# Patient Record
Sex: FEMALE | Race: WHITE | NOT HISPANIC OR LATINO | Employment: OTHER | URBAN - NONMETROPOLITAN AREA
[De-identification: names, ages, dates, MRNs, and addresses within clinical notes are randomized per-mention and may not be internally consistent; named-entity substitution may affect disease eponyms.]

---

## 2016-06-14 LAB — HBA1C MFR BLD: 6.3 % (ref 4–6)

## 2017-01-07 DIAGNOSIS — F34.1 DYSTHYMIA: Primary | ICD-10-CM

## 2017-01-10 RX ORDER — VENLAFAXINE HYDROCHLORIDE 75 MG/1
CAPSULE, EXTENDED RELEASE ORAL
Qty: 180 CAPSULE | Refills: 0 | Status: SHIPPED | OUTPATIENT
Start: 2017-01-10 | End: 2017-04-06

## 2017-01-30 ENCOUNTER — HOSPITAL ENCOUNTER (EMERGENCY)
Facility: HOSPITAL | Age: 57
Discharge: HOME | End: 2017-01-30
Attending: NURSE PRACTITIONER | Admitting: NURSE PRACTITIONER
Payer: COMMERCIAL

## 2017-01-30 VITALS
HEART RATE: 66 BPM | TEMPERATURE: 98.1 F | SYSTOLIC BLOOD PRESSURE: 142 MMHG | RESPIRATION RATE: 18 BRPM | OXYGEN SATURATION: 99 % | DIASTOLIC BLOOD PRESSURE: 77 MMHG

## 2017-01-30 DIAGNOSIS — J04.0 LARYNGITIS: ICD-10-CM

## 2017-01-30 DIAGNOSIS — R09.82 POST-NASAL DRIP: ICD-10-CM

## 2017-01-30 PROCEDURE — 99212 OFFICE O/P EST SF 10 MIN: CPT

## 2017-01-30 PROCEDURE — 99212 OFFICE O/P EST SF 10 MIN: CPT | Performed by: NURSE PRACTITIONER

## 2017-01-30 ASSESSMENT — ENCOUNTER SYMPTOMS
ARTHRALGIAS: 0
SINUS PRESSURE: 1
VOICE CHANGE: 1
VOMITING: 0
CHILLS: 0
NAUSEA: 0
COUGH: 1
FEVER: 0
SHORTNESS OF BREATH: 0
APPETITE CHANGE: 0
MYALGIAS: 0
FATIGUE: 0

## 2017-01-30 NOTE — LETTER
[facility]  [address]  [place] [address]  [phone]    2017    Suzy Gallardo  [address] [facility] [address]  [phone] (home)     : 1960      To Whom it may concern:    Suzy Gallardo was seen in our Urgent Care Department today, 2017.  I expect her condition to improve over the next 1-2 days.  She may return to work/school when improved.    Sincerely,        Luzma Escobar

## 2017-01-30 NOTE — ED NOTES
Pt presents with laryngitis x 1 week. States she was seen Thursday in UC in [facility] and was prescribed amoxicillin, ibuprofen and Claritin. Pt states she is not getting any better.

## 2017-01-30 NOTE — DISCHARGE INSTRUCTIONS
Mucinex   Netti pot    Laryngitis    Laryngitis is a swelling of the tissues around the vocal cords. Symptoms include a hoarse (scratchy) voice. The voice may be lost completely. It may be caused by a viral illness, such as a head or chest cold. It may also be due to overuse and strain of the voice. Smoking, drinking alcohol, acid reflux, allergies, or inhaling harsh chemicals may also lead to symptoms. This condition will usually resolve in 1-2 weeks.  Home care    Rest your voice until it recovers. Talk as little as possible. If your symptoms are severe, rest at home for a day or so.    Breathing cool steam from a humidifier/vaporizer or in a steamy shower may be helpful.    Drink plenty of fluids to stay well hydrated.    Do not smoke  Follow-up care  Follow up with your healthcare provider or this facility if you have not improved after one week.  When to seek medical advice  Contact your healthcare provider for any of the following:    Severe pain with swallowing    Trouble opening mouth    Neck swelling, neck pain, or trouble moving neck    Noisy breathing or trouble breathing    Fever of 100.4 F (38. C) or higher, or as directed by your healthcare provider    Drooling    Symptoms do not resolve in 2 weeks    9924-0450 [employer]). [address] [place] [address], [place] [address]. All rights reserved. This information is not intended as a substitute for professional medical care. Always follow your healthcare professional's instructions.

## 2017-01-30 NOTE — ED PROVIDER NOTES
History     Chief Complaint   Patient presents with     Laryngitis     one week hx of laryngitis     The history is provided by the patient. No  was used.     Suzy Gallardo is a 56 year old female who presents today with a CC of 1 week of laryngitis.  She was seen in  in [facility] on 1/26/17 for the same symptoms and started on Amoxicillin, claritin, and ibuprofen.  She has been doing voice rest, hot tea, warm salt water gargles,  but has continued laryngitis.  She does have a cough and post nasal drip.  Reports she feels like there is thick secretions coating the back of her throat.   She is a  and is unable to teach due to laryngitis.      I have reviewed the Medications, Allergies, Past Medical and Surgical History, and Social History in the Epic system.    Review of Systems   Constitutional: Negative for fever, chills, appetite change and fatigue.   HENT: Positive for ear pain (intermittent pressure), sinus pressure and voice change.    Respiratory: Positive for cough. Negative for shortness of breath.    Gastrointestinal: Negative for nausea and vomiting.   Musculoskeletal: Negative for myalgias and arthralgias.       Physical Exam   BP: 142/77 mmHg  Pulse: 66  Temp: 98.1  F (36.7  C)  Resp: 18  SpO2: 99 %    Physical Exam   Constitutional: She is oriented to person, place, and time. She appears well-developed and well-nourished.   HENT:   Head: Normocephalic and atraumatic.   Right Ear: Tympanic membrane, external ear and ear canal normal.   Left Ear: Tympanic membrane, external ear and ear canal normal.   Nose: Mucosal edema present. Right sinus exhibits no maxillary sinus tenderness and no frontal sinus tenderness. Left sinus exhibits no maxillary sinus tenderness and no frontal sinus tenderness.   Mouth/Throat: Uvula is midline and oropharynx is clear and moist.   Neck: Normal range of motion. Neck supple.   Cardiovascular: Normal rate and regular rhythm.     Pulmonary/Chest: Effort normal and breath sounds normal.   Neurological: She is alert and oriented to person, place, and time.   Psychiatric: She has a normal mood and affect. Her behavior is normal.   Nursing note and vitals reviewed.      ED Course   Procedures    Assessments & Plan (with Medical Decision Making)     I have reviewed the nursing notes.    I have reviewed the findings, diagnosis, plan and need for follow up with the patient.  ASSESSMENT / PLAN:  (J04.0) Laryngitis  Comment:  Plan:  Mucinex to thin secretions   Netti pot   Flonase    Continue warm salt water gargles   Continue voice rest   Follow up with Dr Mena on Wednesday   Patient verbally educated and given appropriate education sheets for their diagnoses and has no questions.   Return to ED/UC if symptoms increase or concerns develop: red flag symptoms as discussed and per discharge instructions     (R09.82) Post-nasal drip  Comment:   Plan:  As above        Discharge Medication List as of 1/30/2017 11:28 AM          Final diagnoses:   Laryngitis   Post-nasal drip       1/30/2017   [facility]      Luzma Escobar NP  01/30/17 [id number]

## 2017-01-30 NOTE — LETTER
[facility]  [address]  [place] [address]  [phone]    2017    Suzy Gallardo  [address] [facility] [address]  [phone] (home)     : 1960      To Whom it may concern:    Suzy Gallardo was seen in our Urgent Care Department today, 2017.  I expect her condition to improve over the next 1-3 days.  She may return to work/school when improved.    Sincerely,        Luzma Escobar

## 2017-01-30 NOTE — ED AVS SNAPSHOT
[facility]    [address] [address]    Phone:  [phone]                                       Suzy Gallardo   MRN: [id number]    Department:  [facility]   Date of Visit:  1/30/2017           Patient Information     Date Of Birth          1960        Your diagnoses for this visit were:     Sinusitis, unspecified chronicity, unspecified location     Laryngitis        You were seen by Luzma Escobar NP.      Follow-up Information     Follow up with [facility].    Specialty:  EMERGENCY MEDICINE    Why:  As needed, If symptoms worsen, or concerns develop    Contact information:    [address] [address] [phone]    Additional information:    From [facility]: Take [address]. Turn left at [address]. Turn left at the first stoplight on [address]. At the first stop sign, take a right onto [address]. Take a left into the parking lot and continue through until you reach the North enterance of the building.       From [place]: Take [address]. Take the [address] ramp towards [place]. Turn left onto [address]. Take a slight right onto [address] [facility]. Turn left at the first stoplight on [address]. At the first stop sign, take a right onto [address]. Take a left into the parking lot and continue through until you reach the North enterance of the building.       From [place]: Take [address]. Take a right at [address]. At the first stop sign, take a right onto [address]. Take a left into the parking lot and continue through until you reach the North enterance of the building.         Follow up with Nathanael Mena MD.    Specialty:  Family Practice    Contact information:    [facility]  [address]  [facility] [address]  [phone]          Discharge Instructions         Mucinex   Netti  pot    Laryngitis    Laryngitis is a swelling of the tissues around the vocal cords. Symptoms include a hoarse (scratchy) voice. The voice may be lost completely. It may be caused by a viral illness, such as a head or chest cold. It may also be due to overuse and strain of the voice. Smoking, drinking alcohol, acid reflux, allergies, or inhaling harsh chemicals may also lead to symptoms. This condition will usually resolve in 1-2 weeks.  Home care    Rest your voice until it recovers. Talk as little as possible. If your symptoms are severe, rest at home for a day or so.    Breathing cool steam from a humidifier/vaporizer or in a steamy shower may be helpful.    Drink plenty of fluids to stay well hydrated.    Do not smoke  Follow-up care  Follow up with your healthcare provider or this facility if you have not improved after one week.  When to seek medical advice  Contact your healthcare provider for any of the following:    Severe pain with swallowing    Trouble opening mouth    Neck swelling, neck pain, or trouble moving neck    Noisy breathing or trouble breathing    Fever of 100.4 F (38. C) or higher, or as directed by your healthcare provider    Drooling    Symptoms do not resolve in 2 weeks    5437-6675 [employer]. [address] [place] [address]. All rights reserved. This information is not intended as a substitute for professional medical care. Always follow your healthcare professional's instructions.          Future Appointments        Provider Department Dept Phone Center    3/7/2017 8:30 AM Lali Monroe MD, MD [facility] [phone] [facility]    3/7/2017 10:00 AM  M[facility] [place] [phone] [place]         Review of your medicines      Our records show that you are taking the medicines listed below. If these are incorrect, please call your family doctor or clinic.        Dose / Directions Last dose taken     "amoxicillin-clavulanate 875-125 MG per tablet   Commonly known as:  AUGMENTIN   Dose:  1 tablet   Quantity:  20 tablet        Take 1 tablet by mouth 2 times daily   Refills:  0        ASPIRIN PO   Dose:  81 mg        Take 81 mg by mouth daily   Refills:  0        * BD U/F III SHORT PEN NEEDLE        See instructions.   Refills:  0        * pen needles 5/16\" 31G X 8 MM Misc        Refills:  0        calcium-vitamin D 600-400 MG-UNIT per tablet   Commonly known as:  CALTRATE   Dose:  1 tablet        Take 1 tablet by mouth daily.   Refills:  0        CLARITIN PO        Refills:  0        furosemide 40 MG tablet   Commonly known as:  LASIX   Dose:  40 mg   Quantity:  180 tablet        Take 1 tablet (40 mg) by mouth 2 times daily   Refills:  2        GLUCOPHAGE PO   Dose:  1000 mg        Take 1,000 mg by mouth 2 times daily (with meals).   Refills:  0        IBUPROFEN PO        Refills:  0        LANTUS SOLOSTAR 100 UNIT/ML injection   Dose:  30 Units   Generic drug:  insulin glargine        Inject 30 Units Subcutaneous At Bedtime   Refills:  0        liraglutide 18 MG/3ML Soln   Commonly known as:  VICTOZA   Dose:  1.8 mg        Inject 1.8 mg Subcutaneous daily   Refills:  0        multivitamin, therapeutic with minerals Tabs tablet   Dose:  1 tablet        Take 1 tablet by mouth daily.   Refills:  0        ONE TOUCH ULTRA        1 each strip TID AC   Refills:  0        SIMVASTATIN PO   Dose:  40 mg        Take 40 mg by mouth daily.   Refills:  0        venlafaxine 75 MG 24 hr capsule   Commonly known as:  EFFEXOR-XR   Quantity:  180 capsule        TAKE TWO CAPSULES BY MOUTH DAILY   Refills:  0        * Notice:  This list has 2 medication(s) that are the same as other medications prescribed for you. Read the directions carefully, and ask your doctor or other care provider to review them with you.            Orders Needing Specimen Collection     None      Pending Results     No orders found from 1/29/2017 to 1/31/2017. "            Pending Culture Results     No orders found from 1/29/2017 to 1/31/2017.            Thank you for choosing [facility]       Thank you for choosing [facility] for your care. Our goal is always to provide you with excellent care. Hearing back from our patients is one way we can continue to improve our services. Please take a few minutes to complete the written survey that you may receive in the mail after you visit with us. Thank you!        [facility] Information     [facility] gives you secure access to your electronic health record. If you see a primary care provider, you can also send messages to your care team and make appointments. If you have questions, please call your primary care clinic.  If you do not have a primary care provider, please call [phone] and they will assist you.        Care EveryWhere ID     This is your Care EveryWhere ID. This could be used by other organizations to access your [facility] medical records  [id number]        After Visit Summary       This is your record. Keep this with you and show to your community pharmacist(s) and doctor(s) at your next visit.

## 2017-01-30 NOTE — ED AVS SNAPSHOT
[facility]    [address] [address]    Phone:  [phone]                                       Suzy Gallardo   MRN: [id number]    Department:  [facility]   Date of Visit:  1/30/2017           After Visit Summary Signature Page     I have received my discharge instructions, and my questions have been answered. I have discussed any challenges I see with this plan with the nurse or doctor.    ..........................................................................................................................................  Patient/Patient Representative Signature      ..........................................................................................................................................  Patient Representative Print Name and Relationship to Patient    ..................................................               ................................................  Date                                            Time    ..........................................................................................................................................  Reviewed by Signature/Title    ...................................................              ..............................................  Date                                                            Time

## 2017-02-01 ENCOUNTER — OFFICE VISIT (OUTPATIENT)
Dept: FAMILY MEDICINE | Facility: OTHER | Age: 57
End: 2017-02-01
Attending: FAMILY MEDICINE
Payer: COMMERCIAL

## 2017-02-01 VITALS
HEIGHT: 62 IN | DIASTOLIC BLOOD PRESSURE: 76 MMHG | OXYGEN SATURATION: 99 % | SYSTOLIC BLOOD PRESSURE: 110 MMHG | WEIGHT: 173 LBS | RESPIRATION RATE: 15 BRPM | TEMPERATURE: 97.9 F | BODY MASS INDEX: 31.83 KG/M2 | HEART RATE: 66 BPM

## 2017-02-01 DIAGNOSIS — J04.0 LARYNGITIS: Primary | ICD-10-CM

## 2017-02-01 PROCEDURE — 99213 OFFICE O/P EST LOW 20 MIN: CPT | Performed by: FAMILY MEDICINE

## 2017-02-01 RX ORDER — TRIAMCINOLONE ACETONIDE 55 UG/1
2 SPRAY, METERED NASAL DAILY
Status: ON HOLD | COMMUNITY
End: 2017-08-18

## 2017-02-01 NOTE — NURSING NOTE
"Chief Complaint   Patient presents with     Hospital F/U      1/30/2017       Initial /76 mmHg  Pulse 66  Temp(Src) 97.9  F (36.6  C) (Tympanic)  Resp 15  Ht 5' 2\" (1.575 m)  Wt 173 lb (78.472 kg)  BMI 31.63 kg/m2  SpO2 99% Estimated body mass index is 31.63 kg/(m^2) as calculated from the following:    Height as of this encounter: 5' 2\" (1.575 m).    Weight as of this encounter: 173 lb (78.472 kg).  BP completed using cuff size: xavier Deutsch      "

## 2017-02-01 NOTE — PROGRESS NOTES
Suzy Gallardo    February 1, 2017    Chief Complaint   Patient presents with     Hospital F/U      1/30/2017       SUBJECTIVE:  Here for ongoing laryngitis.   Feels fine except the voice.  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  Finished amox.  No other issues or concerns.  Gets dm cares with Dr. Craft.      Past Medical History   Diagnosis Date     Diabetes mellitus (H)      Diabetes mellitus without mention of complication, 11/30/2001     Pure hypercholesterolemia 11/26/1999     Cristian Bradley MD      Pure hyperglyceridemia 01/11/2000     Cristian Bradley MD      Anemia, unspecified 06/23/2004     Lali Monroe MD      Depressive disorder, not elsewhere classified 01/19/2004     Vaginitis and vulvovaginitis, unspecified 11/21/2001     Endometriosis of uterus 12/19/2001     Unspecified symptom associated with female genital organs 10/30/2001     Metrorrhagia 07/08/2004     Cellulitis and abscess of hand, except fingers and thumb 01/11/2002     Cellulitis and abscess of unspecified site 02/22/2002     Jaime Rodarte MD      Ingrowing nail 03/21/2000     Other (abnormal) findings on radiological examination of breast 08/15/2002     Alfonso Patton MD      Need for prophylactic vaccination and inoculation against influenza 11/06/2003     Sterilization 12/04/2001     Follow-up examination, following unspecified surgery 10/02/2001     Routine general medical examination at a health care facility 04/07/2003     Other screening mammogram 07/25/2000     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     Nathanael Mena MD        Past Surgical History   Procedure Laterality Date     Hysterectomy       [place] teeth removed       Breast surgery       breast biopsy     Laparoscopy       Colonoscopy  5/3/2013     Procedure: COLONOSCOPY;  COLONOSCOPY;  Surgeon: Pablito Urbina MD;  Location: [facility]     Nose surgery       Endometrial sampling (biopsy)         Current Outpatient Prescriptions   Medication Sig  "Dispense Refill     triamcinolone (NASACORT ALLERGY 24HR) 55 MCG/ACT Inhaler Spray 2 sprays into both nostrils daily       dextromethorphan-guaiFENesin (MUCINEX DM)  MG per 12 hr tablet Take 1 tablet by mouth every 12 hours       Loratadine (CLARITIN PO)        IBUPROFEN PO        venlafaxine (EFFEXOR-XR) 75 MG 24 hr capsule TAKE TWO CAPSULES BY MOUTH DAILY 180 capsule 0     furosemide (LASIX) 40 MG tablet Take 1 tablet (40 mg) by mouth 2 times daily 180 tablet 2     amoxicillin-clavulanate (AUGMENTIN) 875-125 MG per tablet Take 1 tablet by mouth 2 times daily 20 tablet 0     ASPIRIN PO Take 81 mg by mouth daily        multivitamin, therapeutic with minerals (THERA-VIT-M) TABS Take 1 tablet by mouth daily.       calcium-vitamin D (CALTRATE) 600-400 MG-UNIT per tablet Take 1 tablet by mouth daily.       Blood Glucose Monitoring Suppl (ONE TOUCH ULTRA) 1 each strip TID AC       Insulin Pen Needle (BD U/F III SHORT PEN NEEDLE) See instructions.       MetFORMIN HCl (GLUCOPHAGE PO) Take 1,000 mg by mouth 2 times daily (with meals).       insulin glargine (LANTUS SOLOSTAR) 100 UNIT/ML injection Inject 30 Units Subcutaneous At Bedtime        Insulin Pen Needle (PEN NEEDLES 5/16\") 31G X 8 MM MISC        liraglutide (VICTOZA) 18 MG/3ML SOLN Inject 1.8 mg Subcutaneous daily        SIMVASTATIN PO Take 40 mg by mouth daily.         No Known Allergies    Family History   Problem Relation Age of Onset     C.A.D. 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     OBJECTIVE:  B/P: 110/76, T: 97.9, P: 66, R: 15    GENERAL APPEARANCE: Alert, no acute distress  HEENT:  Normal.    CV: regular rate and rhythm, no murmur, rub or gallop  RESP: lungs clear to auscultation bilaterally  ABDOMEN: normal bowel sounds, soft, nontender, no hepatosplenomegaly or other masses  SKIN: no suspicious lesions or rashes to visualized skin  NEURO: Alert, oriented x 3, speech and mentation normal    ASSESSMENT and PLAN:  (J04.0) Laryngitis  (primary encounter diagnosis)  Comment: reviewed.    Plan: note for work, which is really what she needed.  Advised icy foods, voice rest, and f/u with ongoing concerns.

## 2017-02-10 ENCOUNTER — TRANSFERRED RECORDS (OUTPATIENT)
Dept: HEALTH INFORMATION MANAGEMENT | Facility: HOSPITAL | Age: 57
End: 2017-02-10

## 2017-02-10 LAB — HBA1C MFR BLD: 7.9 % (ref 4–6)

## 2017-03-07 ENCOUNTER — OFFICE VISIT (OUTPATIENT)
Dept: OBGYN | Facility: OTHER | Age: 57
End: 2017-03-07
Attending: OBSTETRICS & GYNECOLOGY
Payer: COMMERCIAL

## 2017-03-07 VITALS
BODY MASS INDEX: 34.78 KG/M2 | HEIGHT: 62 IN | SYSTOLIC BLOOD PRESSURE: 120 MMHG | HEART RATE: 64 BPM | WEIGHT: 189 LBS | DIASTOLIC BLOOD PRESSURE: 72 MMHG

## 2017-03-07 DIAGNOSIS — Z12.31 ENCOUNTER FOR SCREENING MAMMOGRAM FOR BREAST CANCER: ICD-10-CM

## 2017-03-07 DIAGNOSIS — Z12.31 VISIT FOR SCREENING MAMMOGRAM: Primary | ICD-10-CM

## 2017-03-07 DIAGNOSIS — Z00.00 ROUTINE GENERAL MEDICAL EXAMINATION AT A HEALTH CARE FACILITY: Primary | ICD-10-CM

## 2017-03-07 DIAGNOSIS — R92.8 ABNORMAL MAMMOGRAM: Primary | ICD-10-CM

## 2017-03-07 PROCEDURE — 77063 BREAST TOMOSYNTHESIS BI: CPT | Mod: TC | Performed by: RADIOLOGY

## 2017-03-07 PROCEDURE — G0202 SCR MAMMO BI INCL CAD: HCPCS | Mod: TC | Performed by: RADIOLOGY

## 2017-03-07 PROCEDURE — 99396 PREV VISIT EST AGE 40-64: CPT | Performed by: OBSTETRICS & GYNECOLOGY

## 2017-03-07 ASSESSMENT — ANXIETY QUESTIONNAIRES
2. NOT BEING ABLE TO STOP OR CONTROL WORRYING: NOT AT ALL
3. WORRYING TOO MUCH ABOUT DIFFERENT THINGS: NOT AT ALL
IF YOU CHECKED OFF ANY PROBLEMS ON THIS QUESTIONNAIRE, HOW DIFFICULT HAVE THESE PROBLEMS MADE IT FOR YOU TO DO YOUR WORK, TAKE CARE OF THINGS AT HOME, OR GET ALONG WITH OTHER PEOPLE: NOT DIFFICULT AT ALL
6. BECOMING EASILY ANNOYED OR IRRITABLE: SEVERAL DAYS
1. FEELING NERVOUS, ANXIOUS, OR ON EDGE: NOT AT ALL
5. BEING SO RESTLESS THAT IT IS HARD TO SIT STILL: NOT AT ALL
7. FEELING AFRAID AS IF SOMETHING AWFUL MIGHT HAPPEN: NOT AT ALL
GAD7 TOTAL SCORE: 2

## 2017-03-07 ASSESSMENT — PATIENT HEALTH QUESTIONNAIRE - PHQ9: 5. POOR APPETITE OR OVEREATING: SEVERAL DAYS

## 2017-03-07 NOTE — MR AVS SNAPSHOT
After Visit Summary   3/7/2017    Suzy Gallardo    MRN: [id number]           Patient Information     Date Of Birth          1960        Visit Information        Provider Department      3/7/2017 8:30 AM Lali Monroe MD [facility]        Today's Diagnoses     Routine general medical examination at a health care facility    -  1    Encounter for screening mammogram for breast cancer          Care Instructions    Mammogram to be scheduled. You will be contacted by hospital diagnostic imaging to make this appointment. Please call the nurse at [phone] if you have not been contacted in a timely manner to schedule.    The hemoccult cards are a screening test for blood in the stool. Disregard the instructions in your hemoccult envelope about altering your diet or medications for the test. Take your normal medications and eat your normal diet. This way we may know if you are having a problem with your diet or medications. To collect your sample, simply wipe your toilet paper on the 2 squares under the flap after you have had a bowel movement. Wait at least one day for your next bowel movement to do the next card. There are 3 cards total. Return all 3 cards labeled to come to Dr. Monroe in the clinic when they are all completed. You may mail them or drop them off at the . Make sure that your name and date of birth are on the cards. Call the nurse at [phone] if you have any questions.    Return for annual exam in 1 year    Colonoscopy due in 2020            Follow-ups after your visit        Your next 10 appointments already scheduled     Mar 07, 2017 10:00 AM CST   MAMMOGRAM with [facility] MAMMOGRAM [facility]    [address] [facility] [address]   [phone]           Do not wear any body powder, lotions, deodorant or perfume the day of the exam. Bring a list of all medications, especially hormones.  If your last mammogram was not  "done at [facility] [place], please bring your mammogram films. We will need the name of your MD/PA to send a copy of your report.              Who to contact     If you have questions or need follow up information about today's clinic visit or your schedule please contact [facility] directly at [phone].  Normal or non-critical lab and imaging results will be communicated to you by MyChart, letter or phone within 4 business days after the clinic has received the results. If you do not hear from us within 7 days, please contact the clinic through [employer] or phone. If you have a critical or abnormal lab result, we will notify you by phone as soon as possible.  Submit refill requests through [employer]use or call your pharmacy and they will forward the refill request to us. Please allow 3 business days for your refill to be completed.          Additional Information About Your Visit        MyChart Information     [employer] gives you secure access to your electronic health record. If you see a primary care provider, you can also send messages to your care team and make appointments. If you have questions, please call your primary care clinic.  If you do not have a primary care provider, please call [phone] and they will assist you.        Care EveryWhere ID     This is your Care EveryWhere ID. This could be used by other organizations to access your [facility] medical records  [id number]        Your Vitals Were     Pulse Height BMI (Body Mass Index)             64 5' 2\" (1.575 m) 34.57 kg/m2          Blood Pressure from Last 3 Encounters:   03/07/17 120/72   02/01/17 110/76   01/30/17 142/77    Weight from Last 3 Encounters:   03/07/17 189 lb (85.7 kg)   02/01/17 173 lb (78.5 kg)   08/03/16 173 lb 12.8 oz (78.8 kg)              We Performed the Following     MA Screening Digital Bilateral        Primary Care Provider Office Phone # Fax #    Nathanael Mena [phone]       [facility] " "[address]  [facility] [address]        Thank you!     [facility] you for choosing [facility]  for your care. Our goal is always to provide you with excellent care. Hearing back from our patients is one way we can continue to improve our services. Please take a few minutes to complete the written survey that you may receive in the mail after your visit with us. Thank you!             Your Updated Medication List - Protect others around you: Learn how to safely use, store and throw away your medicines at www.disposemymeds.org.          This list is accurate as of: 3/7/17  9:23 AM.  Always use your most recent med list.                   Brand Name Dispense Instructions for use    ASPIRIN PO      Take 81 mg by mouth daily       * BD U/F III SHORT PEN NEEDLE      See instructions.       * pen needles 5/16\" 31G X 8 MM Misc          calcium-vitamin D 600-400 MG-UNIT per tablet    CALTRATE     Take 1 tablet by mouth daily.       CLARITIN PO          dextromethorphan-guaiFENesin  MG per 12 hr tablet    MUCINEX DM     Take 1 tablet by mouth every 12 hours       furosemide 40 MG tablet    LASIX    180 tablet    Take 1 tablet (40 mg) by mouth 2 times daily       GLUCOPHAGE PO      Take 1,000 mg by mouth 2 times daily (with meals).       IBUPROFEN PO          LANTUS SOLOSTAR 100 UNIT/ML injection   Generic drug:  insulin glargine      Inject 30 Units Subcutaneous At Bedtime       liraglutide 18 MG/3ML Soln    VICTOZA     Inject 1.8 mg Subcutaneous daily       multivitamin, therapeutic with minerals Tabs tablet      Take 1 tablet by mouth daily.       NASACORT ALLERGY 24HR 55 MCG/ACT Inhaler   Generic drug:  triamcinolone      Spray 2 sprays into both nostrils daily       ONE TOUCH ULTRA      1 each strip TID AC       SIMVASTATIN PO      Take 40 mg by mouth daily.       venlafaxine 75 MG 24 hr capsule    EFFEXOR-XR    180 capsule    TAKE TWO CAPSULES BY MOUTH DAILY       * Notice:  This list has 2 medication(s) " that are the same as other medications prescribed for you. Read the directions carefully, and ask your doctor or other care provider to review them with you.

## 2017-03-07 NOTE — NURSING NOTE
"Chief Complaint   Patient presents with     Gyn Exam       Initial /72  Pulse 64  Ht 5' 2\" (1.575 m)  Wt 189 lb (85.7 kg)  BMI 34.57 kg/m2 Estimated body mass index is 34.57 kg/(m^2) as calculated from the following:    Height as of this encounter: 5' 2\" (1.575 m).    Weight as of this encounter: 189 lb (85.7 kg).  Medication Reconciliation: austen Jang      "

## 2017-03-07 NOTE — PATIENT INSTRUCTIONS
Mammogram to be scheduled. You will be contacted by [facility] diagnostic imaging to make this appointment. Please call the nurse at [phone] if you have not been contacted in a timely manner to schedule.    The hemoccult cards are a screening test for blood in the stool. Disregard the instructions in your hemoccult envelope about altering your diet or medications for the test. Take your normal medications and eat your normal diet. This way we may know if you are having a problem with your diet or medications. To collect your sample, simply wipe your toilet paper on the 2 squares under the flap after you have had a bowel movement. Wait at least one day for your next bowel movement to do the next card. There are 3 cards total. Return all 3 cards labeled to come to Dr. Monroe in the clinic when they are all completed. You may mail them or drop them off at the . Make sure that your name and date of birth are on the cards. Call the nurse at [phone] if you have any questions.    Return for annual exam in 1 year    Colonoscopy due in 2020

## 2017-03-07 NOTE — PROGRESS NOTES
ANNUAL    Suzy is a 56 year old   who presents for annual exam.   Postmenopausal since .  She is having hot flashes. No vaginal bleeding noted.     Concerns other than routine annual and health maintenance:  n.  GYNECOLOGIC HISTORY:    She is sexually active  Problems with sex: n  Safe: y   Wanting std testing? n  Estrogen replacement therapy:  n  History of abnormal Pap smear: n  Family history of breast ca n, ovarian ca n, uterine n, colon CA:  n       HEALTH MAINTENANCE:  Cholesterol: (No components found for: CHOL2 ) History of abnormal lipids: BT   Last Mammo: needs . History of abnormal Mammo: y   Regular Self Breast Exams: y  Last Colonoscopy:  History of abnormal Colonoscopy n due in   Calcium or vitamins; n   Exercise: n     TSH: (No components found for: TSH1 )  Pap; (No results found for: PAP )    HISTORY:  Obstetric History       T2      TAB0   SAB1   E0   M0   L2       # Outcome Date GA Lbr Apolinar/2nd Weight Sex Delivery Anes PTL Lv   3 SAB  6w0d    SAB      2 Term     F Vag-Spont   Y   1 Term     M Vag-Spont   Y        Past Medical History   Diagnosis Date     Anemia, unspecified 2004     Lali Monroe MD      Cellulitis and abscess of hand, except fingers and thumb 2002     Cellulitis and abscess of unspecified site 2002     Jaime Rodarte MD      Depressive disorder, not elsewhere classified 2004     Diabetes mellitus (H)      Diabetes mellitus without mention of complication, 2001     Endometriosis of uterus 2001     Follow-up examination, following unspecified surgery 10/02/2001     Ingrowing nail 2000     Metrorrhagia 2004     [place] disease  2012     Nathanael Mena MD      Need for prophylactic vaccination and inoculation against influenza 2003     Other (abnormal) findings on radiological examination of breast 08/15/2002     Alfonso Patton MD      Other screening mammogram 2000     Pure  hypercholesterolemia 11/26/1999     Cristian Bradley MD      Pure hyperglyceridemia 01/11/2000     Cristian Bradley MD      Routine general medical examination at a health care facility 04/07/2003     Sterilization 12/04/2001     Unspecified symptom associated with female genital organs 10/30/2001     Vaginitis and vulvovaginitis, unspecified 11/21/2001     Past Surgical History   Procedure Laterality Date     Hysterectomy       [place] teeth removed       Breast surgery       breast biopsy     Laparoscopy       Colonoscopy  5/3/2013     Procedure: COLONOSCOPY;  COLONOSCOPY;  Surgeon: Pablito Urbina MD;  Location: [facility]     Nose surgery       Endometrial sampling (biopsy)       Family History   Problem Relation Age of Onset     C.A.D. 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[place]     Social History Main Topics     Smoking status: Never Smoker     Smokeless tobacco: Never Used     Alcohol use No     Drug use: No     Sexual activity: Not Asked     Other Topics Concern     Blood Transfusions Yes     Caffeine Concern No     Social History Narrative       Current Outpatient Prescriptions:      triamcinolone (NASACORT ALLERGY 24HR) 55 MCG/ACT Inhaler, Spray 2 sprays into both nostrils daily, Disp: , Rfl:      dextromethorphan-guaiFENesin (MUCINEX DM)  MG per 12 hr tablet, Take 1 tablet by mouth every 12 hours, Disp: , Rfl:      Loratadine (CLARITIN PO), , Disp: , Rfl:      IBUPROFEN PO, , Disp: , Rfl:      venlafaxine (EFFEXOR-XR) 75 MG 24 hr capsule, TAKE TWO CAPSULES BY MOUTH DAILY, Disp: 180 capsule, Rfl: 0     furosemide (LASIX) 40 MG tablet, Take 1 tablet (40 mg) by mouth 2 times daily, Disp: 180 tablet, Rfl: 2     ASPIRIN PO, Take 81 mg by mouth daily , Disp: , Rfl:       "multivitamin, therapeutic with minerals (THERA-VIT-M) TABS, Take 1 tablet by mouth daily., Disp: , Rfl:      calcium-vitamin D (CALTRATE) 600-400 MG-UNIT per tablet, Take 1 tablet by mouth daily., Disp: , Rfl:      Blood Glucose Monitoring Suppl (ONE TOUCH ULTRA), 1 each strip TID AC, Disp: , Rfl:      Insulin Pen Needle (BD U/F III SHORT PEN NEEDLE), See instructions., Disp: , Rfl:      MetFORMIN HCl (GLUCOPHAGE PO), Take 1,000 mg by mouth 2 times daily (with meals)., Disp: , Rfl:      insulin glargine (LANTUS SOLOSTAR) 100 UNIT/ML injection, Inject 30 Units Subcutaneous At Bedtime , Disp: , Rfl:      Insulin Pen Needle (PEN NEEDLES 5/16\") 31G X 8 MM MISC, , Disp: , Rfl:      liraglutide (VICTOZA) 18 MG/3ML SOLN, Inject 1.8 mg Subcutaneous daily , Disp: , Rfl:      SIMVASTATIN PO, Take 40 mg by mouth daily., Disp: , Rfl:    No Known Allergies    Past medical, surgical, social and family history were reviewed and updated in EPIC.     ROS:   C:       NEGATIVE for fever, chills, change in weight   I:         NEGATIVE for worrisome rashes, moles or lesions  E:       NEGATIVE for vision changes or irritation  E/M:   NEGATIVE for ear, mouth and throat problems  R:       NEGATIVE for significant cough or SOB  CV:     NEGATIVE for chest pain, palpitations or peripheral edema  GI:      NEGATIVE for nausea, abdominal pain, heartburn, or change in bowel habits  :    NEGATIVE for frequency, dysuria, hematuria, vaginal discharge, or bleeding, incontinence   M:       NEGATIVE for significant arthralgias or myalgia  N:       NEGATIVE for weakness, dizziness or paresthesias  E:       NEGATIVE for temperature intolerance, skin/hair changes  P:       NEGATIVE for changes in mood or affect     EXAM:  /72  Pulse 64  Ht 5' 2\" (1.575 m)  Wt 189 lb (85.7 kg)  BMI 34.57 kg/m2   BMI: Body mass index is 34.57 kg/(m^2).  Constitutional: healthy, alert and no distress  Head: Normocephalic. No masses, lesions, tenderness or " abnormalities  Neck: Neck supple. Trachea midline. No adenopathy. Thyroid symmetric, normal size.   Cardiovascular: RRR.   Respiratory: lungs clear  Breast: Breasts reveal mild symmetric fibrocystic densities, but there are no dominant, discrete, fixed or suspicious masses found.   Gastrointestinal: Abdomen soft, non-tender, non-distended. No masses, organomegaly  Pelvic:  Vulva:  No external lesions, normal female hair distribution, no inguinal adenopathy.    Urethra:  Midline, non-tender, well supported, no discharge  Vagina:  Atrophic, no abnormal discharge, no lesions  Uterus: absent  Cervix: absent  Ovaries:  No masses appreciated, non-tender, mobile  Rectal Exam: neg for heme or mass    Musculoskeletal: extremities normal  Skin: no suspicious lesions or rashes  Psychiatric: Affect appropriate, cooperative,mentation appears normal.     COUNSELING:     regular exercise y  healthy diet/nutrition y  Immunizations: ?  Folic Acid Counseling  Osteoporosis Prevention/Bone Health   reports that she has never smoked. She has never used smokeless tobacco.  Tobacco Cessation Action Plan: na  Body mass index is 34.57 kg/(m^2).  Weight management plan: Current exercise routine: na. Diet regimen was discussed and plan is lo gy.     FRAX Risk Assessment  ASSESSMENT:  56 year old  with satisfactory annual exam    PLAN  Hemoccults,  Mammogram, Check MIIC  Colonoscopy due in   rto 1 yr      Greater than  0 minutes were spent on issues other than annual          Lali Monroe MD

## 2017-03-07 NOTE — LETTER
[facility] [place]  [address] [facility] [address]  [phone]      May 22, 2017      Suzy Gallardo  [address] [facility] [address]        Dear Suzy,      Thank you for coming to the [facility]. This letter is to inform you that your test results from your recent appointment were normal. Your results are listed below. Please call the nurse at [phone] if you have questions pertaining to these results.      Hemoccult (screen for blood in stool): 3 cards negative for blood in stool        Sincerely,      Lali Monroe MD/Daisy MOTTA LPN

## 2017-03-08 ASSESSMENT — ANXIETY QUESTIONNAIRES: GAD7 TOTAL SCORE: 2

## 2017-03-08 ASSESSMENT — PATIENT HEALTH QUESTIONNAIRE - PHQ9: SUM OF ALL RESPONSES TO PHQ QUESTIONS 1-9: 5

## 2017-03-13 ENCOUNTER — HOSPITAL ENCOUNTER (OUTPATIENT)
Dept: ULTRASOUND IMAGING | Facility: HOSPITAL | Age: 57
Discharge: HOME | End: 2017-03-13
Attending: FAMILY MEDICINE | Admitting: FAMILY MEDICINE
Payer: COMMERCIAL

## 2017-03-13 DIAGNOSIS — R92.8 ABNORMAL ULTRASOUND OF BREAST: ICD-10-CM

## 2017-03-13 DIAGNOSIS — Z12.31 ENCOUNTER FOR SCREENING MAMMOGRAM FOR MALIGNANT NEOPLASM OF BREAST: Primary | ICD-10-CM

## 2017-03-13 PROCEDURE — 76642 ULTRASOUND BREAST LIMITED: CPT | Mod: TC,RT

## 2017-03-20 ENCOUNTER — TELEPHONE (OUTPATIENT)
Dept: OBGYN | Facility: OTHER | Age: 57
End: 2017-03-20

## 2017-03-20 NOTE — TELEPHONE ENCOUNTER
This patient was referral to Dr. Alexander for breast biopsy for abnormal breast ultrasound last week and has not been contacted with an appointment time. She would like an appointment as soon as possible.  Please call Dr. Alexander's office to schedule. She will take the soonest available appointment any day or time. Prefers a Monday if possible, but will make whatever time they have work.    Patients phone number is [phone]

## 2017-04-03 ENCOUNTER — TRANSFERRED RECORDS (OUTPATIENT)
Dept: HEALTH INFORMATION MANAGEMENT | Facility: HOSPITAL | Age: 57
End: 2017-04-03

## 2017-04-06 DIAGNOSIS — F34.1 DYSTHYMIA: ICD-10-CM

## 2017-04-06 RX ORDER — VENLAFAXINE HYDROCHLORIDE 75 MG/1
CAPSULE, EXTENDED RELEASE ORAL
Qty: 180 CAPSULE | Refills: 0 | Status: SHIPPED | OUTPATIENT
Start: 2017-04-06 | End: 2017-09-16

## 2017-04-06 NOTE — TELEPHONE ENCOUNTER
effexor       Last Written Prescription Date: 1/10/17  Last Fill Quantity: 180; # refills: 0  Last Office Visit with [facility], [facility] or  Health prescribing provider:  2/1/17        Last PHQ-9 score on record=   PHQ-9 SCORE 3/7/2017   Total Score -   Total Score 5       Lab Results   Component Value Date    AST 33 07/24/2016     Lab Results   Component Value Date    ALT 29 07/24/2016

## 2017-04-16 ENCOUNTER — APPOINTMENT (OUTPATIENT)
Dept: LAB | Facility: OTHER | Age: 57
End: 2017-04-16
Attending: OBSTETRICS & GYNECOLOGY
Payer: COMMERCIAL

## 2017-04-29 ENCOUNTER — MYC MEDICAL ADVICE (OUTPATIENT)
Dept: OBGYN | Facility: OTHER | Age: 57
End: 2017-04-29

## 2017-05-01 ENCOUNTER — TELEPHONE (OUTPATIENT)
Dept: OBGYN | Facility: OTHER | Age: 57
End: 2017-05-01

## 2017-05-01 NOTE — TELEPHONE ENCOUNTER
"Patient sent the following question via my-chart:    Recently I had my annual mammogram and that was followed up with an ultrasound of my rt. breast due to something that was seen in the mammogram.  The ultrasound confirmed what was seen and the radiologist recommended a biopsy.  I then went to [place] to see Dr. Alexander and he recommended a needle biopsy.  When I went for that, they did another ultrasound and that radiologist said he didn't see anything to biopsy and to follow up in a year.    I am concerned about this since three other medical professionals \"saw something\".  My question is, do I need another opinion?  If not, should I wait an entire year before repeating a mammogram and/or ultrasound?  "

## 2017-05-22 LAB — Lab: NORMAL

## 2017-05-30 ENCOUNTER — TELEPHONE (OUTPATIENT)
Dept: FAMILY MEDICINE | Facility: OTHER | Age: 57
End: 2017-05-30

## 2017-05-30 DIAGNOSIS — Q82.0 HEREDITARY EDEMA OF LEGS: ICD-10-CM

## 2017-05-30 NOTE — TELEPHONE ENCOUNTER
11:15 AM    Reason for Call: Phone Call    Description: CELLULITIS IS COMING ON AND IT GETS OUT OF CONTROL FAST, PATIENT WOULD LIKE TO SPEAK WITH DR. DOYLE    Was an appointment offered for this call? N/A    Preferred method for responding to this message: Telephone Call    If we cannot reach you directly, may we leave a detailed response at the number you provided? Yes    Can this message wait until your PCP/provider returns, if available today? Not applicable, PROVIDER IS IN TODAY    Carla Nobles

## 2017-05-30 NOTE — TELEPHONE ENCOUNTER
Sent augmentin.  See me tomorrow please but start on the augmentin tonight.  I chose that one as it was the last one we were on.  Thanks. Nathanael Mena

## 2017-05-30 NOTE — TELEPHONE ENCOUNTER
I called the pt and notified, both medications are bid dosing. Should she take these together or space them out?

## 2017-05-30 NOTE — TELEPHONE ENCOUNTER
Pt is out of [facility] in [facility]. She has Amoxil and Sulfa with her that you gave to keep on hand for this. Should she start one or both?

## 2017-06-08 ENCOUNTER — MYC REFILL (OUTPATIENT)
Dept: FAMILY MEDICINE | Facility: OTHER | Age: 57
End: 2017-06-08

## 2017-06-08 DIAGNOSIS — Q82.0 HEREDITARY EDEMA OF LEGS: ICD-10-CM

## 2017-06-09 NOTE — TELEPHONE ENCOUNTER
Message from [employer]:  Original authorizing provider: Nathanael Mena MD    Suzy Gossgabrielgeorge would like a refill of the following medications:  amoxicillin-clavulanate (AUGMENTIN) 875-125 MG per tablet [Nathanael Mena MD]    Preferred pharmacy: [place] [id number] [place] [address]    Comment:  Hi. I'd also like to have the prescription refilled for Sulfamethoxazole-TMP, 1 tablet two times daily. Dr. Mena prescribed them for me to keep on hand for cellulitis flare ups that I am prone to getting. I recently used these two prescriptions while I was on vacation and started getting cellulitis in my right leg. This was the first time in a long time that I didn't end up in the hospital from the cellulitis because I was able to take the antibiotics immediately. Thank you.

## 2017-06-12 RX ORDER — SULFAMETHOXAZOLE/TRIMETHOPRIM 800-160 MG
1 TABLET ORAL 2 TIMES DAILY
Qty: 20 TABLET | Refills: 0 | Status: ON HOLD | OUTPATIENT
Start: 2017-06-12 | End: 2017-08-20

## 2017-08-18 ENCOUNTER — HOSPITAL ENCOUNTER (INPATIENT)
Facility: HOSPITAL | Age: 57
LOS: 2 days | Discharge: HOME | End: 2017-08-20
Attending: FAMILY MEDICINE | Admitting: HOSPITALIST
Payer: COMMERCIAL

## 2017-08-18 ENCOUNTER — TRANSFERRED RECORDS (OUTPATIENT)
Dept: HEALTH INFORMATION MANAGEMENT | Facility: HOSPITAL | Age: 57
End: 2017-08-18

## 2017-08-18 DIAGNOSIS — L03.90 CELLULITIS: ICD-10-CM

## 2017-08-18 DIAGNOSIS — Q82.0 HEREDITARY EDEMA OF LEGS: ICD-10-CM

## 2017-08-18 DIAGNOSIS — L03.116 CELLULITIS OF LEFT LOWER EXTREMITY: Primary | ICD-10-CM

## 2017-08-18 LAB
ALBUMIN SERPL-MCNC: 3 G/DL (ref 3.4–5)
ALP SERPL-CCNC: 108 U/L (ref 40–150)
ALT SERPL W P-5'-P-CCNC: 27 U/L (ref 0–50)
ANION GAP SERPL CALCULATED.3IONS-SCNC: 8 MMOL/L (ref 3–14)
AST SERPL W P-5'-P-CCNC: 20 U/L (ref 0–45)
BACTERIA SPEC CULT: NORMAL
BACTERIA SPEC CULT: NORMAL
BASOPHILS # BLD AUTO: 0.1 10E9/L (ref 0–0.2)
BASOPHILS NFR BLD AUTO: 0.6 %
BILIRUB SERPL-MCNC: 0.5 MG/DL (ref 0.2–1.3)
BUN SERPL-MCNC: 10 MG/DL (ref 7–30)
CALCIUM SERPL-MCNC: 8.7 MG/DL (ref 8.5–10.1)
CHLORIDE SERPL-SCNC: 100 MMOL/L (ref 94–109)
CHOLEST SERPL-MCNC: 147 MG/DL
CO2 SERPL-SCNC: 25 MMOL/L (ref 20–32)
CREAT SERPL-MCNC: 0.76 MG/DL (ref 0.52–1.04)
CREAT SERPL-MCNC: 0.82 MG/DL (ref 0.7–1.2)
CRP SERPL-MCNC: 324 MG/L (ref 0–8)
DIFFERENTIAL METHOD BLD: ABNORMAL
EOSINOPHIL # BLD AUTO: 0.1 10E9/L (ref 0–0.7)
EOSINOPHIL NFR BLD AUTO: 0.5 %
ERYTHROCYTE [DISTWIDTH] IN BLOOD BY AUTOMATED COUNT: 14 % (ref 10–15)
GFR SERPL CREATININE-BSD FRML MDRD: 78 ML/MIN/1.7M2
GLUCOSE BLDC GLUCOMTR-MCNC: 133 MG/DL (ref 70–99)
GLUCOSE SERPL-MCNC: 119 MG/DL (ref 60–99)
GLUCOSE SERPL-MCNC: 229 MG/DL (ref 70–99)
HBA1C MFR BLD: 8 % (ref 4–6)
HCT VFR BLD AUTO: 33.7 % (ref 35–47)
HDLC SERPL-MCNC: 25 MG/DL
HGB BLD-MCNC: 11.6 G/DL (ref 11.7–15.7)
IMM GRANULOCYTES # BLD: 0.1 10E9/L (ref 0–0.4)
IMM GRANULOCYTES NFR BLD: 0.6 %
LACTATE SERPL-SCNC: 1.5 MMOL/L (ref 0.4–2)
LDLC SERPL CALC-MCNC: 57 MG/DL
LYMPHOCYTES # BLD AUTO: 2.5 10E9/L (ref 0.8–5.3)
LYMPHOCYTES NFR BLD AUTO: 18.4 %
MCH RBC QN AUTO: 30.1 PG (ref 26.5–33)
MCHC RBC AUTO-ENTMCNC: 34.4 G/DL (ref 31.5–36.5)
MCV RBC AUTO: 88 FL (ref 78–100)
MONOCYTES # BLD AUTO: 0.9 10E9/L (ref 0–1.3)
MONOCYTES NFR BLD AUTO: 6.4 %
NEUTROPHILS # BLD AUTO: 9.9 10E9/L (ref 1.6–8.3)
NEUTROPHILS NFR BLD AUTO: 73.5 %
NRBC # BLD AUTO: 0 10*3/UL
NRBC BLD AUTO-RTO: 0 /100
PLATELET # BLD AUTO: 203 10E9/L (ref 150–450)
POTASSIUM SERPL-SCNC: 3.4 MEQ/L (ref 3.5–5.1)
POTASSIUM SERPL-SCNC: 3.4 MMOL/L (ref 3.4–5.3)
PROT SERPL-MCNC: 7.5 G/DL (ref 6.8–8.8)
RBC # BLD AUTO: 3.85 10E12/L (ref 3.8–5.2)
SODIUM SERPL-SCNC: 133 MMOL/L (ref 133–144)
SPECIMEN SOURCE: NORMAL
TRIGL SERPL-MCNC: 327 MG/DL
TSH SERPL-ACNC: 2.75 MIU/ML (ref 0.35–4.8)
WBC # BLD AUTO: 13.5 10E9/L (ref 4–11)

## 2017-08-18 PROCEDURE — 12000000 ZZH R&B MED SURG/OB

## 2017-08-18 PROCEDURE — 83605 ASSAY OF LACTIC ACID: CPT | Performed by: FAMILY MEDICINE

## 2017-08-18 PROCEDURE — 36415 COLL VENOUS BLD VENIPUNCTURE: CPT | Performed by: FAMILY MEDICINE

## 2017-08-18 PROCEDURE — 25000128 H RX IP 250 OP 636: Performed by: HOSPITALIST

## 2017-08-18 PROCEDURE — 96367 TX/PROPH/DG ADDL SEQ IV INF: CPT

## 2017-08-18 PROCEDURE — 00000146 ZZHCL STATISTIC GLUCOSE BY METER IP

## 2017-08-18 PROCEDURE — 99285 EMERGENCY DEPT VISIT HI MDM: CPT | Performed by: FAMILY MEDICINE

## 2017-08-18 PROCEDURE — 99222 1ST HOSP IP/OBS MODERATE 55: CPT | Performed by: HOSPITALIST

## 2017-08-18 PROCEDURE — 87040 BLOOD CULTURE FOR BACTERIA: CPT | Performed by: FAMILY MEDICINE

## 2017-08-18 PROCEDURE — 86140 C-REACTIVE PROTEIN: CPT | Performed by: HOSPITALIST

## 2017-08-18 PROCEDURE — 96375 TX/PRO/DX INJ NEW DRUG ADDON: CPT

## 2017-08-18 PROCEDURE — 25000132 ZZH RX MED GY IP 250 OP 250 PS 637: Performed by: HOSPITALIST

## 2017-08-18 PROCEDURE — 85025 COMPLETE CBC W/AUTO DIFF WBC: CPT | Performed by: FAMILY MEDICINE

## 2017-08-18 PROCEDURE — 99285 EMERGENCY DEPT VISIT HI MDM: CPT | Mod: 25

## 2017-08-18 PROCEDURE — 80053 COMPREHEN METABOLIC PANEL: CPT | Performed by: FAMILY MEDICINE

## 2017-08-18 PROCEDURE — 96365 THER/PROPH/DIAG IV INF INIT: CPT

## 2017-08-18 PROCEDURE — 25000128 H RX IP 250 OP 636: Performed by: FAMILY MEDICINE

## 2017-08-18 PROCEDURE — 25000131 ZZH RX MED GY IP 250 OP 636 PS 637: Performed by: HOSPITALIST

## 2017-08-18 RX ORDER — POTASSIUM CHLORIDE 1.5 G/1.58G
20-40 POWDER, FOR SOLUTION ORAL
Status: DISCONTINUED | OUTPATIENT
Start: 2017-08-18 | End: 2017-08-20 | Stop reason: HOSPADM

## 2017-08-18 RX ORDER — SIMVASTATIN 40 MG
40 TABLET ORAL DAILY
Status: DISCONTINUED | OUTPATIENT
Start: 2017-08-18 | End: 2017-08-20 | Stop reason: HOSPADM

## 2017-08-18 RX ORDER — ONDANSETRON 4 MG/1
4 TABLET, ORALLY DISINTEGRATING ORAL EVERY 6 HOURS PRN
Status: DISCONTINUED | OUTPATIENT
Start: 2017-08-18 | End: 2017-08-20 | Stop reason: HOSPADM

## 2017-08-18 RX ORDER — SODIUM CHLORIDE 9 MG/ML
1000 INJECTION, SOLUTION INTRAVENOUS CONTINUOUS
Status: DISCONTINUED | OUTPATIENT
Start: 2017-08-18 | End: 2017-08-19

## 2017-08-18 RX ORDER — KETOROLAC TROMETHAMINE 15 MG/ML
15 INJECTION, SOLUTION INTRAMUSCULAR; INTRAVENOUS ONCE
Status: COMPLETED | OUTPATIENT
Start: 2017-08-18 | End: 2017-08-18

## 2017-08-18 RX ORDER — POTASSIUM CHLORIDE 7.45 MG/ML
10 INJECTION INTRAVENOUS
Status: DISCONTINUED | OUTPATIENT
Start: 2017-08-18 | End: 2017-08-20 | Stop reason: HOSPADM

## 2017-08-18 RX ORDER — POTASSIUM CHLORIDE 1500 MG/1
20-40 TABLET, EXTENDED RELEASE ORAL
Status: DISCONTINUED | OUTPATIENT
Start: 2017-08-18 | End: 2017-08-20 | Stop reason: HOSPADM

## 2017-08-18 RX ORDER — VENLAFAXINE HYDROCHLORIDE 37.5 MG/1
75 CAPSULE, EXTENDED RELEASE ORAL
Status: DISCONTINUED | OUTPATIENT
Start: 2017-08-19 | End: 2017-08-20 | Stop reason: HOSPADM

## 2017-08-18 RX ORDER — CEFTRIAXONE SODIUM 1 G/50ML
1 INJECTION, SOLUTION INTRAVENOUS ONCE
Status: COMPLETED | OUTPATIENT
Start: 2017-08-18 | End: 2017-08-18

## 2017-08-18 RX ORDER — NICOTINE POLACRILEX 4 MG
15-30 LOZENGE BUCCAL
Status: DISCONTINUED | OUTPATIENT
Start: 2017-08-18 | End: 2017-08-20 | Stop reason: HOSPADM

## 2017-08-18 RX ORDER — ASPIRIN 81 MG/1
81 TABLET, CHEWABLE ORAL DAILY
Status: DISCONTINUED | OUTPATIENT
Start: 2017-08-19 | End: 2017-08-20 | Stop reason: HOSPADM

## 2017-08-18 RX ORDER — HYDROMORPHONE HYDROCHLORIDE 1 MG/ML
.3-.5 INJECTION, SOLUTION INTRAMUSCULAR; INTRAVENOUS; SUBCUTANEOUS
Status: DISCONTINUED | OUTPATIENT
Start: 2017-08-18 | End: 2017-08-20 | Stop reason: HOSPADM

## 2017-08-18 RX ORDER — LIDOCAINE 40 MG/G
CREAM TOPICAL
Status: DISCONTINUED | OUTPATIENT
Start: 2017-08-18 | End: 2017-08-20 | Stop reason: HOSPADM

## 2017-08-18 RX ORDER — FUROSEMIDE 40 MG
40 TABLET ORAL 2 TIMES DAILY
Status: DISCONTINUED | OUTPATIENT
Start: 2017-08-18 | End: 2017-08-20 | Stop reason: HOSPADM

## 2017-08-18 RX ORDER — ACETAMINOPHEN 325 MG/1
650 TABLET ORAL EVERY 4 HOURS PRN
Status: DISCONTINUED | OUTPATIENT
Start: 2017-08-18 | End: 2017-08-20 | Stop reason: HOSPADM

## 2017-08-18 RX ORDER — POTASSIUM CL/LIDO/0.9 % NACL 10MEQ/0.1L
10 INTRAVENOUS SOLUTION, PIGGYBACK (ML) INTRAVENOUS
Status: DISCONTINUED | OUTPATIENT
Start: 2017-08-18 | End: 2017-08-20 | Stop reason: HOSPADM

## 2017-08-18 RX ORDER — MAGNESIUM SULFATE HEPTAHYDRATE 40 MG/ML
4 INJECTION, SOLUTION INTRAVENOUS EVERY 4 HOURS PRN
Status: DISCONTINUED | OUTPATIENT
Start: 2017-08-18 | End: 2017-08-20 | Stop reason: HOSPADM

## 2017-08-18 RX ORDER — ACETAMINOPHEN 10 MG/ML
1000 INJECTION, SOLUTION INTRAVENOUS ONCE
Status: COMPLETED | OUTPATIENT
Start: 2017-08-18 | End: 2017-08-18

## 2017-08-18 RX ORDER — DEXTROSE MONOHYDRATE 25 G/50ML
25-50 INJECTION, SOLUTION INTRAVENOUS
Status: DISCONTINUED | OUTPATIENT
Start: 2017-08-18 | End: 2017-08-20 | Stop reason: HOSPADM

## 2017-08-18 RX ORDER — POLYETHYLENE GLYCOL 3350 17 G/17G
17 POWDER, FOR SOLUTION ORAL DAILY PRN
Status: DISCONTINUED | OUTPATIENT
Start: 2017-08-18 | End: 2017-08-20 | Stop reason: HOSPADM

## 2017-08-18 RX ORDER — ONDANSETRON 2 MG/ML
4 INJECTION INTRAMUSCULAR; INTRAVENOUS EVERY 6 HOURS PRN
Status: DISCONTINUED | OUTPATIENT
Start: 2017-08-18 | End: 2017-08-20 | Stop reason: HOSPADM

## 2017-08-18 RX ORDER — NALOXONE HYDROCHLORIDE 0.4 MG/ML
.1-.4 INJECTION, SOLUTION INTRAMUSCULAR; INTRAVENOUS; SUBCUTANEOUS
Status: DISCONTINUED | OUTPATIENT
Start: 2017-08-18 | End: 2017-08-20 | Stop reason: HOSPADM

## 2017-08-18 RX ORDER — AMOXICILLIN 250 MG
1-2 CAPSULE ORAL 2 TIMES DAILY PRN
Status: DISCONTINUED | OUTPATIENT
Start: 2017-08-18 | End: 2017-08-20 | Stop reason: HOSPADM

## 2017-08-18 RX ORDER — VANCOMYCIN HYDROCHLORIDE 1 G/200ML
1000 INJECTION, SOLUTION INTRAVENOUS EVERY 8 HOURS
Status: DISCONTINUED | OUTPATIENT
Start: 2017-08-19 | End: 2017-08-20 | Stop reason: HOSPADM

## 2017-08-18 RX ORDER — OXYCODONE HYDROCHLORIDE 5 MG/1
5 TABLET ORAL
Status: DISCONTINUED | OUTPATIENT
Start: 2017-08-18 | End: 2017-08-20 | Stop reason: HOSPADM

## 2017-08-18 RX ORDER — VANCOMYCIN HYDROCHLORIDE 1 G/200ML
1000 INJECTION, SOLUTION INTRAVENOUS ONCE
Status: COMPLETED | OUTPATIENT
Start: 2017-08-18 | End: 2017-08-18

## 2017-08-18 RX ADMIN — KETOROLAC TROMETHAMINE 15 MG: 15 INJECTION, SOLUTION INTRAMUSCULAR; INTRAVENOUS at 18:47

## 2017-08-18 RX ADMIN — ACETAMINOPHEN 1000 MG: 10 INJECTION, SOLUTION INTRAVENOUS at 20:59

## 2017-08-18 RX ADMIN — INSULIN GLARGINE 30 UNITS: 100 INJECTION, SOLUTION SUBCUTANEOUS at 23:17

## 2017-08-18 RX ADMIN — SIMVASTATIN 40 MG: 40 TABLET, FILM COATED ORAL at 23:21

## 2017-08-18 RX ADMIN — VANCOMYCIN HYDROCHLORIDE 1000 MG: 1 INJECTION, SOLUTION INTRAVENOUS at 20:55

## 2017-08-18 RX ADMIN — CEFAZOLIN SODIUM 1 G: 1 INJECTION, SOLUTION INTRAVENOUS at 23:21

## 2017-08-18 RX ADMIN — ENOXAPARIN SODIUM 40 MG: 40 INJECTION SUBCUTANEOUS at 23:22

## 2017-08-18 RX ADMIN — SODIUM CHLORIDE 1000 ML: 9 INJECTION, SOLUTION INTRAVENOUS at 20:10

## 2017-08-18 RX ADMIN — SODIUM CHLORIDE 1000 ML: 9 INJECTION, SOLUTION INTRAVENOUS at 18:45

## 2017-08-18 RX ADMIN — CEFTRIAXONE SODIUM 1 G: 1 INJECTION, SOLUTION INTRAVENOUS at 20:10

## 2017-08-18 NOTE — H&P
Cellulitis left foot and ankle  [facility]    History and Physical  Hospitalist       Date of Admission:  8/18/2017  Date of Service (when I saw the patient): 8/18/2017      Assessment & Plan   1. Acute cellulitis of right lower extremity    -vancomycin pharm to dose   -transition from ceftriaxone to ancef   -CRP add on   -elevate legs    2. Hx of T2DM: continue lantus qhs; continue metformin; SSI; hypoglycemia protocol    3. Hx of HLD: continue statin    4. Hx of lower extremity edema; continue lasix    5. Hx of Depression; continue effexor    Agus Ameya    Primary Care Physician   Nathanael Mena    Chief Complaint   Fever    History is obtained from the patient    History of Present Illness   Suzy Gallardo is a 57 year old female with past medical history noted below. She has prior history of lower extremity cellulitis and history of lymphedema. Over the last 1-2 days she started noticing progressive erythema of right lower extremity associated with fevers. She presented to ED where she was noted to have a temp 103F. Notable labs included normal WBC and lactic acid. She had a headache that has resolved. She was started on vancomycin and ceftriaxone and admitted to medicine service.     Past Medical History    I have reviewed this patient's medical history and updated it with pertinent information if needed.   Past Medical History:   Diagnosis Date     Anemia, unspecified 06/23/2004    Lali Monroe MD      Cellulitis and abscess of hand, except fingers and thumb 01/11/2002     Cellulitis and abscess of unspecified site 02/22/2002    Jaime Rodarte MD      Depressive disorder, not elsewhere classified 01/19/2004     Diabetes mellitus (H)      Diabetes mellitus without mention of complication, 11/30/2001     Endometriosis of uterus 12/19/2001     Follow-up examination, following unspecified surgery 10/02/2001     Ingrowing nail 03/21/2000     Metrorrhagia 07/08/2004     [place] disease   "2012    Nathanael Mena MD      Need for prophylactic vaccination and inoculation against influenza 2003     Other (abnormal) findings on radiological examination of breast 08/15/2002    Alfonso Patton MD      Other screening mammogram 2000     Pure hypercholesterolemia 1999    Cristian Bradley MD      Pure hyperglyceridemia 2000    Cristian Bradley MD      Routine general medical examination at a health care facility 2003     Sterilization 2001     Unspecified symptom associated with female genital organs 10/30/2001     Vaginitis and vulvovaginitis, unspecified 2001       Past Surgical History   I have reviewed this patient's surgical history and updated it with pertinent information if needed.  Past Surgical History:   Procedure Laterality Date     BREAST SURGERY      breast biopsy     COLONOSCOPY  5/3/2013    Procedure: COLONOSCOPY;  COLONOSCOPY;  Surgeon: Pablito Urbina MD;  Location: [facility]     ENDOMETRIAL SAMPLING (BIOPSY)       HYSTERECTOMY       LAPAROSCOPY       NOSE SURGERY       wisdom teeth removed         Prior to Admission Medications   Prior to Admission Medications   Prescriptions Last Dose Informant Patient Reported? Taking?   ASPIRIN PO 2017 at 2200  Yes Yes   Sig: Take 81 mg by mouth daily    Blood Glucose Monitoring Suppl (ONE TOUCH ULTRA) Unknown at Unknown time  Yes No   Si each strip TID AC   IBUPROFEN PO 2017 at 1400  Yes No   Insulin Pen Needle (BD U/F III SHORT PEN NEEDLE) 2017 at Unknown time  Yes Yes   Sig: See instructions.   Insulin Pen Needle (PEN NEEDLES \") 31G X 8 MM MISC 2017 at Unknown time  Yes Yes   MetFORMIN HCl (GLUCOPHAGE PO) 2017 at 0800  Yes Yes   Sig: Take 1,000 mg by mouth 2 times daily (with meals).   SIMVASTATIN PO 2017 at 2200  Yes Yes   Sig: Take 40 mg by mouth daily.   amoxicillin-clavulanate (AUGMENTIN) 875-125 MG per tablet 2017 at 0800  No Yes   Sig: Take 1 tablet by mouth " 2 times daily   calcium-vitamin D (CALTRATE) 600-400 MG-UNIT per tablet More than a month at Unknown time  Yes No   Sig: Take 1 tablet by mouth daily.   furosemide (LASIX) 40 MG tablet 8/18/2017 at 2200  No Yes   Sig: Take 1 tablet (40 mg) by mouth 2 times daily   insulin glargine (LANTUS SOLOSTAR) 100 UNIT/ML injection 8/17/2017 at 2200  Yes Yes   Sig: Inject 30 Units Subcutaneous At Bedtime    liraglutide (VICTOZA) 18 MG/3ML SOLN 8/18/2017 at 0800  Yes Yes   Sig: Inject 1.8 mg Subcutaneous daily    multivitamin, therapeutic with minerals (THERA-VIT-M) TABS More than a month at Unknown time  Yes No   Sig: Take 1 tablet by mouth daily.   sulfamethoxazole-trimethoprim (BACTRIM DS/SEPTRA DS) 800-160 MG per tablet 8/18/2017 at 0800  No Yes   Sig: Take 1 tablet by mouth 2 times daily   venlafaxine (EFFEXOR-XR) 75 MG 24 hr capsule 8/16/2017 at 2200  No Yes   Sig: TAKE TWO CAPSULES BY MOUTH DAILY      Facility-Administered Medications: None     Allergies   No Known Allergies    Social History   I have reviewed this patient's social history and updated it with pertinent information if needed. Suzy LITTLE Paulina  reports that she has never smoked. She has never used smokeless tobacco. She reports that she does not drink alcohol or use illicit drugs.    Family History   I have reviewed this patient's family history and updated it with pertinent information if needed.   Family History   Problem Relation Age of Onset     C.A.D. Father      CANCER Brother      gastric (cause of death)      Hypertension Other        Review of Systems   10 point ROS is negative except as noted in hpi    Physical Exam   Temp: 100.1  F (37.8  C) Temp src: Tympanic BP: 103/64 Pulse: 75 Heart Rate: 68 Resp: 16 SpO2: 97 % O2 Device: None (Room air)    Vital Signs with Ranges  Temp:  [100.1  F (37.8  C)-103  F (39.4  C)] 100.1  F (37.8  C)  Pulse:  [75-82] 75  Heart Rate:  [68-78] 68  Resp:  [16-18] 16  BP: (103-131)/(44-64) 103/64  SpO2:  [96 %-99 %] 97  %  0 lbs 0 oz    Constitutional:  in no acute distress   Eyes: EOMI  HEENT: NCAT. mmm  Respiratory: CTAB  Cardiovascular: RRR. Normal s1 s2  GI: soft, nt, nd  Genitourinary: deferred  Skin:warm; dry; right lower extremity: erythematous; edematous; warm to touch; bilateral lower extremity edema  Musculoskeletal:age appropriate muscle mass;  Neurologic: AOX3; CN grossly intact  Psychiatric: appropriate affect    Data   Data reviewed today:  I personally reviewed today;s labs    Recent Labs  Lab 08/18/17  1829   WBC 13.5*   HGB 11.6*   MCV 88         POTASSIUM 3.4   CHLORIDE 100   CO2 25   BUN 10   CR 0.76   ANIONGAP 8   EDWARDO 8.7   *   ALBUMIN 3.0*   PROTTOTAL 7.5   BILITOTAL 0.5   ALKPHOS 108   ALT 27   AST 20       No results found for this or any previous visit (from the past 24 hour(s)).     Agus Munguia MD

## 2017-08-18 NOTE — IP AVS SNAPSHOT
[facility]    [address] [address]    Phone:  [phone]    Fax:  [phone]                                       After Visit Summary   8/18/2017    Suzy Gallardo    MRN: [id number]           After Visit Summary Signature Page     I have received my discharge instructions, and my questions have been answered. I have discussed any challenges I see with this plan with the nurse or doctor.    ..........................................................................................................................................  Patient/Patient Representative Signature      ..........................................................................................................................................  Patient Representative Print Name and Relationship to Patient    ..................................................               ................................................  Date                                            Time    ..........................................................................................................................................  Reviewed by Signature/Title    ...................................................              ..............................................  Date                                                            Time

## 2017-08-18 NOTE — ED NOTES
Pt has hx of getting cellulitis. Last had June 2017 but did not have to be seen as she has a refillable prescription to take when sx begin. Started sulfamethoxazole-TMP DS. Take one tab bid X10 days and Augmentin 875mg one tab bid. Sx are increasing and had fever of 101.5 at home PTA. Last tylenol 1000mg at 1400. States her temp was 103 and 104 on Wednesday when sx began.

## 2017-08-18 NOTE — IP AVS SNAPSHOT
MRN:[id number]                      After Visit Summary   8/18/2017    Suzy Gallardo    MRN: [id number]           Thank you!     Thank you for choosing [facility] for your care. Our goal is always to provide you with excellent care. Hearing back from our patients is one way we can continue to improve our services. Please take a few minutes to complete the written survey that you may receive in the mail after you visit with us. Thank you!        Patient Information     Date Of Birth          1960        Designated Caregiver       Most Recent Value    Caregiver    Will someone help with your care after discharge? no      About your hospital stay     You were admitted on:  August 18, 2017 You last received care in the:  [facility]    You were discharged on:  August 20, 2017       Who to Call     For medical emergencies, please call 911.  For non-urgent questions about your medical care, please call your primary care provider or clinic, [phone]          Attending Provider     Provider Specialty    [facility]    Jayne Bradley, NP Nurse Practitioner    Ross Chacon MD Internal Medicine       Primary Care Provider Office Phone # Fax #    Nathanael Mena [phone]       When to contact your care team       Call your primary doctor if you have any of the following: temperature greater than 101, increased redness or swelling or increased pain.                  After Care Instructions     Activity       Your activity upon discharge: activity as tolerated            Diet       Follow this diet upon discharge: Orders Placed This Encounter      Consistent Carbohydrate Diet 3199-7095 Calories: Moderate Consistent CHO (4-6 CHO units/meal)            Discharge Instructions       Elevate left foot when at rest                  Follow-up Appointments     Follow-up and recommended labs and tests         "Follow up with primary care provider, Nathanael Mena, within 7 days for hospital follow- up.  No follow up labs or test are needed.                  Your next 10 appointments already scheduled     Aug 28, 2017  9:30 AM CDT   (Arrive by 9:15 AM)   SHORT with Nathanael Mena MD   [facility] ([facility] )    [address]  [facility] [place], [place] [address]   [phone]            Apr 17, 2018  9:00 AM CDT   M[facility]GRAM with  [facility] )    [address]  [place] [address]   [phone]           Do not wear any body powder, lotions, deodorant or perfume the day of the exam. Bring a list of all medications, especially hormones.  If your last mammogram was not done at [facility], please bring your mammogram films. We will need the name of your MD/PA to send a copy of your report.            Apr 17, 2018 10:00 AM CDT   (Arrive by 9:45 AM)   PHYSICAL with Lali Monroe MD   [facility] ([facility] )    [address]  [place] [address]   [phone]              Pending Results     Date and Time Order Name Status Description    8/18/2017 1815 Blood culture Preliminary             Statement of Approval     Ordered          08/20/17 1012  I have reviewed and agree with all the recommendations and orders detailed in this document.  EFFECTIVE NOW     Approved and electronically signed by:  Ross Chacon MD             Admission Information     Date & Time Provider Department Dept. Phone    8/18/2017 Ross Chacon MD [facility] [phone]      Your Vitals Were     Blood Pressure Pulse Temperature Respirations Height Weight    123/58 75 98.3  F (36.8  C) (Tympanic) 16 1.575 m (5' 2\") 83.7 kg (184 lb 8.4 oz)    Pulse Oximetry BMI (Body Mass Index)                98% 33.75 kg/m2          MyChart Information     [employer] gives you secure access to your electronic health record. " "If you see a primary care provider, you can also send messages to your care team and make appointments. If you have questions, please call your primary care clinic.  If you do not have a primary care provider, please call [phone] and they will assist you.        Care EveryWhere ID     This is your Care EveryWhere ID. This could be used by other organizations to access your [place] medical records  [id number]        Equal Access to Services     AVEL CHONG : Hadii alexandria carloso Sodonavanali, waaxda luqadaha, qaybta kaalmada miguel, rachel andersonyanyseun fam. So [facility] [phone].    ATENCIÓN: Si edenilsonla ramy, tiene a banuelos disposición servicios gratuitos de asistencia lingüística. Tiffanie al [phone].    We comply with applicable federal civil rights laws and [place] laws. We do not discriminate on the basis of race, color, national origin, age, disability sex, sexual orientation or gender identity.               Review of your medicines      START taking        Dose / Directions    cephALEXin 500 MG capsule   Commonly known as:  KEFLEX   Used for:  Hereditary edema of legs, Cellulitis of left lower extremity        Dose:  500 mg   Take 1 capsule (500 mg) by mouth 2 times daily for 7 days   Quantity:  14 capsule   Refills:  0         CONTINUE these medicines which have NOT CHANGED        Dose / Directions    ASPIRIN PO        Dose:  81 mg   Take 81 mg by mouth daily   Refills:  0       * BD U/F III SHORT PEN NEEDLE        See instructions.   Refills:  0       * pen needles 5/16\" 31G X 8 MM Misc        Refills:  0       calcium-vitamin D 600-400 MG-UNIT per tablet   Commonly known as:  CALTRATE        Dose:  1 tablet   Take 1 tablet by mouth daily.   Refills:  0       furosemide 40 MG tablet   Commonly known as:  LASIX   Used for:  Benign essential hypertension        Dose:  40 mg   Take 1 tablet (40 mg) by mouth 2 times daily   Quantity:  180 tablet   Refills:  2       GLUCOPHAGE PO        " Dose:  1000 mg   Take 1,000 mg by mouth 2 times daily (with meals).   Refills:  0       IBUPROFEN PO        Refills:  0       LANTUS SOLOSTAR 100 UNIT/ML injection   Generic drug:  insulin glargine        Dose:  30 Units   Inject 30 Units Subcutaneous At Bedtime   Refills:  0       liraglutide 18 MG/3ML Soln   Commonly known as:  VICTOZA        Dose:  1.8 mg   Inject 1.8 mg Subcutaneous daily   Refills:  0       multivitamin, therapeutic with minerals Tabs tablet        Dose:  1 tablet   Take 1 tablet by mouth daily.   Refills:  0       ONE TOUCH ULTRA        1 each strip TID AC   Refills:  0       SIMVASTATIN PO        Dose:  40 mg   Take 40 mg by mouth daily.   Refills:  0       sulfamethoxazole-trimethoprim 800-160 MG per tablet   Commonly known as:  BACTRIM DS/SEPTRA DS   Used for:  Hereditary edema of legs, Cellulitis of left lower extremity        Dose:  1 tablet   Take 1 tablet by mouth 2 times daily for 7 days   Quantity:  14 tablet   Refills:  0       venlafaxine 75 MG 24 hr capsule   Commonly known as:  EFFEXOR-XR   Used for:  Dysthymia        TAKE TWO CAPSULES BY MOUTH DAILY   Quantity:  180 capsule   Refills:  0       * Notice:  This list has 2 medication(s) that are the same as other medications prescribed for you. Read the directions carefully, and ask your doctor or other care provider to review them with you.      STOP taking     amoxicillin-clavulanate 875-125 MG per tablet   Commonly known as:  AUGMENTIN                Where to get your medicines      These medications were sent to [place] [address] [place] [address] [address]     Phone:  [phone]     cephALEXin 500 MG capsule    sulfamethoxazole-trimethoprim 800-160 MG per tablet                Protect others around you: Learn how to safely use, store and throw away your medicines at www.disposemymeds.org.             Medication List: This is a list of all your medications and when to take them.  "Check marks below indicate your daily home schedule. Keep this list as a reference.      Medications           Morning Afternoon Evening Bedtime As Needed    ASPIRIN PO   Take 81 mg by mouth daily   Last time this was given:  81 mg on 8/20/2017  8:20 AM                                * BD U/F III SHORT PEN NEEDLE   See instructions.                                * pen needles 5/16\" 31G X 8 MM Misc                                calcium-vitamin D 600-400 MG-UNIT per tablet   Commonly known as:  CALTRATE   Take 1 tablet by mouth daily.                                cephALEXin 500 MG capsule   Commonly known as:  KEFLEX   Take 1 capsule (500 mg) by mouth 2 times daily for 7 days                                furosemide 40 MG tablet   Commonly known as:  LASIX   Take 1 tablet (40 mg) by mouth 2 times daily   Last time this was given:  40 mg on 8/20/2017  8:20 AM                                GLUCOPHAGE PO   Take 1,000 mg by mouth 2 times daily (with meals).   Last time this was given:  1,000 mg on 8/20/2017  8:20 AM                                IBUPROFEN PO                                LANTUS SOLOSTAR 100 UNIT/ML injection   Inject 30 Units Subcutaneous At Bedtime   Last time this was given:  30 Units on 8/19/2017  9:07 PM   Generic drug:  insulin glargine                                liraglutide 18 MG/3ML Soln   Commonly known as:  VICTOZA   Inject 1.8 mg Subcutaneous daily                                multivitamin, therapeutic with minerals Tabs tablet   Take 1 tablet by mouth daily.                                ONE TOUCH ULTRA   1 each strip TID AC                                SIMVASTATIN PO   Take 40 mg by mouth daily.   Last time this was given:  40 mg on 8/19/2017  9:05 PM                                sulfamethoxazole-trimethoprim 800-160 MG per tablet   Commonly known as:  BACTRIM DS/SEPTRA DS   Take 1 tablet by mouth 2 times daily for 7 days                                venlafaxine 75 MG 24 " hr capsule   Commonly known as:  EFFEXOR-XR   TAKE TWO CAPSULES BY MOUTH DAILY   Last time this was given:  75 mg on 8/20/2017  8:20 AM                                * Notice:  This list has 2 medication(s) that are the same as other medications prescribed for you. Read the directions carefully, and ask your doctor or other care provider to review them with you.              More Information        Cephalexin tablets or capsules  What is this medicine?  CEPHALEXIN (sef a REINALDO in) is a cephalosporin antibiotic. It is used to treat certain kinds of bacterial infections It will not work for colds, flu, or other viral infections.  How should I use this medicine?  Take this medicine by mouth with a full glass of water. Follow the directions on the prescription label. This medicine can be taken with or without food. Take your medicine at regular intervals. Do not take your medicine more often than directed. Take all of your medicine as directed even if you think you are better. Do not skip doses or stop your medicine early.  Talk to your pediatrician regarding the use of this medicine in children. While this drug may be prescribed for selected conditions, precautions do apply.  What side effects may I notice from receiving this medicine?  Side effects that you should report to your doctor or health care professional as soon as possible:    allergic reactions like skin rash, itching or hives, swelling of the face, lips, or tongue    breathing problems    pain or trouble passing urine    redness, blistering, peeling or loosening of the skin, including inside the mouth    severe or watery diarrhea    unusually weak or tired    yellowing of the eyes, skin  Side effects that usually do not require medical attention (report to your doctor or health care professional if they continue or are bothersome):    gas or heartburn    genital or anal irritation    headache    joint or muscle pain    nausea, vomiting  What may interact  with this medicine?    probenecid    some other antibiotics  What if I miss a dose?  If you miss a dose, take it as soon as you can. If it is almost time for your next dose, take only that dose. Do not take double or extra doses. There should be at least 4 to 6 hours between doses.  Where should I keep my medicine?  Keep out of the reach of children.  Store at room temperature between 59 and 86 degrees F (15 and 30 degrees C). Throw away any unused medicine after the expiration date.  What should I tell my health care provider before I take this medicine?  They need to know if you have any of these conditions:    kidney disease    stomach or intestine problems, especially colitis    an unusual or allergic reaction to cephalexin, other cephalosporins, penicillins, other antibiotics, medicines, foods, dyes or preservatives    pregnant or trying to get pregnant    breast-feeding  What should I watch for while using this medicine?  Tell your doctor or health care professional if your symptoms do not begin to improve in a few days.  Do not treat diarrhea with over the counter products. Contact your doctor if you have diarrhea that lasts more than 2 days or if it is severe and watery.  If you have diabetes, you may get a false-positive result for sugar in your urine. Check with your doctor or health care professional.  NOTE:This sheet is a summary. It may not cover all possible information. If you have questions about this medicine, talk to your doctor, pharmacist, or health care provider. Copyright  2017 [employer]                Discharge Instructions for Cellulitis  You have been diagnosed with cellulitis. This is an infection in the deepest layer of the skin. In some cases, the infection also affects the muscle. Cellulitis is caused by bacteria. The bacteria can enter the body through broken skin. This can happen with a cut, scratch, animal bite, or an insect bite that has been scratched. You may have been treated  in the hospital with antibiotics and fluids. You will likely be given a prescription for antibiotics to take at home. This sheet will help you take care of yourself at home.  Home care  When you are home:    Take the prescribed antibiotic medicine you are given as directed until it is gone. Take it even if you feel better. It treats the infection and stops it from returning. Not taking all the medicine can make future infections hard to treat.    Keep the infected area clean.    When possible, raise the infected area above the level of your heart. This helps keep swelling down.    Talk with your healthcare provider if you are in pain. Ask what kind of over-the-counter medicine you can take for pain.    Apply clean bandages as advised.    Take your temperature once a day for a week.    Wash your hands often to prevent spreading the infection.  In the future, wash your hands before and after you touch cuts, scratches, or bandages. This will help prevent infection.   When to call your healthcare provider  Call your healthcare provider immediately if you have any of the following:    Difficulty or pain when moving the joints above or below the infected area    Discharge or pus draining from the area    Fever of 100.4 F (38 C) or higher, or as directed by your healthcare provider    Pain that gets worse in or around the infected     Redness that gets worse in or around the infected area, particularly if the area of redness expands to a wider area    Shaking chills    Swelling of the infected area    Vomiting   Date Last Reviewed: 8/1/2016 2000-2017 [employer]. [address] [place] [address]. All rights reserved. This information is not intended as a substitute for professional medical care. Always follow your healthcare professional's instructions.                Patient Education    Sulfamethoxazole, Trimethoprim Oral suspension    Sulfamethoxazole, Trimethoprim Oral  tablet    Sulfamethoxazole, Trimethoprim Solution for injection  Sulfamethoxazole, Trimethoprim Oral tablet  What is this medicine?  SULFAMETHOXAZOLE; TRIMETHOPRIM or SMX-TMP (suhl fuh meth OK gato zohl; trye METH oh prim) is a combination of a sulfonamide antibiotic and a second antibiotic, trimethoprim. It is used to treat or prevent certain kinds of bacterial infections. It will not work for colds, flu, or other viral infections.  This medicine may be used for other purposes; ask your health care provider or pharmacist if you have questions.  What should I tell my health care provider before I take this medicine?  They need to know if you have any of these conditions:    anemia    asthma    being treated with anticonvulsants    if you frequently drink alcohol containing drinks    kidney disease    liver disease    low level of folic acid or glucose-6-phosphate dehydrogenase    poor nutrition or malabsorption    porphyria    severe allergies    thyroid disorder    an unusual or allergic reaction to sulfamethoxazole, trimethoprim, sulfa drugs, other medicines, foods, dyes, or preservatives    pregnant or trying to get pregnant    breast-feeding  How should I use this medicine?  Take this medicine by mouth with a full glass of water. Follow the directions on the prescription label. Take your medicine at regular intervals. Do not take it more often than directed. Do not skip doses or stop your medicine early.  Talk to your pediatrician regarding the use of this medicine in children. Special care may be needed. This medicine has been used in children as young as 2 months of age.  Overdosage: If you think you have taken too much of this medicine contact a poison control center or emergency room at once.  NOTE: This medicine is only for you. Do not share this medicine with others.  What if I miss a dose?  If you miss a dose, take it as soon as you can. If it is almost time for your next dose, take only that dose. Do not  take double or extra doses.  What may interact with this medicine?  Do not take this medicine with any of the following medications:    aminobenzoate potassium    dofetilide    metronidazole  This medicine may also interact with the following medications:    ACE inhibitors like benazepril, enalapril, lisinopril, and ramipril    birth control pills    cyclosporine    digoxin    diuretics    indomethacin    medicines for diabetes    methenamine    methotrexate    phenytoin    potassium supplements    pyrimethamine    sulfinpyrazone    tricyclic antidepressants    warfarin  This list may not describe all possible interactions. Give your health care provider a list of all the medicines, herbs, non-prescription drugs, or dietary supplements you use. Also tell them if you smoke, drink alcohol, or use illegal drugs. Some items may interact with your medicine.  What should I watch for while using this medicine?  Tell your doctor or health care professional if your symptoms do not improve. Drink several glasses of water a day to reduce the risk of kidney problems.  Do not treat diarrhea with over the counter products. Contact your doctor if you have diarrhea that lasts more than 2 days or if it is severe and watery.  This medicine can make you more sensitive to the sun. Keep out of the sun. If you cannot avoid being in the sun, wear protective clothing and use a sunscreen. Do not use sun lamps or tanning beds/booths.  What side effects may I notice from receiving this medicine?  Side effects that you should report to your doctor or health care professional as soon as possible:    allergic reactions like skin rash or hives, swelling of the face, lips, or tongue    breathing problems    fever or chills, sore throat    irregular heartbeat, chest pain    joint or muscle pain    pain or difficulty passing urine    red pinpoint spots on skin    redness, blistering, peeling or loosening of the skin, including inside the  mouth    unusual bleeding or bruising    unusually weak or tired    yellowing of the eyes or skin  Side effects that usually do not require medical attention (report to your doctor or health care professional if they continue or are bothersome):    diarrhea    dizziness    headache    loss of appetite    nausea, vomiting    nervousness  This list may not describe all possible side effects. Call your doctor for medical advice about side effects. You may report side effects to FDA at 2-458-FQO-1570.  Where should I keep my medicine?  Keep out of the reach of children.  Store at room temperature between 20 to 25 degrees C (68 to 77 degrees F). Protect from light. Throw away any unused medicine after the expiration date.  NOTE:This sheet is a summary. It may not cover all possible information. If you have questions about this medicine, talk to your doctor, pharmacist, or health care provider. Copyright  2016 [employer]                Cellulitis  Cellulitis is an infection of the deep layers of skin. A break in the skin, such as a cut or scratch, can let bacteria under the skin. If the bacteria get to deep layers of the skin, it can be serious. If not treated, cellulitis can get into the bloodstream and lymph nodes. The infection can then spread throughout the body. This causes serious illness.  Cellulitis causes the affected skin to become red, swollen, warm, and sore. The reddened areas have a visible border. An open sore may leak fluid (pus). You may have a fever, chills, and pain.  Cellulitis is treated with antibiotics taken for 7 to 10 days. An open sore may be cleaned and covered with cool wet gauze. Symptoms should get better 1 to 2 days after treatment is started. Make sure to take all the antibiotics for the full number of days until they are gone. Keep taking the medicine even if your symptoms go away.  Home care  Follow these tips:    Limit the use of the part of your body with cellulitis.     If the  infection is on your leg, keep your leg raised while sitting. This will help to reduce swelling.    Take all of the antibiotic medicine exactly as directed until it is gone. Do not miss any doses, especially during the first 7 days. Don t stop taking the medicine when your symptoms get better.    Keep the affected area clean and dry.    Wash your hands with soap and warm water before and after touching your skin. Anyone else who touches your skin should also wash his or her hands. Don't share towels.  Follow-up care  Follow up with your healthcare provider, or as advised. If your infection does not go away on the first antibiotic, your healthcare provider will prescribe a different one.  When to seek medical advice  Call your healthcare provider right away if any of these occur:    Red areas that spread    Swelling or pain that gets worse    Fluid leaking from the skin (pus)    Fever higher of 100.4  F (38.0  C) or higher after 2 days on antibiotics  Date Last Reviewed: 9/1/2016 2000-2017 [employer]. [address] [place] [address], [place] [address]. All rights reserved. This information is not intended as a substitute for professional medical care. Always follow your healthcare professional's instructions.

## 2017-08-19 LAB
ANION GAP SERPL CALCULATED.3IONS-SCNC: 10 MMOL/L (ref 3–14)
BUN SERPL-MCNC: 10 MG/DL (ref 7–30)
CALCIUM SERPL-MCNC: 8 MG/DL (ref 8.5–10.1)
CHLORIDE SERPL-SCNC: 105 MMOL/L (ref 94–109)
CO2 SERPL-SCNC: 19 MMOL/L (ref 20–32)
CREAT SERPL-MCNC: 0.69 MG/DL (ref 0.52–1.04)
CRP SERPL-MCNC: 245 MG/L (ref 0–8)
ERYTHROCYTE [DISTWIDTH] IN BLOOD BY AUTOMATED COUNT: 14.2 % (ref 10–15)
GFR SERPL CREATININE-BSD FRML MDRD: 88 ML/MIN/1.7M2
GLUCOSE BLDC GLUCOMTR-MCNC: 186 MG/DL (ref 70–99)
GLUCOSE BLDC GLUCOMTR-MCNC: 196 MG/DL (ref 70–99)
GLUCOSE BLDC GLUCOMTR-MCNC: 219 MG/DL (ref 70–99)
GLUCOSE BLDC GLUCOMTR-MCNC: 239 MG/DL (ref 70–99)
GLUCOSE BLDC GLUCOMTR-MCNC: 284 MG/DL (ref 70–99)
GLUCOSE SERPL-MCNC: 234 MG/DL (ref 70–99)
HCT VFR BLD AUTO: 34.7 % (ref 35–47)
HGB BLD-MCNC: 11.3 G/DL (ref 11.7–15.7)
MCH RBC QN AUTO: 29.4 PG (ref 26.5–33)
MCHC RBC AUTO-ENTMCNC: 32.6 G/DL (ref 31.5–36.5)
MCV RBC AUTO: 90 FL (ref 78–100)
PLATELET # BLD AUTO: 169 10E9/L (ref 150–450)
POTASSIUM SERPL-SCNC: 4.5 MMOL/L (ref 3.4–5.3)
RBC # BLD AUTO: 3.85 10E12/L (ref 3.8–5.2)
SODIUM SERPL-SCNC: 134 MMOL/L (ref 133–144)
VANCOMYCIN SERPL-MCNC: 15.6 MG/L
WBC # BLD AUTO: 12.3 10E9/L (ref 4–11)

## 2017-08-19 PROCEDURE — 25000128 H RX IP 250 OP 636: Performed by: NURSE PRACTITIONER

## 2017-08-19 PROCEDURE — 86140 C-REACTIVE PROTEIN: CPT | Performed by: HOSPITALIST

## 2017-08-19 PROCEDURE — 25000131 ZZH RX MED GY IP 250 OP 636 PS 637: Performed by: HOSPITALIST

## 2017-08-19 PROCEDURE — 80202 ASSAY OF VANCOMYCIN: CPT | Performed by: NURSE PRACTITIONER

## 2017-08-19 PROCEDURE — 12000000 ZZH R&B MED SURG/OB

## 2017-08-19 PROCEDURE — 25000125 ZZHC RX 250: Performed by: NURSE PRACTITIONER

## 2017-08-19 PROCEDURE — 85027 COMPLETE CBC AUTOMATED: CPT | Performed by: HOSPITALIST

## 2017-08-19 PROCEDURE — 25000128 H RX IP 250 OP 636: Performed by: FAMILY MEDICINE

## 2017-08-19 PROCEDURE — 25000125 ZZHC RX 250: Performed by: HOSPITALIST

## 2017-08-19 PROCEDURE — 36416 COLLJ CAPILLARY BLOOD SPEC: CPT | Performed by: HOSPITALIST

## 2017-08-19 PROCEDURE — 25000132 ZZH RX MED GY IP 250 OP 250 PS 637: Performed by: HOSPITALIST

## 2017-08-19 PROCEDURE — 00000146 ZZHCL STATISTIC GLUCOSE BY METER IP

## 2017-08-19 PROCEDURE — 36415 COLL VENOUS BLD VENIPUNCTURE: CPT | Performed by: NURSE PRACTITIONER

## 2017-08-19 PROCEDURE — 80048 BASIC METABOLIC PNL TOTAL CA: CPT | Performed by: HOSPITALIST

## 2017-08-19 PROCEDURE — 99232 SBSQ HOSP IP/OBS MODERATE 35: CPT | Performed by: NURSE PRACTITIONER

## 2017-08-19 PROCEDURE — 25000128 H RX IP 250 OP 636: Performed by: HOSPITALIST

## 2017-08-19 RX ORDER — LIRAGLUTIDE 6 MG/ML
1.8 INJECTION SUBCUTANEOUS DAILY
Status: DISCONTINUED | OUTPATIENT
Start: 2017-08-19 | End: 2017-08-20 | Stop reason: HOSPADM

## 2017-08-19 RX ADMIN — FUROSEMIDE 40 MG: 40 TABLET ORAL at 08:48

## 2017-08-19 RX ADMIN — ACETAMINOPHEN 650 MG: 325 TABLET, FILM COATED ORAL at 06:45

## 2017-08-19 RX ADMIN — METFORMIN HYDROCHLORIDE 1000 MG: 1000 TABLET, FILM COATED ORAL at 17:15

## 2017-08-19 RX ADMIN — INSULIN ASPART 1 UNITS: 100 INJECTION, SOLUTION INTRAVENOUS; SUBCUTANEOUS at 07:39

## 2017-08-19 RX ADMIN — VENLAFAXINE HYDROCHLORIDE 75 MG: 37.5 CAPSULE, EXTENDED RELEASE ORAL at 07:40

## 2017-08-19 RX ADMIN — INSULIN ASPART 1 UNITS: 100 INJECTION, SOLUTION INTRAVENOUS; SUBCUTANEOUS at 17:14

## 2017-08-19 RX ADMIN — ENOXAPARIN SODIUM 40 MG: 40 INJECTION SUBCUTANEOUS at 23:39

## 2017-08-19 RX ADMIN — FUROSEMIDE 40 MG: 40 TABLET ORAL at 21:05

## 2017-08-19 RX ADMIN — SODIUM CHLORIDE 1000 ML: 9 INJECTION, SOLUTION INTRAVENOUS at 08:50

## 2017-08-19 RX ADMIN — INSULIN ASPART 2 UNITS: 100 INJECTION, SOLUTION INTRAVENOUS; SUBCUTANEOUS at 12:04

## 2017-08-19 RX ADMIN — METFORMIN HYDROCHLORIDE 1000 MG: 1000 TABLET, FILM COATED ORAL at 07:39

## 2017-08-19 RX ADMIN — VANCOMYCIN HYDROCHLORIDE 1000 MG: 1 INJECTION, SOLUTION INTRAVENOUS at 12:46

## 2017-08-19 RX ADMIN — ASPIRIN 81 MG 81 MG: 81 TABLET ORAL at 08:48

## 2017-08-19 RX ADMIN — VANCOMYCIN HYDROCHLORIDE 1000 MG: 1 INJECTION, SOLUTION INTRAVENOUS at 04:23

## 2017-08-19 RX ADMIN — CEFAZOLIN SODIUM 1 G: 1 INJECTION, SOLUTION INTRAVENOUS at 14:26

## 2017-08-19 RX ADMIN — LIRAGLUTIDE 1.8 MG: 6 INJECTION SUBCUTANEOUS at 12:13

## 2017-08-19 RX ADMIN — ACETAMINOPHEN 650 MG: 325 TABLET, FILM COATED ORAL at 15:55

## 2017-08-19 RX ADMIN — SIMVASTATIN 40 MG: 40 TABLET, FILM COATED ORAL at 21:05

## 2017-08-19 RX ADMIN — INSULIN GLARGINE 30 UNITS: 100 INJECTION, SOLUTION SUBCUTANEOUS at 21:07

## 2017-08-19 RX ADMIN — CEFAZOLIN SODIUM 1 G: 1 INJECTION, SOLUTION INTRAVENOUS at 23:39

## 2017-08-19 RX ADMIN — CEFAZOLIN SODIUM 1 G: 1 INJECTION, SOLUTION INTRAVENOUS at 06:38

## 2017-08-19 RX ADMIN — VANCOMYCIN HYDROCHLORIDE 1000 MG: 1 INJECTION, SOLUTION INTRAVENOUS at 21:00

## 2017-08-19 ASSESSMENT — PAIN DESCRIPTION - DESCRIPTORS
DESCRIPTORS: ACHING
DESCRIPTORS: ACHING

## 2017-08-19 NOTE — ED NOTES
Face to face report given with opportunity to observe patient.  Report given to Tanesha POWERS RN.    Misa Keyes  8/18/2017, 7:12 PM

## 2017-08-19 NOTE — ED PROVIDER NOTES
eMERGENCY dEPARTMENT eNCOUnter      CHIEF COMPLAINT    Chief Complaint   Patient presents with     Cellulitis     noted cellulitis beginning last tuesday in right foot and lower leg. June 2017 had same sx but controlled with home meds she has been given. fever 101.5 PTA       HPI    Suyz Gallardo is a 57 year old female who presents with redness of the skin over her left foot for the last 4 days.  She has a history of [place]'s syndrome causing severe lymphedema.  She has been admitted for cellulitis in the past.  She has antibiotics at home that she starts at the onset of symptoms.  She started this 2 days ago but slept most of the day yesterday and didn't take any.  Last night she had a fever of 104, today was 103.  She comes to the emergency department tonight.  No cough, no vomiting, she is  and lives in town with her .  Nonsmoker.    REVIEW OF SYSTEMS    Constitutional: postivie for  fever, chills  Respiratory:  No cough or shortness of breath   Cardiovascular:  No chest pain  GI: No nausea, vomiting  Musculoskeletal:  See HPI   Skin:  See HPI  All other review of systems are never  See HPI and nursing notes for additional information     PAST MEDICAL HISTORY/SURGICAL HISTORY     Past Medical History:   Diagnosis Date     Anemia, unspecified 06/23/2004    Lali Monroe MD      Cellulitis and abscess of hand, except fingers and thumb 01/11/2002     Cellulitis and abscess of unspecified site 02/22/2002    Jaime Rodarte MD      Depressive disorder, not elsewhere classified 01/19/2004     Diabetes mellitus (H)      Diabetes mellitus without mention of complication, 11/30/2001     Endometriosis of uterus 12/19/2001     Follow-up examination, following unspecified surgery 10/02/2001     Ingrowing nail 03/21/2000     Metrorrhagia 07/08/2004     [place] disease  03/01/2012    Nathanael Mena MD      Need for prophylactic vaccination and inoculation against influenza 11/06/2003     Other  "(abnormal) findings on radiological examination of breast 08/15/2002    Alfonso Patton MD      Other screening mammogram 07/25/2000     Pure hypercholesterolemia 11/26/1999    Cristian Bradley MD      Pure hyperglyceridemia 01/11/2000    Cristian Bradley MD      Routine general medical examination at a health care facility 04/07/2003     Sterilization 12/04/2001     Unspecified symptom associated with female genital organs 10/30/2001     Vaginitis and vulvovaginitis, unspecified 11/21/2001     Past Surgical History:   Procedure Laterality Date     BREAST SURGERY      breast biopsy     COLONOSCOPY  5/3/2013    Procedure: COLONOSCOPY;  COLONOSCOPY;  Surgeon: Pablito Urbina MD;  Location: [facility]     ENDOMETRIAL SAMPLING (BIOPSY)       HYSTERECTOMY       LAPAROSCOPY       NOSE SURGERY       wisdom teeth removed         CURRENT MEDICATIONS    Current Outpatient Rx   Medication Sig Dispense Refill     amoxicillin-clavulanate (AUGMENTIN) 875-125 MG per tablet Take 1 tablet by mouth 2 times daily 20 tablet 0     sulfamethoxazole-trimethoprim (BACTRIM DS/SEPTRA DS) 800-160 MG per tablet Take 1 tablet by mouth 2 times daily 20 tablet 0     venlafaxine (EFFEXOR-XR) 75 MG 24 hr capsule TAKE TWO CAPSULES BY MOUTH DAILY 180 capsule 0     furosemide (LASIX) 40 MG tablet Take 1 tablet (40 mg) by mouth 2 times daily 180 tablet 2     ASPIRIN PO Take 81 mg by mouth daily        calcium-vitamin D (CALTRATE) 600-400 MG-UNIT per tablet Take 1 tablet by mouth daily.       Insulin Pen Needle (BD U/F III SHORT PEN NEEDLE) See instructions.       MetFORMIN HCl (GLUCOPHAGE PO) Take 1,000 mg by mouth 2 times daily (with meals).       insulin glargine (LANTUS SOLOSTAR) 100 UNIT/ML injection Inject 30 Units Subcutaneous At Bedtime        Insulin Pen Needle (PEN NEEDLES 5/16\") 31G X 8 MM MISC        liraglutide (VICTOZA) 18 MG/3ML SOLN Inject 1.8 mg Subcutaneous daily        SIMVASTATIN PO Take 40 mg by mouth daily.       triamcinolone (NASACORT " "ALLERGY 24HR) 55 MCG/ACT Inhaler Spray 2 sprays into both nostrils daily       dextromethorphan-guaiFENesin (MUCINEX DM)  MG per 12 hr tablet Take 1 tablet by mouth every 12 hours       Loratadine (CLARITIN PO)        IBUPROFEN PO        multivitamin, therapeutic with minerals (THERA-VIT-M) TABS Take 1 tablet by mouth daily.       Blood Glucose Monitoring Suppl (ONE TOUCH ULTRA) 1 each strip TID AC         ALLERGIES    No Known Allergies    FAMILY HISTORY/SOCIAL HISTORY    Family History   Problem Relation Age of Onset     C.A.D. Father      CANCER Brother      gastric (cause of death)      Hypertension Other      Social History     Social History     Marital status:      Spouse name: N/A     Number of children: N/A     Years of education: N/A     Occupational History     Teacher [employer]     Social History Main Topics     Smoking status: Never Smoker     Smokeless tobacco: Never Used     Alcohol use No     Drug use: No     Sexual activity: Not on file     Other Topics Concern     Blood Transfusions Yes     Caffeine Concern No     Social History Narrative       PHYSICAL EXAM    VITAL SIGNS: /59  Pulse 82  Temp (!) 103  F (39.4  C) (Oral)  Resp 18  Ht 1.575 m (5' 2\")  SpO2 99%   Constitutional:  Well developed, Well nourished, she is alert but appears tired and feverish.  HEENT:  Normal mouth, throat.  No neck tenderness or lymphadenopathy  Respiratory:  No retractions, lungs are clear   Cardiovascular:  Heart rate normal, no JVD  GI:  Soft, No tenderness, no rebound or guarding   Musculoskeletal:  No acute bony deformity, no obvious joint effusion, has pitting edema of both feet to mid-shin   Integument:  No cyanosis, Indurated macular erythema with indistinct borders located in the left foot and ankle, extending to the lower leg  Vascular: Dorsalis pedis pulses 2+ equal bilaterally  Neurologic:  Alert & oriented, no slurred speech.     X-RAYS:   No orders to display " "      PROCEDURES: None     ED COURSE/MEDICAL DECISION MAKING     Vitals:    Vitals:    08/18/17 1754 08/18/17 1917   BP: (!) 131/44 126/59   Pulse: 82 82   Resp: 17 18   Temp: (!) 102.3  F (39.1  C) (!) 103  F (39.4  C)   TempSrc: Tympanic Oral   SpO2: 99%    Height: 1.575 m (5' 2\")        LABS:   Labs Ordered and Resulted from Time of ED Arrival Up to the Time of Departure from the ED   CBC WITH PLATELETS DIFFERENTIAL - Abnormal; Notable for the following:        Result Value    WBC 13.5 (*)     Hemoglobin 11.6 (*)     Hematocrit 33.7 (*)     Absolute Neutrophil 9.9 (*)     All other components within normal limits   COMPREHENSIVE METABOLIC PANEL - Abnormal; Notable for the following:     Glucose 229 (*)     Albumin 3.0 (*)     All other components within normal limits   LACTIC ACID   BLOOD CULTURE   BLOOD CULTURE            CLINICAL IMPRESSION:  1. Cellulitis          Plan:  She has a history of repeated cellulitis, [place]'s syndrome and now failed outpatient. Will admit for IV antibiotics, blood cultures are pending, lactate is 1.5 here.  Vitals are stable except for her temp which has gone up to 103 despite toradol.  Admitted to Dr. Munguia.       Khushbu Ford MD  08/18/17 2000    "

## 2017-08-19 NOTE — PLAN OF CARE
Face to face report given with opportunity to observe patient.    Report given to ADA Navarro   8/18/2017  11:30 PM

## 2017-08-19 NOTE — PLAN OF CARE
[facility] Inpatient Admission Note:    Patient admitted to 3112/3112-1 at approximately 2150 via cart accompanied by ER UA from emergency room . Report received from ADA Almendarez in SBAR format at 2135 via telephone. Patient transferred to bed via self.. Patient is alert and oriented X 3, denies pain; rates at 0 on 0-10 scale.  Patient oriented to room, unit, hourly rounding, and plan of care. Explained admission packet and patient handbook with patient bill of rights brochure. Will continue to monitor and document as needed.     Inpatient Nursing criteria listed below was met:      Health care directives status obtained and documented: Yes      Care Everywhere authorization obtained No      MRSA swab completed for patient 65 years and older: N/A      Patient identifies a surrogate decision maker: Yes If yes, who:Spouse, see facesheet      Core Measure diagnosis present:No. If yes, state diagnosis: NA       If initial lactic acid >2.0, repeat lactic acid drawn within one hour of arrival to unit: NA. If no, state reason: NA      Vaccination assessment and education completed: Yes   Vaccinations received prior to admission: Pneumovax yes  Influenza(seasonal)  N/A   Vaccination(s) ordered: not given today because has already received.      Clergy visit ordered if patient requests: No      Skin issues/needs documented: Yes      Isolation Patient: no Education given, correct sign in place and documentation row added to PCS:  No      Fall Prevention No: Care plan updated, education given and documented, sticker and magnet in place: N/A      Care Plan initiated: Yes      Education Documented (including assessment): Yes      Patient has discharge needs : No If yes, please explain:NA  2

## 2017-08-19 NOTE — PHARMACY-VANCOMYCIN DOSING SERVICE
Pharmacy Vancomycin Initial Note  Date of Service 2017  Patient's  1960  57 year old, female    Indication: Skin and Soft Tissue Infection    Current estimated CrCl = Estimated Creatinine Clearance: 85.3 mL/min (based on Cr of 0.76).    Creatinine for last 3 days  2017:  6:29 PM Creatinine 0.76 mg/dL    Recent Vancomycin Level(s) for last 3 days  No results found for requested labs within last 72 hours.      Vancomycin IV Administrations (past 72 hours)                   vancomycin (VANCOCIN) 1000 mg in dextrose 5% 200 mL PREMIX (mg) 1,000 mg New Bag 17                Nephrotoxins and other renal medications (Future)    Start     Dose/Rate Route Frequency Ordered Stop    17 0500  vancomycin (VANCOCIN) 1000 mg in dextrose 5% 200 mL PREMIX      1,000 mg Intravenous EVERY 8 HOURS 17 2244      17 2215  furosemide (LASIX) tablet 40 mg      40 mg Oral 2 TIMES DAILY 17 2204            Contrast Orders - past 72 hours     None                Plan:  1.  Start vancomycin  1000 mg IV q8h.   2.  Goal Trough Level: 15-20 mg/L   3.  Pharmacy will check trough levels as appropriate in 1-3 Days.    4. Serum creatinine levels will be ordered daily for the first week of therapy and at least twice weekly for subsequent weeks.    5. Western method utilized to dose vancomycin therapy: Method 1 in combination with other pharmacopredictive models.    Luzma Duran

## 2017-08-19 NOTE — PLAN OF CARE
Problem: Goal Outcome Summary  Goal: Goal Outcome Summary  Outcome: No Change  Patient is alert & oriented, makes needs known. Vital signs stable, temp 100.0. IV infusing without difficulty. Is independent in room, ambulating to the bathroom with no problems, steady on her feet. Is diabetic and required sliding scale insulin at breakfast.  Left foot/ankle is elevated on pillows.  Lungs are clear bilaterally, bowel sounds are positive.  Orders all meals independently.    Problem: Skin and Soft Tissue Infection (Adult)  Goal: Signs and Symptoms of Listed Potential Problems Will be Absent or Manageable (Skin and Soft Tissue Infection)  Signs and symptoms of listed potential problems will be absent or manageable by discharge/transition of care (reference Skin and Soft Tissue Infection (Adult) CPG).   Outcome: No Change  Left lower extremity continues to be pink in color, warm to touch and swollen.  Denied to need for pain medication, discomfort to foot is tolerable.     Temperature at noon 98.1. Blood glucose 284 received 2 units of coverage. Victoza started.      Face to face report given with opportunity to observe patient.    Report given to ADA Webster   8/19/2017  3:12 PM

## 2017-08-19 NOTE — ED NOTES
Provider would like to wait to start the antibiotics until blood cultures are drawn. Lab having difficulty; sending a third member up to attempt.

## 2017-08-19 NOTE — PLAN OF CARE
"Problem: Goal Outcome Summary  Goal: Goal Outcome Summary  Outcome: No Change  Patient slept well on shift. Left foot remains pink and warm in color. Patient denies any pain or discomfort. Vitals remain stable. /54  Pulse 75  Temp 97.3  F (36.3  C) (Tympanic)  Resp 18  Ht 1.575 m (5' 2\")  Wt 86.4 kg (190 lb 7.6 oz)  SpO2 98%  BMI 34.84 kg/m2. 0200 blood glucose level of 239. Antibiotic regimen continues. IV fluids continue to infuse.         Problem: Skin and Soft Tissue Infection (Adult)  Goal: Signs and Symptoms of Listed Potential Problems Will be Absent or Manageable (Skin and Soft Tissue Infection)  Signs and symptoms of listed potential problems will be absent or manageable by discharge/transition of care (reference Skin and Soft Tissue Infection (Adult) CPG).   Outcome: No Change    08/18/17 2883   Skin and Soft Tissue Infection   Problems Assessed (Skin and Soft Tissue Infection) all   Problems Present (Skin and Soft Tissue Infection) infection progression;situational response;pain         Face to face report given with opportunity to observe patient.    Report given to ADA Robbins   8/19/2017  7:18 AM    ]  "

## 2017-08-19 NOTE — PROVIDER NOTIFICATION
MD notified of no diet order. MD states to place patient on a moderate consistent carb diet. Orders placed.

## 2017-08-19 NOTE — PROGRESS NOTES
[facility]    Hospitalist Progress Note    Date of Service (when I saw the patient): 08/19/2017    Assessment & Plan   Suzy Gallardo is a 57 year old female who was admitted on 8/18/2017.      Cellulitis left foot: Patient notes improvement in redness,swelling and pain already this morning. Afebrile this morning, headache resolved. WBC and CRP improving. Redness is well within marked borders. Will continue Vancomycin x 24 hours awaiting culture results. She frequently treats cellulitis at home with ongoing prescription of amoxicillin and Bactrim so she is at higher risk of resistant organisms.  She does not have any signs of sepsis.       God[place] disease: Chronic edema at baseline. She received significant amount of IVF on arrival due to high fever and concern for bacteremia/sepsis. However she has no indicators of sepsis and is maintaining oral hydration. Will dc IVF and continue home dose lasix.       Type 2 diabetes mellitus without complication (H): Continue Metformin, Victoza, Lantus.    DVT Prophylaxis: Enoxaparin (Lovenox) SQ  Code Status: Full Code    Disposition: Expected discharge in 1-2 days once stable.    Jayne Bradley, CNP    Interval History   Feels foot is improving, did not get much sleep last night. Denies headache this morning.     -Data reviewed today: I reviewed all new labs and imaging results over the last 24 hours. I personally reviewed no images or EKG's today.    Physical Exam   Temp: 100  F (37.8  C) Temp src: Tympanic BP: (!) 114/49 Pulse: 75 Heart Rate: 78 Resp: 18 SpO2: 98 % O2 Device: None (Room air)    Vitals:    08/18/17 2157   Weight: 86.4 kg (190 lb 7.6 oz)     Vital Signs with Ranges  Temp:  [97.3  F (36.3  C)-103  F (39.4  C)] 100  F (37.8  C)  Pulse:  [75-82] 75  Heart Rate:  [68-78] 78  Resp:  [16-18] 18  BP: (103-131)/(44-64) 114/49  SpO2:  [96 %-99 %] 98 %  I/O last 3 completed shifts:  In: 1006 [I.V.:1006]  Out: -     Peripheral IV 08/18/17 Right Upper arm  (Active)   Site Assessment [facility] 8/19/2017  7:59 AM   Line Status Infusing 8/19/2017  7:59 AM   Phlebitis Scale 0-->no symptoms 8/19/2017  7:59 AM   Infiltration Scale 0 8/19/2017  7:59 AM   Number of days:1     Line/device assessment(s) completed for medical necessity    Constitutional: Sleeping but arouses easily, no acute distress, non ill appearing  Respiratory: Clear bilaterally, no wheezes, crackles, rhonchi.  Cardiovascular: HRR, no murmurs, rubs,thirlls. Neck veins flat. Bilateral non pitting pedal edema noted.  GI: Obese,soft,nontender. Bowel sounds hypoactive.  Skin/Integumen: Erythema to left lower ankle and foot is patchy with ill defined border. Red areas is within marked borders from ED. Area is still warm to touch. Planter surface of foot is not erythremic or warm to touch. No open areas or wounds are identified.         Medications        liraglutide  1.8 mg Subcutaneous Daily     ceFAZolin  1 g Intravenous Q8H     aspirin chewable tablet 81 mg  81 mg Oral Daily     furosemide  40 mg Oral BID     insulin glargine  30 Units Subcutaneous At Bedtime     metFORMIN (GLUCOPHAGE) tablet 1,000 mg  1,000 mg Oral BID w/meals     simvastatin (ZOCOR) tablet 40 mg  40 mg Oral Daily     venlafaxine  75 mg Oral Daily with breakfast     sodium chloride (PF)  3 mL Intracatheter Q8H     enoxaparin  40 mg Subcutaneous Q24H     insulin aspart  1-3 Units Subcutaneous TID AC     insulin aspart  1-3 Units Subcutaneous At Bedtime     vancomycin (VANCOCIN) IV  1,000 mg Intravenous Q8H       Data     Recent Labs  Lab 08/19/17  0608 08/18/17  1829   WBC 12.3* 13.5*   HGB 11.3* 11.6*   MCV 90 88    203    133   POTASSIUM 4.5 3.4   CHLORIDE 105 100   CO2 19* 25   BUN 10 10   CR 0.69 0.76   ANIONGAP 10 8   EDWARDO 8.0* 8.7   * 229*   ALBUMIN  --  3.0*   PROTTOTAL  --  7.5   BILITOTAL  --  0.5   ALKPHOS  --  108   ALT  --  27   AST  --  20       No results found for this or any previous visit (from the past 24  hour(s)).

## 2017-08-19 NOTE — PLAN OF CARE
Problem: Goal Outcome Summary  Goal: Goal Outcome Summary  Outcome: No Change  Admitted to room 3112 at 2150.  Has cellulitis of left foot/ankle, area is outlined.  Temp: 100.1, did received IV Tylenol in the ER.  All other VSS.  States headache pain rated 4/10 at that this a tolerable amount and denies need for pain med.  Denies pain in left foot unless she moves it or it gets bumped.  IV site in right AC is patent with NS @125ml/hr.

## 2017-08-20 VITALS
OXYGEN SATURATION: 98 % | TEMPERATURE: 98.3 F | HEART RATE: 75 BPM | DIASTOLIC BLOOD PRESSURE: 58 MMHG | HEIGHT: 62 IN | SYSTOLIC BLOOD PRESSURE: 123 MMHG | WEIGHT: 184.53 LBS | RESPIRATION RATE: 16 BRPM | BODY MASS INDEX: 33.96 KG/M2

## 2017-08-20 LAB
ANION GAP SERPL CALCULATED.3IONS-SCNC: 9 MMOL/L (ref 3–14)
BUN SERPL-MCNC: 8 MG/DL (ref 7–30)
CALCIUM SERPL-MCNC: 9.1 MG/DL (ref 8.5–10.1)
CHLORIDE SERPL-SCNC: 106 MMOL/L (ref 94–109)
CO2 SERPL-SCNC: 25 MMOL/L (ref 20–32)
CREAT SERPL-MCNC: 0.57 MG/DL (ref 0.52–1.04)
CREAT SERPL-MCNC: 0.58 MG/DL (ref 0.52–1.04)
ERYTHROCYTE [DISTWIDTH] IN BLOOD BY AUTOMATED COUNT: 13.9 % (ref 10–15)
GFR SERPL CREATININE-BSD FRML MDRD: >90 ML/MIN/1.7M2
GFR SERPL CREATININE-BSD FRML MDRD: >90 ML/MIN/1.7M2
GLUCOSE BLDC GLUCOMTR-MCNC: 171 MG/DL (ref 70–99)
GLUCOSE BLDC GLUCOMTR-MCNC: 183 MG/DL (ref 70–99)
GLUCOSE SERPL-MCNC: 165 MG/DL (ref 70–99)
HCT VFR BLD AUTO: 37.1 % (ref 35–47)
HGB BLD-MCNC: 12.1 G/DL (ref 11.7–15.7)
MCH RBC QN AUTO: 29.5 PG (ref 26.5–33)
MCHC RBC AUTO-ENTMCNC: 32.6 G/DL (ref 31.5–36.5)
MCV RBC AUTO: 91 FL (ref 78–100)
PLATELET # BLD AUTO: 213 10E9/L (ref 150–450)
POTASSIUM SERPL-SCNC: 3.1 MMOL/L (ref 3.4–5.3)
RBC # BLD AUTO: 4.1 10E12/L (ref 3.8–5.2)
SODIUM SERPL-SCNC: 140 MMOL/L (ref 133–144)
VANCOMYCIN SERPL-MCNC: 12.7 MG/L
WBC # BLD AUTO: 8.6 10E9/L (ref 4–11)

## 2017-08-20 PROCEDURE — 25000125 ZZHC RX 250: Performed by: HOSPITALIST

## 2017-08-20 PROCEDURE — 25000128 H RX IP 250 OP 636: Performed by: NURSE PRACTITIONER

## 2017-08-20 PROCEDURE — 36415 COLL VENOUS BLD VENIPUNCTURE: CPT | Performed by: NURSE PRACTITIONER

## 2017-08-20 PROCEDURE — 25000128 H RX IP 250 OP 636: Performed by: HOSPITALIST

## 2017-08-20 PROCEDURE — 80202 ASSAY OF VANCOMYCIN: CPT | Performed by: NURSE PRACTITIONER

## 2017-08-20 PROCEDURE — 85027 COMPLETE CBC AUTOMATED: CPT | Performed by: INTERNAL MEDICINE

## 2017-08-20 PROCEDURE — 99239 HOSP IP/OBS DSCHRG MGMT >30: CPT | Performed by: INTERNAL MEDICINE

## 2017-08-20 PROCEDURE — 00000146 ZZHCL STATISTIC GLUCOSE BY METER IP

## 2017-08-20 PROCEDURE — 36416 COLLJ CAPILLARY BLOOD SPEC: CPT | Performed by: INTERNAL MEDICINE

## 2017-08-20 PROCEDURE — 82565 ASSAY OF CREATININE: CPT | Performed by: HOSPITALIST

## 2017-08-20 PROCEDURE — 80048 BASIC METABOLIC PNL TOTAL CA: CPT | Performed by: INTERNAL MEDICINE

## 2017-08-20 PROCEDURE — 25000132 ZZH RX MED GY IP 250 OP 250 PS 637: Performed by: HOSPITALIST

## 2017-08-20 RX ORDER — CEPHALEXIN 500 MG/1
500 CAPSULE ORAL 2 TIMES DAILY
Qty: 14 CAPSULE | Refills: 0 | Status: SHIPPED | OUTPATIENT
Start: 2017-08-20 | End: 2017-08-27

## 2017-08-20 RX ORDER — SULFAMETHOXAZOLE/TRIMETHOPRIM 800-160 MG
1 TABLET ORAL 2 TIMES DAILY
Qty: 14 TABLET | Refills: 0 | Status: SHIPPED | OUTPATIENT
Start: 2017-08-20 | End: 2017-08-27

## 2017-08-20 RX ADMIN — VENLAFAXINE HYDROCHLORIDE 75 MG: 37.5 CAPSULE, EXTENDED RELEASE ORAL at 08:20

## 2017-08-20 RX ADMIN — FUROSEMIDE 40 MG: 40 TABLET ORAL at 08:20

## 2017-08-20 RX ADMIN — METFORMIN HYDROCHLORIDE 1000 MG: 1000 TABLET, FILM COATED ORAL at 08:20

## 2017-08-20 RX ADMIN — ASPIRIN 81 MG 81 MG: 81 TABLET ORAL at 08:20

## 2017-08-20 RX ADMIN — CEFAZOLIN SODIUM 1 G: 1 INJECTION, SOLUTION INTRAVENOUS at 05:44

## 2017-08-20 RX ADMIN — LIRAGLUTIDE 1.8 MG: 6 INJECTION SUBCUTANEOUS at 08:22

## 2017-08-20 RX ADMIN — VANCOMYCIN HYDROCHLORIDE 1000 MG: 1 INJECTION, SOLUTION INTRAVENOUS at 06:14

## 2017-08-20 RX ADMIN — INSULIN ASPART 1 UNITS: 100 INJECTION, SOLUTION INTRAVENOUS; SUBCUTANEOUS at 08:20

## 2017-08-20 ASSESSMENT — PAIN DESCRIPTION - DESCRIPTORS: DESCRIPTORS: ACHING;HEADACHE

## 2017-08-20 NOTE — PHARMACY-VANCOMYCIN DOSING SERVICE
Pharmacy Vancomycin Note  Date of Service 2017  Patient's  1960   57 year old, female    Indication: Skin and Soft Tissue Infection  Goal Trough Level: 15-20 mg/L  Day of Therapy: 3  Current Vancomycin regimen:  1000 mg IV q8h    Current estimated CrCl = Estimated Creatinine Clearance: 111.8 mL/min (based on Cr of 0.57).    Creatinine for last 3 days  2017:  6:29 PM Creatinine 0.76 mg/dL  2017:  6:08 AM Creatinine 0.69 mg/dL  2017:  5:21 AM Creatinine 0.58 mg/dL;  8:10 AM Creatinine 0.57 mg/dL    Recent Vancomycin Levels (past 3 days)  2017:  8:17 PM Vancomycin Level 15.6 mg/L  2017:  5:20 AM Vancomycin Level 12.7 mg/L    Vancomycin IV Administrations (past 72 hours)                   vancomycin (VANCOCIN) 1000 mg in dextrose 5% 200 mL PREMIX (mg) 1,000 mg Started 17 0614     1,000 mg New Bag 17 2100     1,000 mg New Bag  1246     1,000 mg New Bag  0423    vancomycin (VANCOCIN) 1000 mg in dextrose 5% 200 mL PREMIX (mg) 1,000 mg New Bag 17 205                Nephrotoxins and other renal medications (Future)    Start     Dose/Rate Route Frequency Ordered Stop    17 0500  vancomycin (VANCOCIN) 1000 mg in dextrose 5% 200 mL PREMIX      1,000 mg Intravenous EVERY 8 HOURS 17 2244      17 2215  furosemide (LASIX) tablet 40 mg      40 mg Oral 2 TIMES DAILY 17 2204               Contrast Orders - past 72 hours     None          Interpretation of levels and current regimen:  Trough level is  Therapeutic    Has serum creatinine changed > 50% in last 72 hours: No    Renal Function: Improving    Plan:  1. Continue Current Dose. First vancomycin trough of 15.6 is within the goal range of 15-20. Second trough of 12.7 was drawn at inappropriate time and a less accurate measure compared to initial trough. Based on the patient's clinical improvement including decreasing WBC and temperature and improvement in redness, swelling, and pain in  cellulitis will continue with vancomycin dose and frequency.   2. Pharmacy will check trough levels as appropriate in 1-3 Days.    3. Serum creatinine levels will be ordered daily for the first week of therapy and at least twice weekly for subsequent weeks.      Sheron Hathaway        .

## 2017-08-20 NOTE — PLAN OF CARE
Problem: Patient Goal: Social Work Focus  Goal: 1. Patient Goal: Social Work Focus  Assessment completed see flowsheet.     Met with patient, Suzy. Suzy lives at home with her  and her two cats. Suzy sees Dr. Nathanael Mena for her primary care, she also sees Dr. Craft in [place] for her diabetes. Suzy uses [facility] pharmacy in [place], and is not a . Suzy shared that she plans to go home today, and her  will provide transportation. She has no needs or concerns at this time.            Lives with: spouse, (2 cats)     Living at: house     Equipment used:  None            Primary PCP: Nathanael Mena     POA/Guardian: no     Health Care Directive: NO     Pharmacy: [facility]              :  none     Homecare/County Services:   none        Adequate Resources for needs (housing, utilities, food/med): YES     Meds and appointments management: YES     Work: YES     Transportation: YES               Able to Return to Prior Living Arrangements: YES     Nelson: NO        STACY: low

## 2017-08-20 NOTE — PLAN OF CARE
"Problem: Goal Outcome Summary  Goal: Goal Outcome Summary  Outcome: Improving  Patient rested well on shift. Denies any pain or discomfort on shift. Fever broke during the night. Patient diaphoretic and required a complete bed change. Left foot continues to become less pink. Boarders are receeding. Vitals remain stable. /58  Pulse 75  Temp 98.3  F (36.8  C) (Tympanic)  Resp 16  Ht 1.575 m (5' 2\")  Wt 86.4 kg (190 lb 7.6 oz)  SpO2 97%  BMI 34.84 kg/m2. Antibiotic regimen continues. Awaiting AM Vanco trough level.       0615- Trough received. Within parameters. Antibiotic started.     Problem: Skin and Soft Tissue Infection (Adult)  Goal: Signs and Symptoms of Listed Potential Problems Will be Absent or Manageable (Skin and Soft Tissue Infection)  Signs and symptoms of listed potential problems will be absent or manageable by discharge/transition of care (reference Skin and Soft Tissue Infection (Adult) CPG).   Outcome: No Change    08/19/17 1600   Skin and Soft Tissue Infection   Problems Assessed (Skin and Soft Tissue Infection) all   Problems Present (Skin and Soft Tissue Infection) infection progression         Face to face report given with opportunity to observe patient.    Report given to ADA Robbins   8/20/2017  7:18 AM      "

## 2017-08-20 NOTE — PLAN OF CARE
"Problem: Goal Outcome Summary  Goal: Goal Outcome Summary  Outcome: Adequate for Discharge Date Met:  08/20/17  Alert & oriented, vital signs stable, afebrile.  Is independent in room. States she is \"feeling much better.\"  Plan is to discharge home latter today.    Problem: Skin and Soft Tissue Infection (Adult)  Goal: Signs and Symptoms of Listed Potential Problems Will be Absent or Manageable (Skin and Soft Tissue Infection)  Signs and symptoms of listed potential problems will be absent or manageable by discharge/transition of care (reference Skin and Soft Tissue Infection (Adult) CPG).   Outcome: Adequate for Discharge Date Met:  08/20/17  Patient's left foot has decreased in redness.      "

## 2017-08-20 NOTE — PLAN OF CARE
Problem: Goal Outcome Summary  Goal: Goal Outcome Summary  Outcome: Improving  Pt feet swollen, no open areas pt keeps them elevated. Received tylenol for c/o of headache which resolved. Pt has no complaints of nausea makes needs known, uses call light and independent in bathroom voiding without difficulty    Problem: Skin and Soft Tissue Infection (Adult)  Goal: Signs and Symptoms of Listed Potential Problems Will be Absent or Manageable (Skin and Soft Tissue Infection)  Signs and symptoms of listed potential problems will be absent or manageable by discharge/transition of care (reference Skin and Soft Tissue Infection (Adult) CPG).   Outcome: Improving    08/19/17 1600   Skin and Soft Tissue Infection   Problems Assessed (Skin and Soft Tissue Infection) all   Problems Present (Skin and Soft Tissue Infection) infection progression

## 2017-08-20 NOTE — PROGRESS NOTES
Name: Suzy Gallardo    MRN#: [id number]    Reason for Hospitalization: Cellulitis [L03.90]    STACY: low    Discharge Date: August 20, 2017    Patient / Family response to discharge plan: agreeable    Follow-Up Appt: Future Appointments  Date Time Provider Department Center   8/28/2017 9:30 AM Nathanael Mena MD [facility]   4/17/2018 9:00 AM [facility]   4/17/2018 10:00 AM Lali Monroe MD [facility]       Other Providers (Care Coordinator, County Services, PCA services etc): No    Discharge Disposition: home    Rosario Yon

## 2017-08-20 NOTE — DISCHARGE SUMMARY
[facility]    Discharge Summary  Hospitalist    Date of Admission:  8/18/2017  Date of Discharge:  8/20/2017 10:15 AM  Discharging Provider: Ross Chacon  Date of Service (when I saw the patient): 08/22/17    Discharge Diagnoses   1. Cellulitis left foot  2. [place] disease  3. Diabetes mellitus type 2 without complication    History of Present Illness     Suzy Gallardo is a 57 year old female with past medical history noted below. She has prior history of lower extremity cellulitis and history of lymphedema. Over the last 1-2 days she started noticing progressive erythema of right lower extremity associated with fevers. She presented to ED where she was noted to have a temp 103F. Notable labs included normal WBC and lactic acid. She had a headache that has resolved. She was started on vancomycin and ceftriaxone and admitted to medicine service.   Hospital Course   Suzy Gallardo was admitted on 8/18/2017.  The following problems were addressed during her hospitalization:    1. Cellulitis left foot:   Patient with history of [place] disease and has had complications of recurrent cellulitis in the past. She presented with high fever to 104 at home and progressive erythema and swelling. She does have ongoing prescription of amoxicillin and Bactrim available to her as outpatient, but she admits she probably did not start antibiotics soon enough, thinking that she may have had more of a viral illness at first given she was having fevers and body aches. She had started her home antibiotics only 1.5 days prior to presentation. She had persistent high fever on presentation to the ED, elevated WBC and normal lactic acid. She was started on vancomycin and ceftriaxone, then ceftriaxone changed to cefazolin upon admission to the medical floor. By the subsequent day she was afebrile with WBC and CRP trending down and erythema swelling was regressing from demarcated borders upon admission. She stayed an additional  day with further marked improvement in the swelling, pain and erythema. She remains afebrile with WBC now normalized. Do not feel that she failed her outpatient antibiotics given that she had only recently started them prior to presentation. Blood cultures showed no growth. Plan to discharge with oral Bactrim and Keflex.      2. [place] disease:   Chronic edema at baseline. She received significant amount of IVF on arrival due to high fever and concern for bacteremia/sepsis which eventually was ruled out. She has no indicators of sepsis and was maintaining oral hydration, so IVF were discontinued and have resumed her home dose lasix.     3. Type 2 diabetes mellitus without complication:  Continued Metformin, Victoza, Lantus.    Pending Results     Unresulted Labs Ordered in the Past 30 Days of this Admission     Date and Time Order Name Status Description    8/18/2017 1815 Blood culture Preliminary           Code Status   Full Code       Primary Care Physician   Nathanael Mena    Physical Exam   Temp: 98.3  F (36.8  C) Temp src: Tympanic BP: 123/58   Heart Rate: 65 Resp: 16 SpO2: 98 % O2 Device: None (Room air)    Vitals:    08/18/17 2157 08/20/17 0555   Weight: 86.4 kg (190 lb 7.6 oz) 83.7 kg (184 lb 8.4 oz)     Vital Signs with Ranges  Temp:  [97.3  F (36.3  C)-99.7  F (37.6  C)] 98.3  F (36.8  C)  Heart Rate:  [65-79] 65  Resp:  [15-16] 16  BP: (119-135)/(45-58) 123/58  SpO2:  [97 %-98 %] 98 %  I/O last 3 completed shifts:  In: 1834 [P.O.:690; I.V.:1144]  Out: -     Constitutional: Alert and oriented X 3. No distress   Respiratory: CTA bilaterally. No wheezes or ronchi.    Cardiovascular: RRR. No murmurs, rubs, gallops. Normal S1/S2   GI: Soft, NTND, no organomegaly. Bowel sounds present   Integument: Warm, dry   Extremities:  No edema. Right lower extremity with erythema and swelling involving mainly just the right foot now and regressed from the demarcated borders upon admission.       Discharge Disposition    Discharged to home  Condition at discharge: Stable    Consultations This Hospital Stay   PHARMACY TO DOSE VANCO    Time Spent on this Encounter   I, Ross Chacon, personally saw the patient today and spent greater than 30 minutes discharging this patient.    Discharge Orders     Follow-up and recommended labs and tests    Follow up with primary care provider, Nathanael Mena, within 7 days for hospital follow- up.  No follow up labs or test are needed.     Activity   Your activity upon discharge: activity as tolerated     When to contact your care team   Call your primary doctor if you have any of the following: temperature greater than 101, increased redness or swelling or increased pain.     Discharge Instructions   Elevate left foot when at rest     Full Code     Diet   Follow this diet upon discharge: Orders Placed This Encounter     Consistent Carbohydrate Diet 3813-9232 Calories: Moderate Consistent CHO (4-6 CHO units/meal)       Discharge Medications   Current Discharge Medication List      START taking these medications    Details   cephALEXin (KEFLEX) 500 MG capsule Take 1 capsule (500 mg) by mouth 2 times daily for 7 days  Qty: 14 capsule, Refills: 0    Associated Diagnoses: Hereditary edema of legs; Cellulitis of left lower extremity         CONTINUE these medications which have CHANGED    Details   sulfamethoxazole-trimethoprim (BACTRIM DS/SEPTRA DS) 800-160 MG per tablet Take 1 tablet by mouth 2 times daily for 7 days  Qty: 14 tablet, Refills: 0    Associated Diagnoses: Hereditary edema of legs; Cellulitis of left lower extremity         CONTINUE these medications which have NOT CHANGED    Details   venlafaxine (EFFEXOR-XR) 75 MG 24 hr capsule TAKE TWO CAPSULES BY MOUTH DAILY  Qty: 180 capsule, Refills: 0    Associated Diagnoses: Dysthymia      furosemide (LASIX) 40 MG tablet Take 1 tablet (40 mg) by mouth 2 times daily  Qty: 180 tablet, Refills: 2    Associated Diagnoses: Benign essential  "hypertension      ASPIRIN PO Take 81 mg by mouth daily       !! Insulin Pen Needle (BD U/F III SHORT PEN NEEDLE) See instructions.      MetFORMIN HCl (GLUCOPHAGE PO) Take 1,000 mg by mouth 2 times daily (with meals).      insulin glargine (LANTUS SOLOSTAR) 100 UNIT/ML injection Inject 30 Units Subcutaneous At Bedtime       !! Insulin Pen Needle (PEN NEEDLES 5/16\") 31G X 8 MM MISC       liraglutide (VICTOZA) 18 MG/3ML SOLN Inject 1.8 mg Subcutaneous daily       SIMVASTATIN PO Take 40 mg by mouth daily.      IBUPROFEN PO       multivitamin, therapeutic with minerals (THERA-VIT-M) TABS Take 1 tablet by mouth daily.      calcium-vitamin D (CALTRATE) 600-400 MG-UNIT per tablet Take 1 tablet by mouth daily.      Blood Glucose Monitoring Suppl (ONE TOUCH ULTRA) 1 each strip TID AC       !! - Potential duplicate medications found. Please discuss with provider.      STOP taking these medications       amoxicillin-clavulanate (AUGMENTIN) 875-125 MG per tablet Comments:   Reason for Stopping:             Allergies   No Known Allergies  Data   Most Recent 3 CBC's:  Recent Labs   Lab Test  08/20/17   0810  08/19/17   0608  08/18/17   1829   WBC  8.6  12.3*  13.5*   HGB  12.1  11.3*  11.6*   MCV  91  90  88   PLT  213  169  203      Most Recent 3 BMP's:  Recent Labs   Lab Test  08/20/17   0810  08/20/17   0521  08/19/17   0608  08/18/17   1829   NA  140   --   134  133   POTASSIUM  3.1*   --   4.5  3.4   CHLORIDE  106   --   105  100   CO2  25   --   19*  25   BUN  8   --   10  10   CR  0.57  0.58  0.69  0.76   ANIONGAP  9   --   10  8   EDWARDO  9.1   --   8.0*  8.7   GLC  165*   --   234*  229*     Most Recent 2 LFT's:  Recent Labs   Lab Test  08/18/17   1829  07/24/16   0520   AST  20  33   ALT  27  29   ALKPHOS  108  70   BILITOTAL  0.5  0.4     Most Recent INR's and Anticoagulation Dosing History:  Anticoagulation Dose History     There is no flowsheet data to display.        Most Recent 3 Troponin's:No lab results found.  Most " Recent Cholesterol Panel:  Recent Labs   Lab Test  02/16/16   0912   CHOL  167   LDL  67   HDL  35*   TRIG  323*     Most Recent 6 Bacteria Isolates From Any Culture (See EPIC Reports for Culture Details):  Recent Labs   Lab Test  08/18/17 1953 08/18/17   1950  07/21/16   2204  07/21/16   1912  07/21/16   1849  10/31/13   1335   CULT  No growth after 4 days  Canceled, Test credited  Test canceled by physician  No growth  No growth after 6 days  No growth after 6 days  No growth after 6 days     Most Recent TSH, T4 and A1c Labs:  Recent Labs   Lab Test 02/10/17   02/16/16   0912   TSH   --    --   6.84*   A1C  7.9*   < >   --     < > = values in this interval not displayed.     Results for orders placed or performed in visit on 03/13/17   US Breast Right    Narrative    RIGHT BREAST ULTRASOUND    FINDINGS:  Ultrasound of the right breast reveals what appears to be a  dilated duct with some nodularity is seen at the 7 o'clock position, 4  cm from the nipple.  This has a similar appearance to the mammogram  and I would recommend an ultrasound-guided biopsy of this lesion.  I  would confirm that this is in fact the lesion on post-biopsy  mammograms.    IMPRESSION:  FURTHER BIOPSY WITH ULTRASOUND OF THE RIGHT BREAST IS  RECOMMENDED.    BI-RADS CATEGORY:  4 - Suspicious Abnormality - Biopsy Should Be  Considered.    Exam Date: Mar 13, 2017 02:28:00 PM  Author: RAMON JEAN BAPTISTE  This report is final and signed

## 2017-08-20 NOTE — PLAN OF CARE
Patient discharged at 10:45 AM via wheel chair accompanied by spouse and staff. Prescriptions sent to patients preferred pharmacy. All belongings sent with patient.     Discharge instructions reviewed with patient & spouse. Listed belongings gathered and returned to patient. Purse, phone, , clothes    Patient discharged to home.   Report called to N/A    Core Measures and Vaccines  Core Measures applicable during stay: No. If yes, state diagnosis: N/A  Pneumonia and Influenza given prior to discharge, if indicated: N/A    Surgical Patient   Surgical Procedures during stay: NO  Did patient receive discharge instruction on wound care and recognition of infection symptoms? Yes    MISC  Follow up appointment made:  Yes  Home and hospital aquired medications returned to patient: Yes  Patient reports pain was well managed at discharge: Yes

## 2017-08-20 NOTE — PLAN OF CARE
Face to face report given with opportunity to observe patient.    Report given to isidra cisneros   8/19/2017  11:08 PM

## 2017-08-21 ENCOUNTER — TELEPHONE (OUTPATIENT)
Dept: CASE MANAGEMENT | Facility: HOSPITAL | Age: 57
End: 2017-08-21

## 2017-08-22 ENCOUNTER — TELEPHONE (OUTPATIENT)
Dept: CASE MANAGEMENT | Facility: HOSPITAL | Age: 57
End: 2017-08-22

## 2017-08-24 LAB
BACTERIA SPEC CULT: NORMAL
Lab: NORMAL
SPECIMEN SOURCE: NORMAL

## 2017-08-28 ENCOUNTER — OFFICE VISIT (OUTPATIENT)
Dept: FAMILY MEDICINE | Facility: OTHER | Age: 57
End: 2017-08-28
Attending: FAMILY MEDICINE
Payer: COMMERCIAL

## 2017-08-28 VITALS
DIASTOLIC BLOOD PRESSURE: 76 MMHG | HEIGHT: 62 IN | TEMPERATURE: 98.9 F | BODY MASS INDEX: 34.23 KG/M2 | RESPIRATION RATE: 16 BRPM | WEIGHT: 186 LBS | HEART RATE: 70 BPM | OXYGEN SATURATION: 98 % | SYSTOLIC BLOOD PRESSURE: 122 MMHG

## 2017-08-28 DIAGNOSIS — Q82.0 HEREDITARY EDEMA OF LEGS: Primary | ICD-10-CM

## 2017-08-28 PROCEDURE — 99213 OFFICE O/P EST LOW 20 MIN: CPT | Performed by: FAMILY MEDICINE

## 2017-08-28 RX ORDER — SULFAMETHOXAZOLE/TRIMETHOPRIM 800-160 MG
1 TABLET ORAL 2 TIMES DAILY
Qty: 20 TABLET | Refills: 0 | Status: SHIPPED | OUTPATIENT
Start: 2017-08-28 | End: 2018-04-17

## 2017-08-28 ASSESSMENT — ANXIETY QUESTIONNAIRES
GAD7 TOTAL SCORE: 1
6. BECOMING EASILY ANNOYED OR IRRITABLE: SEVERAL DAYS
2. NOT BEING ABLE TO STOP OR CONTROL WORRYING: NOT AT ALL
1. FEELING NERVOUS, ANXIOUS, OR ON EDGE: NOT AT ALL
3. WORRYING TOO MUCH ABOUT DIFFERENT THINGS: NOT AT ALL
7. FEELING AFRAID AS IF SOMETHING AWFUL MIGHT HAPPEN: NOT AT ALL
5. BEING SO RESTLESS THAT IT IS HARD TO SIT STILL: NOT AT ALL

## 2017-08-28 ASSESSMENT — PAIN SCALES - GENERAL: PAINLEVEL: NO PAIN (0)

## 2017-08-28 ASSESSMENT — PATIENT HEALTH QUESTIONNAIRE - PHQ9
SUM OF ALL RESPONSES TO PHQ QUESTIONS 1-9: 3
5. POOR APPETITE OR OVEREATING: NOT AT ALL

## 2017-08-28 NOTE — PROGRESS NOTES
Suzy Gallardo    August 28, 2017    Chief Complaint   Patient presents with     Hospital F/U     Follow up from ER for cellulitis in lower extremities. Stayed for 2 days. States she is feeling much better. Not having any continuing issues.        SUBJECTIVE:  Here for f/u.  Doing well.  Needs refills of abx on hand.  Doing well now and no further issues.  Dm cares stable and through endocrine.      Past Medical History:   Diagnosis Date     Anemia, unspecified 06/23/2004    Lali Monroe MD      Cellulitis and abscess of hand, except fingers and thumb 01/11/2002     Cellulitis and abscess of unspecified site 02/22/2002    Jaime Rodarte MD      Depressive disorder, not elsewhere classified 01/19/2004     Diabetes mellitus (H)      Diabetes mellitus without mention of complication, 11/30/2001     Endometriosis of uterus 12/19/2001     Follow-up examination, following unspecified surgery 10/02/2001     Ingrowing nail 03/21/2000     Metrorrhagia 07/08/2004     [place] disease  03/01/2012    Nathanael Mena MD      Need for prophylactic vaccination and inoculation against influenza 11/06/2003     Other (abnormal) findings on radiological examination of breast 08/15/2002    Alfonso Patton MD      Other screening mammogram 07/25/2000     Pure hypercholesterolemia 11/26/1999    Cristian Bradley MD      Pure hyperglyceridemia 01/11/2000    Cristina Bradley MD      Routine general medical examination at a health care facility 04/07/2003     Sterilization 12/04/2001     Unspecified symptom associated with female genital organs 10/30/2001     Vaginitis and vulvovaginitis, unspecified 11/21/2001       Past Surgical History:   Procedure Laterality Date     BREAST SURGERY      breast biopsy     COLONOSCOPY  5/3/2013    Procedure: COLONOSCOPY;  COLONOSCOPY;  Surgeon: Pablito Urbina MD;  Location: [facility]     ENDOMETRIAL SAMPLING (BIOPSY)       HYSTERECTOMY       LAPAROSCOPY       NOSE SURGERY       wisdom teeth removed    "      Current Outpatient Prescriptions   Medication Sig Dispense Refill     amoxicillin-clavulanate (AUGMENTIN) 875-125 MG per tablet Take 1 tablet by mouth 2 times daily 20 tablet 0     sulfamethoxazole-trimethoprim (BACTRIM DS/SEPTRA DS) 800-160 MG per tablet Take 1 tablet by mouth 2 times daily 20 tablet 0     venlafaxine (EFFEXOR-XR) 75 MG 24 hr capsule TAKE TWO CAPSULES BY MOUTH DAILY 180 capsule 0     IBUPROFEN PO        furosemide (LASIX) 40 MG tablet Take 1 tablet (40 mg) by mouth 2 times daily 180 tablet 2     ASPIRIN PO Take 81 mg by mouth daily        multivitamin, therapeutic with minerals (THERA-VIT-M) TABS Take 1 tablet by mouth daily.       calcium-vitamin D (CALTRATE) 600-400 MG-UNIT per tablet Take 1 tablet by mouth daily.       Blood Glucose Monitoring Suppl (ONE TOUCH ULTRA) 1 each strip TID AC       Insulin Pen Needle (BD U/F III SHORT PEN NEEDLE) See instructions.       MetFORMIN HCl (GLUCOPHAGE PO) Take 1,000 mg by mouth 2 times daily (with meals).       insulin glargine (LANTUS SOLOSTAR) 100 UNIT/ML injection Inject 30 Units Subcutaneous At Bedtime        Insulin Pen Needle (PEN NEEDLES 5/16\") 31G X 8 MM MISC        liraglutide (VICTOZA) 18 MG/3ML SOLN Inject 1.8 mg Subcutaneous daily        SIMVASTATIN PO Take 40 mg by mouth daily.         No Known Allergies    Family History   Problem Relation Age of Onset     C.A.D. 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     OBJECTIVE:  B/P: 122/76, T: 98.9, P: 70, R: 16    GENERAL APPEARANCE: Alert, no acute distress  CV: regular rate and rhythm, no murmur, rub or gallop  RESP: lungs clear to auscultation bilaterally  ABDOMEN: normal bowel sounds, soft, nontender, no hepatosplenomegaly or other masses  SKIN: no suspicious lesions or rashes to visualized skin  NEURO: Alert, oriented x 3, speech and mentation normal    ASSESSMENT and PLAN:  (Q82.0) [place] disease   (primary encounter diagnosis)  Comment: with recurrent cellulitis.   Plan: amoxicillin-clavulanate (AUGMENTIN) 875-125 MG         per tablet, sulfamethoxazole-trimethoprim         (BACTRIM DS/SEPTRA DS) 800-160 MG per tablet        Resolved infection.  Observe for now and new abx to keep on hand.  She will use promptly.  F/u with ongoing concerns and keep on the dm cares with endocrine.

## 2017-08-28 NOTE — NURSING NOTE
"Chief Complaint   Patient presents with     Hospital F/U     Follow up from ER for cellulitis in lower extremities. Stayed for 2 days. States she is feeling much better. Not having any continuing issues.        Initial /76 (BP Location: Left arm, Patient Position: Chair, Cuff Size: Adult Regular)  Pulse 70  Temp 98.9  F (37.2  C) (Tympanic)  Resp 16  Ht 5' 2\" (1.575 m)  Wt 186 lb (84.4 kg)  SpO2 98%  BMI 34.02 kg/m2 Estimated body mass index is 34.02 kg/(m^2) as calculated from the following:    Height as of this encounter: 5' 2\" (1.575 m).    Weight as of this encounter: 186 lb (84.4 kg).  Medication Reconciliation: complete   Ingrdi Mendez LPN    "

## 2017-08-28 NOTE — MR AVS SNAPSHOT
After Visit Summary   8/28/2017    Suzy Gallardo    MRN: [id number]           Patient Information     Date Of Birth          1960        Visit Information        Provider Department      8/28/2017 9:30 AM Nathanael Mena MD [facility]        Today's Diagnoses     [place] disease     -  1      Care Instructions    F/u with ongoing concerns.           Follow-ups after your visit        Your next 10 appointments already scheduled     Apr 17, 2018  9:00 AM CDT   MAMMOGRAM with  [facility]MOGRAM [facility]    [address] [address]   [phone]           Do not wear any body powder, lotions, deodorant or perfume the day of the exam. Bring a list of all medications, especially hormones.  If your last mammogram was not done at [facility], please bring your mammogram films. We will need the name of your MD/PA to send a copy of your report.            Apr 17, 2018 10:00 AM CDT   (Arrive by 9:45 AM)   PHYSICAL with Lali Monroe MD   [facility]    [address]  [place] [address]   [phone]              Who to contact     If you have questions or need follow up information about today's clinic visit or your schedule please contact [facility] directly at [phone].  Normal or non-critical lab and imaging results will be communicated to you by MyChart, letter or phone within 4 business days after the clinic has received the results. If you do not hear from us within 7 days, please contact the clinic through MyChart or phone. If you have a critical or abnormal lab result, we will notify you by phone as soon as possible.  Submit refill requests through [facility] or call your pharmacy and they will forward the refill request to us. Please allow 3 business days for your refill to be completed.          Additional Information About Your Visit        [facility]  "Information     [facility] gives you secure access to your electronic health record. If you see a primary care provider, you can also send messages to your care team and make appointments. If you have questions, please call your primary care clinic.  If you do not have a primary care provider, please call [phone] and they will assist you.        Care EveryWhere ID     This is your Care EveryWhere ID. This could be used by other organizations to access your [facility] [place] medical records  [id number]        Your Vitals Were     Pulse Temperature Respirations Height Pulse Oximetry BMI (Body Mass Index)    70 98.9  F (37.2  C) (Tympanic) 16 5' 2\" (1.575 m) 98% 34.02 kg/m2       Blood Pressure from Last 3 Encounters:   08/28/17 122/76   08/20/17 123/58   03/07/17 120/72    Weight from Last 3 Encounters:   08/28/17 186 lb (84.4 kg)   08/20/17 184 lb 8.4 oz (83.7 kg)   03/07/17 189 lb (85.7 kg)              Today, you had the following     No orders found for display         Today's Medication Changes          These changes are accurate as of: 8/28/17 10:12 AM.  If you have any questions, ask your nurse or doctor.               Start taking these medicines.        Dose/Directions    amoxicillin-clavulanate 875-125 MG per tablet   Commonly known as:  AUGMENTIN   Used for:  Hereditary edema of legs   Started by:  Nathanael Mena MD        Dose:  1 tablet   Take 1 tablet by mouth 2 times daily   Quantity:  20 tablet   Refills:  0       sulfamethoxazole-trimethoprim 800-160 MG per tablet   Commonly known as:  BACTRIM DS/SEPTRA DS   Used for:  Hereditary edema of legs   Started by:  Nathanael Mena MD        Dose:  1 tablet   Take 1 tablet by mouth 2 times daily   Quantity:  20 tablet   Refills:  0            Where to get your medicines      These medications were sent to [place] [address] [place] [address] [address]     Phone:  [phone]     amoxicillin-clavulanate " "875-125 MG per tablet    sulfamethoxazole-trimethoprim 800-160 MG per tablet                Primary Care Provider Office Phone # Fax #    Nathanael Mena [phone]       [facility] [address]  [facility] [address]        Equal Access to Services     AVEL CHONG : Hadii aad ku hadasho Soomaali, waaxda luqadaha, qaybta kaalmada adeegyada, waxay idiin hayaan adedirk xochitl lamary fam. So [facility] [phone].    ATENCIÓN: Si habla español, tiene a banuelos disposición servicios gratuitos de asistencia lingüística. Tiffanie al [phone].    We comply with applicable federal civil rights laws and [place] laws. We do not discriminate on the basis of race, color, national origin, age, disability sex, sexual orientation or gender identity.            Thank you!     Thank you for choosing [facility]  for your care. Our goal is always to provide you with excellent care. Hearing back from our patients is one way we can continue to improve our services. Please take a few minutes to complete the written survey that you may receive in the mail after your visit with us. Thank you!             Your Updated Medication List - Protect others around you: Learn how to safely use, store and throw away your medicines at www.disposemymeds.org.          This list is accurate as of: 8/28/17 10:12 AM.  Always use your most recent med list.                   Brand Name Dispense Instructions for use Diagnosis    amoxicillin-clavulanate 875-125 MG per tablet    AUGMENTIN    20 tablet    Take 1 tablet by mouth 2 times daily    Hereditary edema of legs       ASPIRIN PO      Take 81 mg by mouth daily        * BD U/F III SHORT PEN NEEDLE      See instructions.        * pen needles 5/16\" 31G X 8 MM Misc           calcium-vitamin D 600-400 MG-UNIT per tablet    CALTRATE     Take 1 tablet by mouth daily.        furosemide 40 MG tablet    LASIX    180 tablet    Take 1 tablet (40 mg) by mouth 2 times daily    " Benign essential hypertension       GLUCOPHAGE PO      Take 1,000 mg by mouth 2 times daily (with meals).        IBUPROFEN PO           LANTUS SOLOSTAR 100 UNIT/ML injection   Generic drug:  insulin glargine      Inject 30 Units Subcutaneous At Bedtime        liraglutide 18 MG/3ML Soln    VICTOZA     Inject 1.8 mg Subcutaneous daily        multivitamin, therapeutic with minerals Tabs tablet      Take 1 tablet by mouth daily.        ONE TOUCH ULTRA      1 each strip TID AC        SIMVASTATIN PO      Take 40 mg by mouth daily.        sulfamethoxazole-trimethoprim 800-160 MG per tablet    BACTRIM DS/SEPTRA DS    20 tablet    Take 1 tablet by mouth 2 times daily    Hereditary edema of legs       venlafaxine 75 MG 24 hr capsule    EFFEXOR-XR    180 capsule    TAKE TWO CAPSULES BY MOUTH DAILY    Dysthymia       * Notice:  This list has 2 medication(s) that are the same as other medications prescribed for you. Read the directions carefully, and ask your doctor or other care provider to review them with you.

## 2017-08-29 ASSESSMENT — ANXIETY QUESTIONNAIRES: GAD7 TOTAL SCORE: 1

## 2017-09-16 DIAGNOSIS — F34.1 DYSTHYMIA: ICD-10-CM

## 2017-09-18 RX ORDER — VENLAFAXINE HYDROCHLORIDE 75 MG/1
CAPSULE, EXTENDED RELEASE ORAL
Qty: 180 CAPSULE | Refills: 0 | Status: SHIPPED | OUTPATIENT
Start: 2017-09-18 | End: 2017-12-23

## 2018-02-08 DIAGNOSIS — F34.1 DYSTHYMIA: ICD-10-CM

## 2018-02-09 RX ORDER — VENLAFAXINE HYDROCHLORIDE 75 MG/1
CAPSULE, EXTENDED RELEASE ORAL
Qty: 180 CAPSULE | Refills: 0 | Status: SHIPPED | OUTPATIENT
Start: 2018-02-09 | End: 2018-06-20

## 2018-02-19 ENCOUNTER — TRANSFERRED RECORDS (OUTPATIENT)
Dept: HEALTH INFORMATION MANAGEMENT | Facility: HOSPITAL | Age: 58
End: 2018-02-19

## 2018-02-19 LAB — HBA1C MFR BLD: 8.1 % (ref 4–6)

## 2018-03-17 ENCOUNTER — HEALTH MAINTENANCE LETTER (OUTPATIENT)
Age: 58
End: 2018-03-17

## 2018-04-17 ENCOUNTER — RADIANT APPOINTMENT (OUTPATIENT)
Dept: MAMMOGRAPHY | Facility: OTHER | Age: 58
End: 2018-04-17
Attending: OBSTETRICS & GYNECOLOGY
Payer: COMMERCIAL

## 2018-04-17 ENCOUNTER — OFFICE VISIT (OUTPATIENT)
Dept: OBGYN | Facility: OTHER | Age: 58
End: 2018-04-17
Attending: OBSTETRICS & GYNECOLOGY
Payer: COMMERCIAL

## 2018-04-17 VITALS
WEIGHT: 192 LBS | BODY MASS INDEX: 35.33 KG/M2 | HEART RATE: 76 BPM | HEIGHT: 62 IN | DIASTOLIC BLOOD PRESSURE: 82 MMHG | SYSTOLIC BLOOD PRESSURE: 128 MMHG

## 2018-04-17 DIAGNOSIS — Z00.00 ROUTINE GENERAL MEDICAL EXAMINATION AT A HEALTH CARE FACILITY: Primary | ICD-10-CM

## 2018-04-17 DIAGNOSIS — Z00.00 ROUTINE GENERAL MEDICAL EXAMINATION AT A HEALTH CARE FACILITY: ICD-10-CM

## 2018-04-17 DIAGNOSIS — R79.89 ELEVATED LIVER FUNCTION TESTS: Primary | ICD-10-CM

## 2018-04-17 DIAGNOSIS — Z12.11 SCREEN FOR COLON CANCER: ICD-10-CM

## 2018-04-17 LAB
ALBUMIN SERPL-MCNC: 3.9 G/DL (ref 3.4–5)
ALP SERPL-CCNC: 128 U/L (ref 40–150)
ALT SERPL W P-5'-P-CCNC: 62 U/L (ref 0–50)
AST SERPL W P-5'-P-CCNC: 82 U/L (ref 0–45)
BILIRUB DIRECT SERPL-MCNC: 0.1 MG/DL (ref 0–0.2)
BILIRUB SERPL-MCNC: 0.4 MG/DL (ref 0.2–1.3)
HEMOCCULT SP1 STL QL: NEGATIVE
PROT SERPL-MCNC: 7.7 G/DL (ref 6.8–8.8)

## 2018-04-17 PROCEDURE — 80076 HEPATIC FUNCTION PANEL: CPT | Performed by: OBSTETRICS & GYNECOLOGY

## 2018-04-17 PROCEDURE — 36415 COLL VENOUS BLD VENIPUNCTURE: CPT | Performed by: OBSTETRICS & GYNECOLOGY

## 2018-04-17 PROCEDURE — 77063 BREAST TOMOSYNTHESIS BI: CPT | Mod: TC

## 2018-04-17 PROCEDURE — 77067 SCR MAMMO BI INCL CAD: CPT | Mod: TC

## 2018-04-17 PROCEDURE — 82274 ASSAY TEST FOR BLOOD FECAL: CPT | Performed by: OBSTETRICS & GYNECOLOGY

## 2018-04-17 PROCEDURE — 99396 PREV VISIT EST AGE 40-64: CPT | Performed by: OBSTETRICS & GYNECOLOGY

## 2018-04-17 ASSESSMENT — ANXIETY QUESTIONNAIRES
5. BEING SO RESTLESS THAT IT IS HARD TO SIT STILL: NOT AT ALL
GAD7 TOTAL SCORE: 1
2. NOT BEING ABLE TO STOP OR CONTROL WORRYING: NOT AT ALL
IF YOU CHECKED OFF ANY PROBLEMS ON THIS QUESTIONNAIRE, HOW DIFFICULT HAVE THESE PROBLEMS MADE IT FOR YOU TO DO YOUR WORK, TAKE CARE OF THINGS AT HOME, OR GET ALONG WITH OTHER PEOPLE: NOT DIFFICULT AT ALL
6. BECOMING EASILY ANNOYED OR IRRITABLE: SEVERAL DAYS
1. FEELING NERVOUS, ANXIOUS, OR ON EDGE: NOT AT ALL
3. WORRYING TOO MUCH ABOUT DIFFERENT THINGS: NOT AT ALL
7. FEELING AFRAID AS IF SOMETHING AWFUL MIGHT HAPPEN: NOT AT ALL

## 2018-04-17 ASSESSMENT — PAIN SCALES - GENERAL: PAINLEVEL: NO PAIN (0)

## 2018-04-17 ASSESSMENT — PATIENT HEALTH QUESTIONNAIRE - PHQ9: 5. POOR APPETITE OR OVEREATING: NOT AT ALL

## 2018-04-17 NOTE — NURSING NOTE
"Chief Complaint   Patient presents with     Gyn Exam       Initial /82  Pulse 76  Ht 5' 2\" (1.575 m)  Wt 192 lb (87.1 kg)  BMI 35.12 kg/m2 Estimated body mass index is 35.12 kg/(m^2) as calculated from the following:    Height as of this encounter: 5' 2\" (1.575 m).    Weight as of this encounter: 192 lb (87.1 kg).  Medication Reconciliation: austen Jang      "

## 2018-04-17 NOTE — MR AVS SNAPSHOT
After Visit Summary   4/17/2018    Suzy Gallardo    MRN: [id number]           Patient Information     Date Of Birth          1960        Visit Information        Provider Department      4/17/2018 10:00 AM [facility]        Today's Diagnoses     Routine general medical examination at a health care facility    -  1    Screen for colon cancer          Care Instructions    Liver function tests in lab today.  IFOB take home test (screen for blood in stool) with instructions included given.  Colonoscopy due at age 62.  Return for annual exam in 1 year with Dr. Marjyane Lyon.    Due for TD vaccine 12/2019          Follow-ups after your visit        Future tests that were ordered for you today     Open Future Orders        Priority Expected Expires Ordered    Immunos occult blood Routine 4/18/2018 4/17/2019 4/17/2018            Who to contact     If you have questions or need follow up information about today's clinic visit or your schedule please contact [facility] directly at [phone].  Normal or non-critical lab and imaging results will be communicated to you by Gevohart, letter or phone within 4 business days after the clinic has received the results. If you do not hear from us within 7 days, please contact the clinic through Emgot or phone. If you have a critical or abnormal lab result, we will notify you by phone as soon as possible.  Submit refill requests through [employer]PSoftware or call your pharmacy and they will forward the refill request to us. Please allow 3 business days for your refill to be completed.          Additional Information About Your Visit        MyChart Information     [employer]Software gives you secure access to your electronic health record. If you see a primary care provider, you can also send messages to your care team and make appointments. If you have questions, please call your primary care clinic.  If you do not have a primary care  "provider, please call [phone] and they will assist you.        Care EveryWhere ID     This is your Care EveryWhere ID. This could be used by other organizations to access your [place] medical records  [id number]        Your Vitals Were     Pulse Height BMI (Body Mass Index)             76 5' 2\" (1.575 m) 35.12 kg/m2          Blood Pressure from Last 3 Encounters:   04/17/18 128/82   08/28/17 122/76   08/20/17 123/58    Weight from Last 3 Encounters:   04/17/18 192 lb (87.1 kg)   08/28/17 186 lb (84.4 kg)   08/20/17 184 lb 8.4 oz (83.7 kg)              We Performed the Following     Hepatic panel (Albumin, ALT, AST, Bili, Alk Phos, TP)     Immunos occult blood        Primary Care Provider Office Phone # Fax #    Nathanael Mena [phone]       [address] [place] [address]        Equal Access to Services     [facility] : Hadii aad ku hadasho Soomaali, waaxda luqadaha, qaybta kaalmada adeegyada, waxay idiin hayaan adeeg kharaseun lamary . So [facility] [phone].    ATENCIÓN: Si habla español, tiene a banuelos disposición servicios gratuitos de asistencia lingüística. [facility] [phone].    We comply with applicable federal civil rights laws and [place] laws. We do not discriminate on the basis of race, color, national origin, age, disability, sex, sexual orientation, or gender identity.            Thank you!     Thank you for choosing [facility] [place]  for your care. Our goal is always to provide you with excellent care. Hearing back from our patients is one way we can continue to improve our services. Please take a few minutes to complete the written survey that you may receive in the mail after your visit with us. Thank you!             Your Updated Medication List - Protect others around you: Learn how to safely use, store and throw away your medicines at www.disposemymeds.org.          This list is accurate as of 4/17/18 10:53 AM.  Always use your most recent med list.    " "               Brand Name Dispense Instructions for use Diagnosis    ASPIRIN PO      Take 81 mg by mouth daily        * BD U/F III SHORT PEN NEEDLE      See instructions.        * pen needles 5/16\" 31G X 8 MM Misc           calcium-vitamin D 600-400 MG-UNIT per tablet    CALTRATE     Take 1 tablet by mouth daily.        furosemide 40 MG tablet    LASIX    180 tablet    Take 1 tablet (40 mg) by mouth 2 times daily    Benign essential hypertension       GLUCOPHAGE PO      Take 1,000 mg by mouth 2 times daily (with meals).        IBUPROFEN PO           LANTUS SOLOSTAR 100 UNIT/ML injection   Generic drug:  insulin glargine      Inject 30 Units Subcutaneous At Bedtime        liraglutide 18 MG/3ML Soln    VICTOZA     Inject 1.8 mg Subcutaneous daily        multivitamin, therapeutic with minerals Tabs tablet      Take 1 tablet by mouth daily.        ONE TOUCH ULTRA      1 each strip TID AC        SIMVASTATIN PO      Take 40 mg by mouth daily.        venlafaxine 75 MG 24 hr capsule    EFFEXOR-XR    180 capsule    TAKE TWO CAPSULES BY MOUTH DAILY    Dysthymia       * Notice:  This list has 2 medication(s) that are the same as other medications prescribed for you. Read the directions carefully, and ask your doctor or other care provider to review them with you.      "

## 2018-04-17 NOTE — PROGRESS NOTES
ANNUAL    Suzy is a 57 year old  G P2 female who presents for annual exam.   Postmenopausal since 7 years.  She is having min hot flashes. No vaginal bleeding noted.     Concerns other than routine annual and health maintenance:  Liquid bowel movements.  GYNECOLOGIC HISTORY:    She is sexually active  Problems with sex: n  Safe: y   Wanting std testing? n  Estrogen replacement therapy:  n  History of abnormal Pap smear: n  Family history of breast cancer n, ovarian cancer n, uterine cancer n, colon cancer:  n       HEALTH MAINTENANCE:  Cholesterol: (  Cholesterol   Date Value Ref Range Status   2017 147 <200 mg/dL Final   2016 167 <200 mg/dL Final    History of abnormal lipids: y   Last Mammo: today . History of abnormal Mammo: y   Regular Self Breast Exams: y  Last Colonoscopy:  History of abnormal Colonoscopy n  Calcium or vitamins; n   Exercise: n     TSH: (  TSH   Date Value Ref Range Status   2017 2.750 0.350 - 4.800 mIU/ml Final    )  Pap: (No results found for: PAP )    HISTORY:  Obstetric History       T2      L2     SAB1   TAB0   Ectopic0   Multiple0   Live Births2       # Outcome Date GA Lbr Apolinar/2nd Weight Sex Delivery Anes PTL Lv   3 SAB  6w0d    SAB      2 Term     F Vag-Spont   ERON   1 Term     M Vag-Spont   ERON        Past Medical History:   Diagnosis Date     Anemia, unspecified 2004    Lali Monroe MD      Cellulitis and abscess of hand, except fingers and thumb 2002     Cellulitis and abscess of unspecified site 2002    Jaime Rodarte MD      Depressive disorder, not elsewhere classified 2004     Diabetes mellitus (H)      Diabetes mellitus without mention of complication, 2001     Endometriosis of uterus 2001     Follow-up examination, following unspecified surgery 10/02/2001     Ingrowing nail 2000     Metrorrhagia 2004     [place] disease  2012    Nathanael Mena MD      Need for prophylactic  vaccination and inoculation against influenza 11/06/2003     Other (abnormal) findings on radiological examination of breast 08/15/2002    Alfonso Patton MD      Other screening mammogram 07/25/2000     Pure hypercholesterolemia 11/26/1999    Cristian Bradley MD      Pure hyperglyceridemia 01/11/2000    Cristian Bradley MD      Routine general medical examination at a health care facility 04/07/2003     Sterilization 12/04/2001     Unspecified symptom associated with female genital organs 10/30/2001     Vaginitis and vulvovaginitis, unspecified 11/21/2001     Past Surgical History:   Procedure Laterality Date     BREAST SURGERY      breast biopsy     COLONOSCOPY  5/3/2013    Procedure: COLONOSCOPY;  COLONOSCOPY;  Surgeon: Pablito Urbina MD;  Location: [facility]     ENDOMETRIAL SAMPLING (BIOPSY)       HYSTERECTOMY       LAPAROSCOPY       NOSE SURGERY       wisdom teeth removed       Family History   Problem Relation Age of Onset     C.A.D. Father      CANCER Brother      gastric (cause of death)      Hypertension Other      Social History     Social History     Marital status:      Spouse name: N/A     Number of children: N/A     Years of education: N/A     Occupational History     Teacher [employer] [employer]     Social History Main Topics     Smoking status: Never Smoker     Smokeless tobacco: Never Used     Alcohol use No     Drug use: No     Sexual activity: Not Asked     Other Topics Concern     Blood Transfusions Yes     Caffeine Concern No     Social History Narrative       Current Outpatient Prescriptions:      venlafaxine (EFFEXOR-XR) 75 MG 24 hr capsule, TAKE TWO CAPSULES BY MOUTH DAILY, Disp: 180 capsule, Rfl: 0     IBUPROFEN PO, , Disp: , Rfl:      furosemide (LASIX) 40 MG tablet, Take 1 tablet (40 mg) by mouth 2 times daily, Disp: 180 tablet, Rfl: 2     ASPIRIN PO, Take 81 mg by mouth daily , Disp: , Rfl:      multivitamin, therapeutic with minerals (THERA-VIT-M) TABS, Take 1 tablet by mouth daily.,  "Disp: , Rfl:      calcium-vitamin D (CALTRATE) 600-400 MG-UNIT per tablet, Take 1 tablet by mouth daily., Disp: , Rfl:      Blood Glucose Monitoring Suppl (ONE TOUCH ULTRA), 1 each strip TID AC, Disp: , Rfl:      Insulin Pen Needle (BD U/F III SHORT PEN NEEDLE), See instructions., Disp: , Rfl:      MetFORMIN HCl (GLUCOPHAGE PO), Take 1,000 mg by mouth 2 times daily (with meals)., Disp: , Rfl:      insulin glargine (LANTUS SOLOSTAR) 100 UNIT/ML injection, Inject 30 Units Subcutaneous At Bedtime , Disp: , Rfl:      Insulin Pen Needle (PEN NEEDLES 5/16\") 31G X 8 MM MISC, , Disp: , Rfl:      liraglutide (VICTOZA) 18 MG/3ML SOLN, Inject 1.8 mg Subcutaneous daily , Disp: , Rfl:      SIMVASTATIN PO, Take 40 mg by mouth daily., Disp: , Rfl:    No Known Allergies    Past medical, surgical, social and family history were reviewed and updated in [facility].     ROS:   CONSTITUTIONAL:       NEGATIVE for fever, chills, change in weight  INTEGUMENTARY/SKIN:         NEGATIVE for worrisome rashes, moles or lesions  EYES:       NEGATIVE for vision changes or irritation  ENT/MOUTH:   NEGATIVE for ear, mouth and throat problems  RESP:       NEGATIVE for significant cough or SOB  CV:     NEGATIVE for chest pain, palpitations or peripheral edema  GI:      NEGATIVE for nausea, abdominal pain, heartburn, or change in bowel habits  :    NEGATIVE for frequency, dysuria, hematuria, vaginal discharge, or bleeding, incontinence   MUSCULOSKELETAL:       NEGATIVE for significant arthralgias or myalgia  NEURO:       NEGATIVE for weakness, dizziness or paresthesias  ENDORCRINE:       NEGATIVE for temperature intolerance, skin/hair changes  PSYCHIATRIC:       NEGATIVE for changes in mood or affect     EXAM:  /82  Pulse 76  Ht 5' 2\" (1.575 m)  Wt 192 lb (87.1 kg)  BMI 35.12 kg/m2   BMI: Body mass index is 35.12 kg/(m^2).  Constitutional: healthy, alert and no distress  Head: Normocephalic. No masses, lesions, tenderness or abnormalities  Neck: " Neck supple. Trachea midline. No adenopathy. Thyroid symmetric, normal size.   Cardiovascular: RRR.   Respiratory: lungs clear  Breast: Breasts reveal mild symmetric fibrocystic densities, but there are no dominant, discrete, fixed or suspicious masses found.   Gastrointestinal: Abdomen soft, non-tender, non-distended. No masses, organomegaly  Pelvic:  Vulva:  No external lesions, normal female hair distribution, no inguinal adenopathy.    Urethra:  Midline, non-tender, well supported, no discharge  Vagina:  Atrophic, no abnormal discharge, no lesions  Uterus: absent  Cervix: absent  Ovaries:  No masses appreciated, non-tender, mobile  Rectal Exam: neg for heme or mass    Musculoskeletal: extremities normal  Skin: no suspicious lesions or rashes  Psychiatric: Affect appropriate, cooperative,mentation appears normal.     COUNSELING:     regular exercise   healthy diet/nutrition   Immunizations:   Folic Acid Counseling  Osteoporosis Prevention/Bone Health   reports that she has never smoked. She has never used smokeless tobacco.  Tobacco Cessation Action Plan: na  Body mass index is 35.12 kg/(m^2).  Weight management plan: Current exercise routine: working on it. Diet regimen was discussed and plan is lo glycemic.     FRAX Risk Assessment  ASSESSMENT:  57 year old  with satisfactory annual exam  diarrhea    PLAN:  ifob  lft's  Other labs reviewed and will be done by Carlos Mena  Mammogram done, Check [facility]IC  Colonoscopy when 62  Return to office: 1 yr Maryjane Lyon      Greater than  0 minutes were spent on issues other than annual          Lali Monroe MD

## 2018-04-17 NOTE — PATIENT INSTRUCTIONS
Liver function tests in lab today.  IFOB take home test (screen for blood in stool) with instructions included given.  Colonoscopy due at age 62.  Return for annual exam in 1 year with Dr. Maryjane Lyon.    Due for TD vaccine 12/2019

## 2018-04-18 ASSESSMENT — PATIENT HEALTH QUESTIONNAIRE - PHQ9: SUM OF ALL RESPONSES TO PHQ QUESTIONS 1-9: 1

## 2018-04-18 ASSESSMENT — ANXIETY QUESTIONNAIRES: GAD7 TOTAL SCORE: 1

## 2018-04-19 DIAGNOSIS — Z00.00 ROUTINE GENERAL MEDICAL EXAMINATION AT A HEALTH CARE FACILITY: ICD-10-CM

## 2018-04-19 DIAGNOSIS — Z12.11 SCREEN FOR COLON CANCER: ICD-10-CM

## 2018-04-19 LAB — HEMOCCULT SP1 STL QL: NEGATIVE

## 2018-04-19 PROCEDURE — 82274 ASSAY TEST FOR BLOOD FECAL: CPT | Performed by: OBSTETRICS & GYNECOLOGY

## 2018-04-22 ENCOUNTER — HEALTH MAINTENANCE LETTER (OUTPATIENT)
Age: 58
End: 2018-04-22

## 2018-04-25 DIAGNOSIS — R79.89 ELEVATED LIVER FUNCTION TESTS: ICD-10-CM

## 2018-04-25 LAB
ALBUMIN SERPL-MCNC: 4.6 G/DL (ref 3.4–5)
ALP SERPL-CCNC: 117 U/L (ref 40–150)
ALT SERPL W P-5'-P-CCNC: 72 U/L (ref 0–50)
AST SERPL W P-5'-P-CCNC: 77 U/L (ref 0–45)
BILIRUB DIRECT SERPL-MCNC: 0.2 MG/DL (ref 0–0.2)
BILIRUB SERPL-MCNC: 0.6 MG/DL (ref 0.2–1.3)
PROT SERPL-MCNC: 8.5 G/DL (ref 6.8–8.8)

## 2018-04-25 PROCEDURE — 36415 COLL VENOUS BLD VENIPUNCTURE: CPT | Performed by: OBSTETRICS & GYNECOLOGY

## 2018-04-25 PROCEDURE — 80076 HEPATIC FUNCTION PANEL: CPT | Performed by: OBSTETRICS & GYNECOLOGY

## 2018-05-01 ENCOUNTER — OFFICE VISIT (OUTPATIENT)
Dept: FAMILY MEDICINE | Facility: OTHER | Age: 58
End: 2018-05-01
Attending: FAMILY MEDICINE
Payer: COMMERCIAL

## 2018-05-01 VITALS
DIASTOLIC BLOOD PRESSURE: 74 MMHG | BODY MASS INDEX: 33.49 KG/M2 | WEIGHT: 182 LBS | TEMPERATURE: 98.9 F | OXYGEN SATURATION: 95 % | SYSTOLIC BLOOD PRESSURE: 134 MMHG | HEART RATE: 71 BPM | HEIGHT: 62 IN

## 2018-05-01 DIAGNOSIS — R79.89 ELEVATED LFTS: Primary | ICD-10-CM

## 2018-05-01 LAB — INR PPP: 1.03 (ref 0.8–1.2)

## 2018-05-01 PROCEDURE — 85610 PROTHROMBIN TIME: CPT | Performed by: FAMILY MEDICINE

## 2018-05-01 PROCEDURE — 99000 SPECIMEN HANDLING OFFICE-LAB: CPT | Performed by: FAMILY MEDICINE

## 2018-05-01 PROCEDURE — 36415 COLL VENOUS BLD VENIPUNCTURE: CPT | Performed by: FAMILY MEDICINE

## 2018-05-01 PROCEDURE — 86803 HEPATITIS C AB TEST: CPT | Mod: 90 | Performed by: FAMILY MEDICINE

## 2018-05-01 PROCEDURE — 99213 OFFICE O/P EST LOW 20 MIN: CPT | Performed by: FAMILY MEDICINE

## 2018-05-01 ASSESSMENT — ANXIETY QUESTIONNAIRES
3. WORRYING TOO MUCH ABOUT DIFFERENT THINGS: NOT AT ALL
GAD7 TOTAL SCORE: 0
6. BECOMING EASILY ANNOYED OR IRRITABLE: NOT AT ALL
7. FEELING AFRAID AS IF SOMETHING AWFUL MIGHT HAPPEN: NOT AT ALL
2. NOT BEING ABLE TO STOP OR CONTROL WORRYING: NOT AT ALL
5. BEING SO RESTLESS THAT IT IS HARD TO SIT STILL: NOT AT ALL
1. FEELING NERVOUS, ANXIOUS, OR ON EDGE: NOT AT ALL

## 2018-05-01 ASSESSMENT — PATIENT HEALTH QUESTIONNAIRE - PHQ9: 5. POOR APPETITE OR OVEREATING: NOT AT ALL

## 2018-05-01 ASSESSMENT — PAIN SCALES - GENERAL: PAINLEVEL: NO PAIN (0)

## 2018-05-01 NOTE — PROGRESS NOTES
SUBJECTIVE:                                                    Suzy Gallardo is a 57 year old female who presents to clinic today for the following health issues:      Elevated liver enzymes      Duration: yesterday    Description (location/character/radiation): liver    Intensity:  mild    Accompanying signs and symptoms: none    History (similar episodes/previous evaluation): None    Precipitating or alleviating factors: None    Therapies tried and outcome: None       PROBLEMS TO ADD ON...    Problem list and histories reviewed & adjusted, as indicated.  Additional history: as documented    Patient Active Problem List   Diagnosis     [place] disease      Cellulitis     Annual physical exam     Morbid obesity (H)     Disorder of lipoid metabolism     Status post hysterectomy     ACP (advance care planning)     Type 2 diabetes mellitus without complication (H)     Depression     Hyperlipidemia     Elevated liver function tests     Past Surgical History:   Procedure Laterality Date     BREAST SURGERY      breast biopsy     COLONOSCOPY  5/3/2013    Procedure: COLONOSCOPY;  COLONOSCOPY;  Surgeon: Pablito Urbina MD;  Location: [facility]     ENDOMETRIAL SAMPLING (BIOPSY)       HYSTERECTOMY       LAPAROSCOPY       NOSE SURGERY       wisdom teeth removed         Social History   Substance Use Topics     Smoking status: Never Smoker     Smokeless tobacco: Never Used     Alcohol use No     Family History   Problem Relation Age of Onset     C.A.D. Father      CANCER Brother      gastric (cause of death)      Hypertension Other          Current Outpatient Prescriptions   Medication Sig Dispense Refill     ASPIRIN PO Take 81 mg by mouth daily        Blood Glucose Monitoring Suppl (ONE TOUCH ULTRA) 1 each strip TID AC       calcium-vitamin D (CALTRATE) 600-400 MG-UNIT per tablet Take 1 tablet by mouth daily.       furosemide (LASIX) 40 MG tablet Take 1 tablet (40 mg) by mouth 2 times daily 180 tablet 2     IBUPROFEN PO         "insulin glargine (LANTUS SOLOSTAR) 100 UNIT/ML injection Inject 30 Units Subcutaneous At Bedtime        Insulin Pen Needle (BD U/F III SHORT PEN NEEDLE) See instructions.       Insulin Pen Needle (PEN NEEDLES 5/16\") 31G X 8 MM MISC        liraglutide (VICTOZA) 18 MG/3ML SOLN Inject 1.8 mg Subcutaneous daily        MetFORMIN HCl (GLUCOPHAGE PO) Take 1,000 mg by mouth 2 times daily (with meals).       multivitamin, therapeutic with minerals (THERA-VIT-M) TABS Take 1 tablet by mouth daily.       SIMVASTATIN PO Take 40 mg by mouth daily.       venlafaxine (EFFEXOR-XR) 75 MG 24 hr capsule TAKE TWO CAPSULES BY MOUTH DAILY 180 capsule 0     No Known Allergies    ROS:  Constitutional, HEENT, cardiovascular, pulmonary, gi and gu systems are negative, except as otherwise noted.    OBJECTIVE:                                                    /74  Pulse 71  Temp 98.9  F (37.2  C)  Ht 5' 2\" (1.575 m)  Wt 182 lb (82.6 kg)  SpO2 95%  BMI 33.29 kg/m2  Body mass index is 33.29 kg/(m^2).  GENERAL APPEARANCE: Alert, no acute distress  CV: regular rate and rhythm, no murmur, rub or gallop  RESP: lungs clear to auscultation bilaterally  ABDOMEN: normal bowel sounds, soft, nontender, no hepatosplenomegaly or other masses  SKIN: no suspicious lesions or rashes to visualized skin  NEURO: Alert, oriented x 3, speech and mentation normal      INR normal.  Hep C pending.  US pending.      ASSESSMENT/PLAN:                                                    1. Elevated LFTs  Reviewed at length.  I told her I think it's probably fatty liver.  Going to get the us and the hep c.  F/u based on results.  She understands my thinking.    - Hepatitis C antibody  - INR point of care (  clinic ONLY)  - US Abdomen Limited; Future      Follow up with Provider - as indicated.      Nathanael Mena MD  [facility]      "

## 2018-05-01 NOTE — MR AVS SNAPSHOT
After Visit Summary   5/1/2018    Suzy Gallardo    MRN: [id number]           Patient Information     Date Of Birth          1960        Visit Information        Provider Department      5/1/2018 9:45 AM Nathanael Mena MD [facility]        Today's Diagnoses     Elevated LFTs    -  1      Care Instructions    F/u with ongoing concerns.           Follow-ups after your visit        Your next 10 appointments already scheduled     May 02, 2018  8:30 AM CDT   US ABDOMEN LIMITED with HIUS2   [facility] ([facility] )    [address] [facility] [address]   [phone]           Please bring a list of your medicines (including vitamins, minerals and over-the-counter drugs). Also, tell your doctor about any allergies you may have. Wear comfortable clothes and leave your valuables at home.  Adults: No eating or drinking for 8 hours before the exam. You may take medicine with a small sip of water.  Children: - Children 6+ years: No food or drink for 6 hours before exam. - Children 1-5 years: No food or drink for 4 hours before exam. - Infants, breast-fed: may have breast milk up to 2 hours before exam. - Infants, formula: may have bottle until 4 hours before exam.  Please call the Imaging Department at your exam site with any questions.            Apr 19, 2019  9:30 AM CDT   (Arrive by 9:15 AM)   PHYSICAL with Maryjane Lyon MD   [facility] ([facility] )    [address]  [facility] [address]   [phone]              Future tests that were ordered for you today     Open Future Orders        Priority Expected Expires Ordered    US Abdomen Limited Routine  5/1/2019 5/1/2018            Who to contact     If you have questions or need follow up information about today's clinic visit or your schedule please contact [facility] directly at [phone].  Normal or non-critical lab and imaging results will be communicated  "to you by TeamPageshart, letter or phone within 4 business days after the clinic has received the results. If you do not hear from us within 7 days, please contact the clinic through [employer] or phone. If you have a critical or abnormal lab result, we will notify you by phone as soon as possible.  Submit refill requests through [employer] or call your pharmacy and they will forward the refill request to us. Please allow 3 business days for your refill to be completed.          Additional Information About Your Visit        [employer] Information     [employer] gives you secure access to your electronic health record. If you see a primary care provider, you can also send messages to your care team and make appointments. If you have questions, please call your primary care clinic.  If you do not have a primary care provider, please call [phone] and they will assist you.        Care EveryWhere ID     This is your Care EveryWhere ID. This could be used by other organizations to access your [place] medical records  [id number]        Your Vitals Were     Pulse Temperature Height Pulse Oximetry BMI (Body Mass Index)       71 98.9  F (37.2  C) 5' 2\" (1.575 m) 95% 33.29 kg/m2        Blood Pressure from Last 3 Encounters:   05/01/18 134/74   04/17/18 128/82   08/28/17 122/76    Weight from Last 3 Encounters:   05/01/18 182 lb (82.6 kg)   04/17/18 192 lb (87.1 kg)   08/28/17 186 lb (84.4 kg)              We Performed the Following     Hepatitis C antibody     Reflex INR        Primary Care Provider Office Phone # Fax #    Nathanael Mena [phone]       [address] [place] [address]        Equal Access to Services     CRISTIANE CHONG : Hadii alexandria hinojosa Someño, waaxda luqadaha, qaybta kaalmada rachel rivera. So [facility] [phone].    ATENCIÓN: Si habla español, tiene a banuelos disposición servicios gratuitos de asistencia lingüística. Llame al [phone].    We comply " "with applicable federal civil rights laws and [place] laws. We do not discriminate on the basis of race, color, national origin, age, disability, sex, sexual orientation, or gender identity.            Thank you!     Thank you for choosing [facility]  for your care. Our goal is always to provide you with excellent care. Hearing back from our patients is one way we can continue to improve our services. Please take a few minutes to complete the written survey that you may receive in the mail after your visit with us. Thank you!             Your Updated Medication List - Protect others around you: Learn how to safely use, store and throw away your medicines at www.disposemymeds.org.          This list is accurate as of 5/1/18 12:33 PM.  Always use your most recent med list.                   Brand Name Dispense Instructions for use Diagnosis    ASPIRIN PO      Take 81 mg by mouth daily        * BD U/F III SHORT PEN NEEDLE      See instructions.        * pen needles 5/16\" 31G X 8 MM Misc           calcium-vitamin D 600-400 MG-UNIT per tablet    CALTRATE     Take 1 tablet by mouth daily.        furosemide 40 MG tablet    LASIX    180 tablet    Take 1 tablet (40 mg) by mouth 2 times daily    Benign essential hypertension       GLUCOPHAGE PO      Take 1,000 mg by mouth 2 times daily (with meals).        IBUPROFEN PO           LANTUS SOLOSTAR 100 UNIT/ML injection   Generic drug:  insulin glargine      Inject 30 Units Subcutaneous At Bedtime        liraglutide 18 MG/3ML Soln    VICTOZA     Inject 1.8 mg Subcutaneous daily        multivitamin, therapeutic with minerals Tabs tablet      Take 1 tablet by mouth daily.        ONE TOUCH ULTRA      1 each strip TID AC        SIMVASTATIN PO      Take 40 mg by mouth daily.        venlafaxine 75 MG 24 hr capsule    EFFEXOR-XR    180 capsule    TAKE TWO CAPSULES BY MOUTH DAILY    Dysthymia       * Notice:  This list has 2 medication(s) that are the same as other " medications prescribed for you. Read the directions carefully, and ask your doctor or other care provider to review them with you.

## 2018-05-02 ENCOUNTER — HOSPITAL ENCOUNTER (OUTPATIENT)
Dept: ULTRASOUND IMAGING | Facility: HOSPITAL | Age: 58
Discharge: HOME | End: 2018-05-02
Attending: FAMILY MEDICINE | Admitting: FAMILY MEDICINE
Payer: COMMERCIAL

## 2018-05-02 ENCOUNTER — TELEPHONE (OUTPATIENT)
Dept: FAMILY MEDICINE | Facility: OTHER | Age: 58
End: 2018-05-02

## 2018-05-02 DIAGNOSIS — R79.89 ELEVATED LFTS: ICD-10-CM

## 2018-05-02 PROCEDURE — 76705 ECHO EXAM OF ABDOMEN: CPT | Mod: TC

## 2018-05-02 ASSESSMENT — PATIENT HEALTH QUESTIONNAIRE - PHQ9: SUM OF ALL RESPONSES TO PHQ QUESTIONS 1-9: 2

## 2018-05-02 ASSESSMENT — ANXIETY QUESTIONNAIRES: GAD7 TOTAL SCORE: 0

## 2018-05-03 LAB — HCV AB SERPL QL IA: NONREACTIVE

## 2018-05-23 ENCOUNTER — MYC MEDICAL ADVICE (OUTPATIENT)
Dept: FAMILY MEDICINE | Facility: OTHER | Age: 58
End: 2018-05-23

## 2018-05-23 DIAGNOSIS — Q82.0 HEREDITARY EDEMA OF LEGS: Primary | ICD-10-CM

## 2018-05-23 RX ORDER — SULFAMETHOXAZOLE/TRIMETHOPRIM 800-160 MG
1 TABLET ORAL 2 TIMES DAILY
Qty: 14 TABLET | Refills: 0 | Status: SHIPPED | OUTPATIENT
Start: 2018-05-23 | End: 2018-12-17

## 2018-05-31 DIAGNOSIS — I10 BENIGN ESSENTIAL HYPERTENSION: ICD-10-CM

## 2018-06-01 NOTE — TELEPHONE ENCOUNTER
Lasix  Last Written Prescription Date: 11/1/16  Last Fill Quantity: 180 # of Refills: 2  Last Office Visit: 5/1/18

## 2018-06-04 RX ORDER — FUROSEMIDE 40 MG
TABLET ORAL
Qty: 180 TABLET | Refills: 0 | Status: SHIPPED | OUTPATIENT
Start: 2018-06-04 | End: 2018-08-30

## 2018-06-11 ENCOUNTER — TRANSFERRED RECORDS (OUTPATIENT)
Dept: HEALTH INFORMATION MANAGEMENT | Facility: CLINIC | Age: 58
End: 2018-06-11

## 2018-06-11 LAB — HBA1C MFR BLD: 7.4 % (ref 4–6)

## 2018-06-20 DIAGNOSIS — F34.1 DYSTHYMIA: ICD-10-CM

## 2018-06-20 RX ORDER — VENLAFAXINE HYDROCHLORIDE 75 MG/1
CAPSULE, EXTENDED RELEASE ORAL
Qty: 180 CAPSULE | Refills: 0 | Status: SHIPPED | OUTPATIENT
Start: 2018-06-20 | End: 2018-09-17

## 2018-07-23 ENCOUNTER — TRANSFERRED RECORDS (OUTPATIENT)
Dept: HEALTH INFORMATION MANAGEMENT | Facility: CLINIC | Age: 58
End: 2018-07-23

## 2018-08-30 DIAGNOSIS — I10 BENIGN ESSENTIAL HYPERTENSION: ICD-10-CM

## 2018-08-31 RX ORDER — FUROSEMIDE 40 MG
TABLET ORAL
Qty: 180 TABLET | Refills: 0 | Status: SHIPPED | OUTPATIENT
Start: 2018-08-31 | End: 2018-11-28

## 2018-08-31 NOTE — TELEPHONE ENCOUNTER
furosemide (LASIX) 40 MG tablet    Last Written Prescription Date:  06/04/2018  Last Fill Quantity: 180,   # refills: 0  Last Office Visit: 05/01/2018  Future Office visit:       Routing refill request to provider for review/approval because:

## 2018-09-17 DIAGNOSIS — F34.1 DYSTHYMIA: ICD-10-CM

## 2018-09-17 NOTE — TELEPHONE ENCOUNTER
VENLAFAXINE HCL ER 75 MG CAP    Last Written Prescription Date:  6/20/18  Last Fill Quantity: 180,   # refills: 0  Last Office Visit: 5/1/18  Future Office visit:       Routing refill request to provider for review/approval because:

## 2018-09-18 RX ORDER — VENLAFAXINE HYDROCHLORIDE 75 MG/1
CAPSULE, EXTENDED RELEASE ORAL
Qty: 180 CAPSULE | Refills: 0 | Status: SHIPPED | OUTPATIENT
Start: 2018-09-18 | End: 2018-12-18

## 2018-10-23 NOTE — PROGRESS NOTES
SUBJECTIVE:                                                    Suzy Gallardo is a 58 year old female who presents to clinic today for the following health issues:    Diabetes Follow-up      Patient is checking blood sugars: not at all    Diabetic concerns: None     Symptoms of hypoglycemia (low blood sugar): none     Paresthesias (numbness or burning in feet) or sores: No     Date of last diabetic eye exam: July 2018    Diabetes Management Resources    Hyperlipidemia Follow-Up      Rate your low fat/cholesterol diet?: not monitoring fat    Taking statin?  Yes, no muscle aches from statin    Other lipid medications/supplements?:  none    Hypertension Follow-up      Outpatient blood pressures are not being checked.    Low Salt Diet: not monitoring salt    BP Readings from Last 2 Encounters:   05/01/18 134/74   04/17/18 128/82     Hemoglobin A1C (%)   Date Value   06/11/2018 7.4 (H)   02/19/2018 8.1 (H)     LDL Cholesterol Calculated (mg/dL)   Date Value   08/18/2017 57   02/16/2016 67       Amount of exercise or physical activity: None    Problems taking medications regularly: No    Medication side effects: none    Diet: regular (no restrictions)        Depression and Anxiety Follow-Up    Status since last visit: Worsened     Other associated symptoms:Chest tight from increased anxiety    Complicating factors:     Significant life event: Yes-  Had place mother in assisted living      Current substance abuse: None    PHQ 8/28/2017 4/17/2018 5/1/2018   PHQ-9 Total Score 3 1 2   Q9: Suicide Ideation Not at all Not at all Not at all     HEMANTH-7 SCORE 8/28/2017 4/17/2018 5/1/2018   Total Score 1 1 0     Lots of stress recently.  Mother nearing end of life perhaps, but in assisted living, and clearing out the house.  Lots of responsibility.    PHQ-9  English  PHQ-9   Any Language  HEMANTH-7  Suicide Assessment Five-step Evaluation and Treatment (SAFE-T)    Problem list and histories reviewed & adjusted, as  "indicated.  Additional history: lengthy review today.  Chest pressure with stressful feelings.  Sugars not really watched of late.  Strong FH of cad with dad with CABG.    Patient Active Problem List   Diagnosis     [place] disease      Cellulitis     Annual physical exam     Morbid obesity (H)     Disorder of lipoid metabolism     Status post hysterectomy     ACP (advance care planning)     Type 2 diabetes mellitus without complication (H)     Depression     Hyperlipidemia     Elevated liver function tests     Past Surgical History:   Procedure Laterality Date     BREAST SURGERY      breast biopsy     COLONOSCOPY  5/3/2013    Procedure: COLONOSCOPY;  COLONOSCOPY;  Surgeon: Pablito Urbina MD;  Location: [facility]     ENDOMETRIAL SAMPLING (BIOPSY)       HYSTERECTOMY       LAPAROSCOPY       NOSE SURGERY       wisdom teeth removed         Social History   Substance Use Topics     Smoking status: Never Smoker     Smokeless tobacco: Never Used     Alcohol use No     Family History   Problem Relation Age of Onset     C.A.D. Father      Cancer Brother      gastric (cause of death)      Hypertension Other          Current Outpatient Prescriptions   Medication Sig Dispense Refill     amoxicillin-clavulanate (AUGMENTIN) 875-125 MG per tablet Take 1 tablet by mouth 2 times daily  0     ASPIRIN PO Take 81 mg by mouth daily        Blood Glucose Monitoring Suppl (ONE TOUCH ULTRA) 1 each strip TID AC       calcium-vitamin D (CALTRATE) 600-400 MG-UNIT per tablet Take 1 tablet by mouth daily.       furosemide (LASIX) 40 MG tablet TAKE 1 TABLET (40 MG) BY MOUTH 2 TIMES DAILY 180 tablet 0     IBUPROFEN PO        insulin glargine (LANTUS SOLOSTAR) 100 UNIT/ML injection Inject 30 Units Subcutaneous At Bedtime        Insulin Pen Needle (BD U/F III SHORT PEN NEEDLE) See instructions.       Insulin Pen Needle (PEN NEEDLES 5/16\") 31G X 8 MM MISC        liraglutide (VICTOZA) 18 MG/3ML SOLN Inject 1.8 mg Subcutaneous daily        MetFORMIN HCl " "(GLUCOPHAGE PO) Take 1,000 mg by mouth 2 times daily (with meals).       multivitamin, therapeutic with minerals (THERA-VIT-M) TABS Take 1 tablet by mouth daily.       SIMVASTATIN PO Take 40 mg by mouth daily.       sulfamethoxazole-trimethoprim (BACTRIM DS/SEPTRA DS) 800-160 MG per tablet Take 1 tablet by mouth 2 times daily 14 tablet 0     venlafaxine (EFFEXOR-XR) 75 MG 24 hr capsule TAKE TWO CAPSULES BY MOUTH DAILY. DUE FOR FOLLOW UP VISIT 180 capsule 0     No Known Allergies    ROS:  Constitutional, HEENT, cardiovascular, pulmonary, gi and gu systems are negative, except as otherwise noted.    OBJECTIVE:                                                    /70  Pulse 70  Temp 98  F (36.7  C) (Tympanic)  Ht 5' 2\" (1.575 m)  Wt 187 lb (84.8 kg)  SpO2 98%  BMI 34.2 kg/m2  Body mass index is 34.2 kg/(m^2).  GENERAL APPEARANCE: Alert, no acute distress  CV: regular rate and rhythm, no murmur, rub or gallop  RESP: lungs clear to auscultation bilaterally  ABDOMEN: normal bowel sounds, soft, nontender, no hepatosplenomegaly or other masses  SKIN: no suspicious lesions or rashes to visualized skin  NEURO: Alert, oriented x 3, speech and mentation normal      ekg normal.  Labs pending.      ASSESSMENT/PLAN:                                                    1. Type 2 diabetes mellitus without complication, unspecified whether long term insulin use (H)  Reviewed.  Stable, but worsened control recently with increased stress.  F/u with endocrine as well.  Following.   - Lipid Profile  - Comprehensive metabolic panel  - Albumin Random Urine Quantitative with Creat Ratio  - Hemoglobin A1c    2. Screening for HIV (human immunodeficiency virus)  Done.   - HIV Antigen Antibody Combo    3. Chest tightness or pressure  Discussed at length. Probably some anxiety/dysthymic sx with the increase in stress.  Not exertional.  Normal baseline ekg.  Given risk factors, stress the heart.  I think she will pass but none the less we " should.  - EKG 12-lead complete w/read - (Clinic Performed)  - NM Exercise stress test; Future      F/u routine.  30 minutes spent with patient over 50%in counseling about the stress, chest, depression pills (keeping them the same for now).      Nathanael Mena MD  [facility]

## 2018-10-24 ENCOUNTER — OFFICE VISIT (OUTPATIENT)
Dept: FAMILY MEDICINE | Facility: OTHER | Age: 58
End: 2018-10-24
Attending: FAMILY MEDICINE
Payer: COMMERCIAL

## 2018-10-24 VITALS
SYSTOLIC BLOOD PRESSURE: 122 MMHG | HEART RATE: 70 BPM | DIASTOLIC BLOOD PRESSURE: 70 MMHG | OXYGEN SATURATION: 98 % | TEMPERATURE: 98 F | BODY MASS INDEX: 34.41 KG/M2 | WEIGHT: 187 LBS | HEIGHT: 62 IN

## 2018-10-24 DIAGNOSIS — E11.9 TYPE 2 DIABETES MELLITUS WITHOUT COMPLICATION, UNSPECIFIED WHETHER LONG TERM INSULIN USE (H): Primary | ICD-10-CM

## 2018-10-24 DIAGNOSIS — Z11.4 SCREENING FOR HIV (HUMAN IMMUNODEFICIENCY VIRUS): ICD-10-CM

## 2018-10-24 DIAGNOSIS — R07.89 CHEST TIGHTNESS OR PRESSURE: ICD-10-CM

## 2018-10-24 LAB
ALBUMIN SERPL-MCNC: 3.6 G/DL (ref 3.4–5)
ALP SERPL-CCNC: 119 U/L (ref 40–150)
ALT SERPL W P-5'-P-CCNC: 44 U/L (ref 0–50)
ANION GAP SERPL CALCULATED.3IONS-SCNC: 11 MMOL/L (ref 3–14)
AST SERPL W P-5'-P-CCNC: 52 U/L (ref 0–45)
BILIRUB SERPL-MCNC: 0.4 MG/DL (ref 0.2–1.3)
BUN SERPL-MCNC: 8 MG/DL (ref 7–30)
CALCIUM SERPL-MCNC: 8.4 MG/DL (ref 8.5–10.1)
CHLORIDE SERPL-SCNC: 105 MMOL/L (ref 94–109)
CHOLEST SERPL-MCNC: 120 MG/DL
CO2 SERPL-SCNC: 26 MMOL/L (ref 20–32)
CREAT SERPL-MCNC: 0.57 MG/DL (ref 0.52–1.04)
CREAT UR-MCNC: 214 MG/DL
EST. AVERAGE GLUCOSE BLD GHB EST-MCNC: 226 MG/DL
GFR SERPL CREATININE-BSD FRML MDRD: >90 ML/MIN/1.7M2
GLUCOSE SERPL-MCNC: 227 MG/DL (ref 70–99)
HBA1C MFR BLD: 9.5 % (ref 0–5.6)
HDLC SERPL-MCNC: 27 MG/DL
LDLC SERPL CALC-MCNC: 31 MG/DL
MICROALBUMIN UR-MCNC: 28 MG/L
MICROALBUMIN/CREAT UR: 13.13 MG/G CR (ref 0–25)
NONHDLC SERPL-MCNC: 93 MG/DL
POTASSIUM SERPL-SCNC: 3.6 MMOL/L (ref 3.4–5.3)
PROT SERPL-MCNC: 6.9 G/DL (ref 6.8–8.8)
SODIUM SERPL-SCNC: 142 MMOL/L (ref 133–144)
TRIGL SERPL-MCNC: 310 MG/DL

## 2018-10-24 PROCEDURE — 93000 ELECTROCARDIOGRAM COMPLETE: CPT | Performed by: INTERNAL MEDICINE

## 2018-10-24 PROCEDURE — 87389 HIV-1 AG W/HIV-1&-2 AB AG IA: CPT | Mod: 90 | Performed by: FAMILY MEDICINE

## 2018-10-24 PROCEDURE — 99214 OFFICE O/P EST MOD 30 MIN: CPT | Performed by: FAMILY MEDICINE

## 2018-10-24 PROCEDURE — 36415 COLL VENOUS BLD VENIPUNCTURE: CPT | Performed by: FAMILY MEDICINE

## 2018-10-24 PROCEDURE — 82043 UR ALBUMIN QUANTITATIVE: CPT | Performed by: FAMILY MEDICINE

## 2018-10-24 PROCEDURE — 80061 LIPID PANEL: CPT | Performed by: FAMILY MEDICINE

## 2018-10-24 PROCEDURE — 80053 COMPREHEN METABOLIC PANEL: CPT | Performed by: FAMILY MEDICINE

## 2018-10-24 PROCEDURE — 99000 SPECIMEN HANDLING OFFICE-LAB: CPT | Performed by: FAMILY MEDICINE

## 2018-10-24 PROCEDURE — 83036 HEMOGLOBIN GLYCOSYLATED A1C: CPT | Performed by: FAMILY MEDICINE

## 2018-10-24 ASSESSMENT — ANXIETY QUESTIONNAIRES
6. BECOMING EASILY ANNOYED OR IRRITABLE: SEVERAL DAYS
5. BEING SO RESTLESS THAT IT IS HARD TO SIT STILL: NOT AT ALL
7. FEELING AFRAID AS IF SOMETHING AWFUL MIGHT HAPPEN: NOT AT ALL
3. WORRYING TOO MUCH ABOUT DIFFERENT THINGS: SEVERAL DAYS
1. FEELING NERVOUS, ANXIOUS, OR ON EDGE: MORE THAN HALF THE DAYS
GAD7 TOTAL SCORE: 6
2. NOT BEING ABLE TO STOP OR CONTROL WORRYING: SEVERAL DAYS

## 2018-10-24 ASSESSMENT — PAIN SCALES - GENERAL: PAINLEVEL: SEVERE PAIN (6)

## 2018-10-24 ASSESSMENT — PATIENT HEALTH QUESTIONNAIRE - PHQ9: 5. POOR APPETITE OR OVEREATING: SEVERAL DAYS

## 2018-10-24 NOTE — NURSING NOTE
"Chief Complaint   Patient presents with     Diabetes       Initial /70  Pulse 70  Temp 98  F (36.7  C) (Tympanic)  Ht 5' 2\" (1.575 m)  Wt 187 lb (84.8 kg)  SpO2 98%  BMI 34.2 kg/m2 Estimated body mass index is 34.2 kg/(m^2) as calculated from the following:    Height as of this encounter: 5' 2\" (1.575 m).    Weight as of this encounter: 187 lb (84.8 kg).  Medication Reconciliation: complete    Brandie Lee LPN  "

## 2018-10-24 NOTE — LETTER
My Depression Action Plan  Name: Suzy Gallardo   Date of Birth 1960  Date: 10/23/2018    My doctor: Nathanael Mena   My clinic: [address] [place] [facility] [address]  [phone]          GREEN    ZONE   Good Control    What it looks like:     Things are going generally well. You have normal up s and down s. You may even feel depressed from time to time, but bad moods usually last less than a day.   What you need to do:  1. Continue to care for yourself (see self care plan)  2. Check your depression survival kit and update it as needed  3. Follow your physician s recommendations including any medication.  4. Do not stop taking medication unless you consult with your physician first.           YELLOW         ZONE Getting Worse    What it looks like:     Depression is starting to interfere with your life.     It may be hard to get out of bed; you may be starting to isolate yourself from others.    Symptoms of depression are starting to last most all day and this has happened for several days.     You may have suicidal thoughts but they are not constant.   What you need to do:     1. Call your care team, your response to treatment will improve if you keep your care team informed of your progress. Yellow periods are signs an adjustment may need to be made.     2. Continue your self-care, even if you have to fake it!    3. Talk to someone in your support network    4. Open up your depression survival kit           RED    ZONE Medical Alert - Get Help    What it looks like:     Depression is seriously interfering with your life.     You may experience these or other symptoms: You can t get out of bed most days, can t work or engage in other necessary activities, you have trouble taking care of basic hygiene, or basic responsibilities, thoughts of suicide or death that will not go away, self-injurious behavior.     What you need to do:  1. Call your care team and  request a same-day appointment. If they are not available (weekends or after hours) call your local crisis line, emergency room or 911.            Depression Self Care Plan / Survival Kit    Self-Care for Depression  Here s the deal. Your body and mind are really not as separate as most people think.  What you do and think affects how you feel and how you feel influences what you do and think. This means if you do things that people who feel good do, it will help you feel better.  Sometimes this is all it takes.  There is also a place for medication and therapy depending on how severe your depression is, so be sure to consult with your medical provider and/ or Behavioral Health Consultant if your symptoms are worsening or not improving.     In order to better manage my stress, I will:    Exercise  Get some form of exercise, every day. This will help reduce pain and release endorphins, the  feel good  chemicals in your brain. This is almost as good as taking antidepressants!  This is not the same as joining a gym and then never going! (they count on that by the way ) It can be as simple as just going for a walk or doing some gardening, anything that will get you moving.      Hygiene   Maintain good hygiene (Get out of bed in the morning, Make your bed, Brush your teeth, Take a shower, and Get dressed like you were going to work, even if you are unemployed).  If your clothes don't fit try to get ones that do.    Diet  I will strive to eat foods that are good for me, drink plenty of water, and avoid excessive sugar, caffeine, alcohol, and other mood-altering substances.  Some foods that are helpful in depression are: complex carbohydrates, B vitamins, flaxseed, fish or fish oil, fresh fruits and vegetables.    Psychotherapy  I agree to participate in Individual Therapy (if recommended).    Medication  If prescribed medications, I agree to take them.  Missing doses can result in serious side effects.  I understand that  drinking alcohol, or other illicit drug use, may cause potential side effects.  I will not stop my medication abruptly without first discussing it with my provider.    Staying Connected With Others  I will stay in touch with my friends, family members, and my primary care provider/team.    Use your imagination  Be creative.  We all have a creative side; it doesn t matter if it s oil painting, sand castles, or mud pies! This will also kick up the endorphins.    Witness Beauty  (AKA stop and smell the roses) Take a look outside, even in mid-winter. Notice colors, textures. Watch the squirrels and birds.     Service to others  Be of service to others.  There is always someone else in need.  By helping others we can  get out of ourselves  and remember the really important things.  This also provides opportunities for practicing all the other parts of the program.    Humor  Laugh and be silly!  Adjust your TV habits for less news and crime-drama and more comedy.    Control your stress  Try breathing deep, massage therapy, biofeedback, and meditation. Find time to relax each day.     My support system    Clinic Contact:  Phone number:    Contact 1:  Phone number:    Contact 2:  Phone number:    Oriental orthodox/:  Phone number:    Therapist:  Phone number:    Local crisis center:    Phone number:    Other community support:  Phone number:

## 2018-10-25 ASSESSMENT — PATIENT HEALTH QUESTIONNAIRE - PHQ9: SUM OF ALL RESPONSES TO PHQ QUESTIONS 1-9: 5

## 2018-10-25 ASSESSMENT — ANXIETY QUESTIONNAIRES: GAD7 TOTAL SCORE: 6

## 2018-10-26 LAB — HIV 1+2 AB+HIV1 P24 AG SERPL QL IA: NONREACTIVE

## 2018-10-31 ENCOUNTER — HOSPITAL ENCOUNTER (OUTPATIENT)
Dept: NUCLEAR MEDICINE | Facility: HOSPITAL | Age: 58
Setting detail: NUCLEAR MEDICINE
End: 2018-10-31
Attending: FAMILY MEDICINE
Payer: COMMERCIAL

## 2018-10-31 ENCOUNTER — HOSPITAL ENCOUNTER (OUTPATIENT)
Dept: NUCLEAR MEDICINE | Facility: HOSPITAL | Age: 58
Setting detail: NUCLEAR MEDICINE
Discharge: HOME | End: 2018-10-31
Attending: FAMILY MEDICINE | Admitting: FAMILY MEDICINE
Payer: COMMERCIAL

## 2018-10-31 ENCOUNTER — HOSPITAL ENCOUNTER (OUTPATIENT)
Dept: CARDIOLOGY | Facility: HOSPITAL | Age: 58
Setting detail: NUCLEAR MEDICINE
Discharge: HOME | End: 2018-10-31
Attending: FAMILY MEDICINE | Admitting: FAMILY MEDICINE
Payer: COMMERCIAL

## 2018-10-31 DIAGNOSIS — R07.89 CHEST TIGHTNESS OR PRESSURE: ICD-10-CM

## 2018-10-31 PROCEDURE — 93017 CV STRESS TEST TRACING ONLY: CPT

## 2018-10-31 PROCEDURE — 25000128 H RX IP 250 OP 636: Performed by: INTERNAL MEDICINE

## 2018-10-31 PROCEDURE — 93018 CV STRESS TEST I&R ONLY: CPT | Performed by: INTERNAL MEDICINE

## 2018-10-31 PROCEDURE — 34300033 ZZH RX 343: Performed by: RADIOLOGY

## 2018-10-31 PROCEDURE — A9500 TC99M SESTAMIBI: HCPCS | Performed by: RADIOLOGY

## 2018-10-31 PROCEDURE — 93016 CV STRESS TEST SUPVJ ONLY: CPT | Performed by: INTERNAL MEDICINE

## 2018-10-31 PROCEDURE — 78452 HT MUSCLE IMAGE SPECT MULT: CPT | Mod: TC

## 2018-10-31 RX ORDER — SODIUM CHLORIDE 9 MG/ML
INJECTION, SOLUTION INTRAVENOUS ONCE
Status: COMPLETED | OUTPATIENT
Start: 2018-10-31 | End: 2018-10-31

## 2018-10-31 RX ADMIN — Medication 30 MILLICURIE: at 10:00

## 2018-10-31 RX ADMIN — Medication 10 MILLICURIE: at 07:40

## 2018-10-31 RX ADMIN — SODIUM CHLORIDE: 9 INJECTION, SOLUTION INTRAVENOUS at 09:42

## 2018-11-08 ENCOUNTER — TELEPHONE (OUTPATIENT)
Dept: FAMILY MEDICINE | Facility: OTHER | Age: 58
End: 2018-11-08

## 2018-11-08 DIAGNOSIS — K76.89 LIVER NODULE: Primary | ICD-10-CM

## 2018-11-13 ENCOUNTER — HOSPITAL ENCOUNTER (OUTPATIENT)
Dept: ULTRASOUND IMAGING | Facility: HOSPITAL | Age: 58
Discharge: HOME | End: 2018-11-13
Attending: FAMILY MEDICINE | Admitting: FAMILY MEDICINE
Payer: COMMERCIAL

## 2018-11-13 DIAGNOSIS — K76.89 LIVER NODULE: ICD-10-CM

## 2018-11-13 PROCEDURE — 76705 ECHO EXAM OF ABDOMEN: CPT | Mod: TC

## 2018-11-26 ENCOUNTER — MYC MEDICAL ADVICE (OUTPATIENT)
Dept: FAMILY MEDICINE | Facility: OTHER | Age: 58
End: 2018-11-26

## 2018-11-26 DIAGNOSIS — R94.39 EQUIVOCAL STRESS TEST: Primary | ICD-10-CM

## 2018-12-17 ENCOUNTER — OFFICE VISIT (OUTPATIENT)
Dept: CARDIOLOGY | Facility: OTHER | Age: 58
End: 2018-12-17
Attending: FAMILY MEDICINE
Payer: COMMERCIAL

## 2018-12-17 VITALS
WEIGHT: 193 LBS | HEIGHT: 62 IN | DIASTOLIC BLOOD PRESSURE: 61 MMHG | BODY MASS INDEX: 35.51 KG/M2 | TEMPERATURE: 98.8 F | HEART RATE: 76 BPM | OXYGEN SATURATION: 97 % | SYSTOLIC BLOOD PRESSURE: 138 MMHG

## 2018-12-17 DIAGNOSIS — R94.39 ABNORMAL STRESS TEST: Primary | ICD-10-CM

## 2018-12-17 DIAGNOSIS — E66.01 MORBID OBESITY (H): ICD-10-CM

## 2018-12-17 DIAGNOSIS — Z79.4 TYPE 2 DIABETES MELLITUS WITHOUT COMPLICATION, WITH LONG-TERM CURRENT USE OF INSULIN (H): ICD-10-CM

## 2018-12-17 DIAGNOSIS — E11.9 TYPE 2 DIABETES MELLITUS WITHOUT COMPLICATION, WITH LONG-TERM CURRENT USE OF INSULIN (H): ICD-10-CM

## 2018-12-17 DIAGNOSIS — R07.89 CHEST DISCOMFORT: ICD-10-CM

## 2018-12-17 DIAGNOSIS — I10 BENIGN ESSENTIAL HYPERTENSION: ICD-10-CM

## 2018-12-17 DIAGNOSIS — E78.2 MIXED HYPERLIPIDEMIA: ICD-10-CM

## 2018-12-17 DIAGNOSIS — F41.9 ANXIETY: ICD-10-CM

## 2018-12-17 PROBLEM — K76.0 HEPATIC STEATOSIS: Status: ACTIVE | Noted: 2018-12-17

## 2018-12-17 PROCEDURE — 99204 OFFICE O/P NEW MOD 45 MIN: CPT | Performed by: INTERNAL MEDICINE

## 2018-12-17 PROCEDURE — 93000 ELECTROCARDIOGRAM COMPLETE: CPT | Performed by: INTERNAL MEDICINE

## 2018-12-17 RX ORDER — METOPROLOL TARTRATE 50 MG
50 TABLET ORAL PRN
Qty: 2 TABLET | Refills: 0 | Status: SHIPPED | OUTPATIENT
Start: 2018-12-17 | End: 2019-05-07

## 2018-12-17 ASSESSMENT — PAIN SCALES - GENERAL: PAINLEVEL: NO PAIN (0)

## 2018-12-17 ASSESSMENT — MIFFLIN-ST. JEOR: SCORE: 1418.69

## 2018-12-17 NOTE — PROGRESS NOTES
[facility] HEART CARE   CARDIOLOGY CONSULT     Suzy Gallardo   1960  [id number]    Nathanael Mena     Chief Complaint   Patient presents with     Consult     Chest pain and tightness          HPI:   Mrs. Gallardo is a 58-year-old female who has been referred to cardiology for an abnormal stress test on 10/31/8.  She had a Cardiolite stress test on 10/31/18 with a normal image portion but an abnormal EKG portion with 1.5-2.5 mm depressions.  She also has a history of uncontrolled diabetes mellitus type 2, hyperlipidemia currently on Zocor 40 mg in evening, hypertension, obesity, depression, stress, and anxiety.    She has been managing the care of her mother since July, 2018.  She also is a .  Since July, 2018, she has had to move her mother out of her house and was preparing to sell her mother's home.  She has been working during the day doing teaching and has been spending her nights and weekends working with her mother's care.  She is under considerable amounts of stress.  When she has stress, she describes a tightness to her chest described as a tightness with shortness of breath.  He will last up to 30 minutes.  Her discomfort is not exertional.  Review is to July, 2018 she had been having episodes of similar discomfort but to a lesser degree.  Pain does not radiate.  She denies any nausea, vomiting, diaphoresis, or dizziness.  She does not have a history of coronary disease with stenting or bypass.  Her risk factors include obesity, sedentary lifestyle, family history with a father who had up to 18 stents placed but was a smoker, hyperlipidemia, hypertension, and secondhand smoke exposure by her mother and father as a child.  She herself has not been a primary smoker.    She had a stress test completed on 10/31/18 that was negative for evidence of myocardial ischemia, normal ejection fraction 85%, but EKG portion showed a 1.5 mm to 2.5 mm depression in leads II, III, aVF, V4, V5, and  V6.    She is also known diabetic with uncontrolled diabetes and A1c of 9.5% on 10/24/18.  Her cholesterol is relatively well controlled on Zocor 40 mg in evening with the exception of a triglyceride of 310 and HDL 27.  Her total cholesterol was 120 with an LDL of 31.  She is currently on Lasix 40 mg twice a day secondary to chronic lower extremity edema.    IMAGING RESULTS:   PROCEDURE: NM MPI TREADMILL 10/31/2018 10:58 AM     HISTORY: ; Chest tightness or pressure     COMPARISONS:   None     TECHNIQUE: At rest 10 mCi of technetium 99m sestamibi was injected.  The patient was stressed and 30 mCi of technetium 99m sestamibi was  injected.     FINDINGS: On the rest and stress images there was a normal  distribution of tracer activity throughout the myocardium. There is no  evidence of myocardial ischemia. The rest gated images reveal the end  diastolic volume to be 40 mL. The end-systolic volume is a 6 mL. The  calculated ejection fraction is 85%.                                                                        IMPRESSION: No evidence of myocardial ischemia. Normal left  ventricular function.     RAMON JEAN BAPTISTE MD    ALLERGIES:   No Known Allergies     PAST MEDICAL HISTORY:   Past Medical History:   Diagnosis Date     Anemia, unspecified 06/23/2004    Lali Monroe MD      Cellulitis and abscess of hand, except fingers and thumb 01/11/2002     Cellulitis and abscess of unspecified site 02/22/2002    Jaime Rodarte MD      Depressive disorder, not elsewhere classified 01/19/2004     Diabetes mellitus (H)      Diabetes mellitus without mention of complication, 11/30/2001     Endometriosis of uterus 12/19/2001     Follow-up examination, following unspecified surgery 10/02/2001     Ingrowing nail 03/21/2000     Metrorrhagia 07/08/2004     [place] disease  03/01/2012    Nathanael Mena MD      Need for prophylactic vaccination and inoculation against influenza 11/06/2003     Other (abnormal) findings on  radiological examination of breast 08/15/2002    Alfonso Patton MD      Other screening mammogram 07/25/2000     Pure hypercholesterolemia 11/26/1999    Cristian Bradley MD      Pure hyperglyceridemia 01/11/2000    Cristian Bradley MD      Routine general medical examination at a health care facility 04/07/2003     Sterilization 12/04/2001     Unspecified symptom associated with female genital organs 10/30/2001     Vaginitis and vulvovaginitis, unspecified 11/21/2001        PAST SURGICAL HISTORY:   Past Surgical History:   Procedure Laterality Date     BREAST SURGERY      breast biopsy     COLONOSCOPY  5/3/2013    Procedure: COLONOSCOPY;  COLONOSCOPY;  Surgeon: Pablito Urbina MD;  Location: [facility] OR     ENDOMETRIAL SAMPLING (BIOPSY)       HYSTERECTOMY, CERVIX STATUS UNKNOWN       LAPAROSCOPY       NOSE SURGERY       wisdom teeth removed          FAMILY HISTORY:   Family History   Problem Relation Age of Onset     C.A.D. Father      Cancer Brother         gastric (cause of death)      Hypertension Other         SOCIAL HISTORY:   Social History     Socioeconomic History     Marital status:      Spouse name: None     Number of children: None     Years of education: None     Highest education level: None   Social Needs     Financial resource strain: None     Food insecurity - worry: None     Food insecurity - inability: None     Transportation needs - medical: None     Transportation needs - non-medical: None   Occupational History     Occupation: Teacher     Employer: [email or web address]   Tobacco Use     Smoking status: Never Smoker     Smokeless tobacco: Never Used   Substance and Sexual Activity     Alcohol use: No     Drug use: No     Sexual activity: None   Other Topics Concern      Service Not Asked     Blood Transfusions Yes     Caffeine Concern No     Occupational Exposure Not Asked     Hobby Hazards Not Asked     Sleep Concern Not Asked     Stress Concern Not Asked     Weight Concern Not Asked  "    Special Diet Not Asked     Back Care Not Asked     Exercise Not Asked     Bike Helmet Not Asked     Seat Belt Not Asked     Self-Exams Not Asked     Parent/sibling w/ CABG, MI or angioplasty before 65F 55M? Not Asked   Social History Narrative     None         CURRENT MEDICATIONS:   Prior to Admission medications    Medication Sig Start Date End Date Taking? Authorizing Provider   amoxicillin-clavulanate (AUGMENTIN) 875-125 MG per tablet Take 1 tablet by mouth 2 times daily 5/23/18   Reported, Patient   ASPIRIN PO Take 81 mg by mouth daily     Reported, Patient   Blood Glucose Monitoring Suppl (ONE TOUCH ULTRA) 1 each strip TID AC    Reported, Patient   calcium-vitamin D (CALTRATE) 600-400 MG-UNIT per tablet Take 1 tablet by mouth daily.    Reported, Patient   furosemide (LASIX) 40 MG tablet TAKE 1 TABLET (40 MG) BY MOUTH 2 TIMES DAILY 11/29/18   Nathanael Mena MD   IBUPROFEN PO     Reported, Patient   insulin glargine (LANTUS SOLOSTAR) 100 UNIT/ML injection Inject 30 Units Subcutaneous At Bedtime     Reported, Patient   Insulin Pen Needle (BD U/F III SHORT PEN NEEDLE) See instructions.    Reported, Patient   Insulin Pen Needle (PEN NEEDLES 5/16\") 31G X 8 MM MISC     Reported, Patient   liraglutide (VICTOZA) 18 MG/3ML SOLN Inject 1.8 mg Subcutaneous daily     Reported, Patient   MetFORMIN HCl (GLUCOPHAGE PO) Take 1,000 mg by mouth 2 times daily (with meals).    Reported, Patient   multivitamin, therapeutic with minerals (THERA-VIT-M) TABS Take 1 tablet by mouth daily.    Reported, Patient   SIMVASTATIN PO Take 40 mg by mouth daily.    Reported, Patient   venlafaxine (EFFEXOR-XR) 75 MG 24 hr capsule TAKE TWO CAPSULES BY MOUTH DAILY. DUE FOR FOLLOW UP VISIT 9/18/18   Nathanael Mena MD          ROS:   CONSTITUTIONAL: No weight loss, fever, chills, weakness or fatigue.   HEENT: Eyes: No visual changes. Ears, Nose, Throat: No hearing loss, congestion or difficulty swallowing.   CARDIOVASCULAR: No chest pain, " chest pressure or chest discomfort. No palpitations or lower extremity edema.   RESPIRATORY: No shortness of breath, dyspnea upon exertion, cough or sputum production.   GASTROINTESTINAL: No abdominal pain. No anorexia, nausea, vomiting or diarrhea.   NEUROLOGICAL: No headache, lightheadedness, dizziness, syncope, ataxia or weakness.   HEMATOLOGIC: No anemia, bleeding or bruising.   PSYCHIATRIC: No history of depression or anxiety.   ENDOCRINOLOGIC: No reports of sweating, cold or heat intolerance. No polyuria or polydipsia.   SKIN: No abnormal rashes or itching.       PHYSICAL EXAM:   GENERAL: The patient is a well-developed, well-nourished, in no apparent distress. Alert and oriented x3.   HEENT: Head is normocephalic and atraumatic. Eyes are symmetrical with normal visual tracking.  HEART: Regular rate and rhythm, S1S2 present without murmur, rub or gallop.   LUNGS: Respirations regular and unlabored. Clear to auscultation.   GI: Abdomen is soft and nondistended.   EXTREMITIES: Mild peripheral edema present.   MUSCULOSKELETAL: No joint swelling.   NEUROLOGIC: Alert and oriented X3.    SKIN: No jaundice. No rashes or visible skin lesions present.       EKG:    Her EKG from today, 12/17/18, shows sinus rhythm.    LAB RESULTS:   Office Visit on 10/24/2018   Component Date Value Ref Range Status     Cholesterol 10/24/2018 120  <200 mg/dL Final     Triglycerides 10/24/2018 310* <150 mg/dL Final     HDL Cholesterol 10/24/2018 27* >49 mg/dL Final     LDL Cholesterol Calculated 10/24/2018 31  <100 mg/dL Final     Non HDL Cholesterol 10/24/2018 93  <130 mg/dL Final     HIV Antigen Antibody Combo 10/24/2018 Nonreactive  NR^Nonreactive     Final     Sodium 10/24/2018 142  133 - 144 mmol/L Final     Potassium 10/24/2018 3.6  3.4 - 5.3 mmol/L Final     Chloride 10/24/2018 105  94 - 109 mmol/L Final     Carbon Dioxide 10/24/2018 26  20 - 32 mmol/L Final     Anion Gap 10/24/2018 11  3 - 14 mmol/L Final     Glucose 10/24/2018  227* 70 - 99 mg/dL Final     Urea Nitrogen 10/24/2018 8  7 - 30 mg/dL Final     Creatinine 10/24/2018 0.57  0.52 - 1.04 mg/dL Final     GFR Estimate 10/24/2018 >90  >60 mL/min/1.7m2 Final     GFR Estimate If Black 10/24/2018 >90  >60 mL/min/1.7m2 Final     Calcium 10/24/2018 8.4* 8.5 - 10.1 mg/dL Final     Bilirubin Total 10/24/2018 0.4  0.2 - 1.3 mg/dL Final     Albumin 10/24/2018 3.6  3.4 - 5.0 g/dL Final     Protein Total 10/24/2018 6.9  6.8 - 8.8 g/dL Final     Alkaline Phosphatase 10/24/2018 119  40 - 150 U/L Final     ALT 10/24/2018 44  0 - 50 U/L Final     AST 10/24/2018 52* 0 - 45 U/L Final     Creatinine Urine 10/24/2018 214  mg/dL Final     Albumin Urine mg/L 10/24/2018 28  mg/L Final     Albumin Urine mg/g Cr 10/24/2018 13.13  0 - 25 mg/g Cr Final     Hemoglobin A1C 10/24/2018 9.5* 0 - 5.6 % Final     Estimated Average Glucose 10/24/2018 226  mg/dL Final            ASSESSMENT:       ICD-10-CM    1. Abnormal stress test R94.39 EKG 12-lead complete w/read - (Clinic Performed)     CT Angiogram coronary artery   2. Chest discomfort R07.89 CT Angiogram coronary artery   3. Morbid obesity (H) E66.01    4. Mixed hyperlipidemia E78.2 CT Angiogram coronary artery   5. Type 2 diabetes mellitus without complication, with long-term current use of insulin (H) E11.9 CT Angiogram coronary artery    Z79.4    6. Benign essential hypertension I10 CT Angiogram coronary artery     metoprolol tartrate (LOPRESSOR) 50 MG tablet   7. Anxiety F41.9          PLAN:   1.  As mentioned, she has stress test on 10/31/18 which showed normal images but an abnormal EKG portion.  She describes emotional chest tightness without radiation in conjunction with shortness of breath.  She does have risk factors of heart disease.  She is concerned with a family history of heart disease with her dad having approximately 18 stents placed.  In light of her abnormal stress test and her history, it was suggest that she have a CTA of her coronaries and  she was agreeable.  2.  No additional changes were made.  3.  She will be called with results if they are normal, she will be seen in the future on a as needed basis.  However, if the results are abnormal it was suggested that she be scheduled for an angiogram and that was discussed today.  She will be seen in follow-up if the test is abnormal.      Thank you for allowing me to participate in the care of your patient. Please do not hesitate to contact me if you have any questions.     Michael Yanez, DO

## 2018-12-17 NOTE — NURSING NOTE
"Chief Complaint   Patient presents with     Consult     Chest pain and tightness, had a stress test       Initial /61 (BP Location: Left arm, Patient Position: Sitting, Cuff Size: Adult Regular)   Pulse 76   Temp 98.8  F (37.1  C) (Tympanic)   Ht 1.575 m (5' 2\")   Wt 87.5 kg (193 lb)   SpO2 97%   BMI 35.30 kg/m   Estimated body mass index is 35.3 kg/m  as calculated from the following:    Height as of this encounter: 1.575 m (5' 2\").    Weight as of this encounter: 87.5 kg (193 lb).  Medication Reconciliation: complete    Isabell Frankel LPN  "

## 2018-12-17 NOTE — PATIENT INSTRUCTIONS
You were seen by Dr. Yanez, 12/17/2018.     1.  You will be scheduled for a CT of the coronary arteries to evaluate for blockage in the vessels supplying the heart with blood.  Hospital scheduling will call to schedule this appointment.     2.  You will be prescribed Metoprolol 50 mg to take the evening prior to the CT scan and the morning of the scan.      3. If you develop new or worsening symptoms please call the cardiology office as you may need to be seen sooner.     4. You will be called with the results as they become available.       You will follow up with Dr. Yanez as needed.       Please call the cardiology office with problems, questions, or concerns at [phone].    If you experience chest pain, chest pressure, chest tightness, shortness of breath, fainting, lightheadedness, nausea, vomiting, or other concerning symptoms, please report to the Emergency Department or call 911. These symptoms may be emergent, and best treated in the Emergency Department.       Christa MORALES RN-BSN  Cardiology   [facility]  [phone]

## 2018-12-20 RX ORDER — ACYCLOVIR 200 MG/1
0-1 CAPSULE ORAL
Status: CANCELLED | OUTPATIENT
Start: 2018-12-20

## 2018-12-20 RX ORDER — METOPROLOL TARTRATE 1 MG/ML
5-15 INJECTION, SOLUTION INTRAVENOUS
Status: CANCELLED | OUTPATIENT
Start: 2019-01-04

## 2018-12-20 RX ORDER — METOPROLOL TARTRATE 50 MG
50-100 TABLET ORAL
Status: CANCELLED | OUTPATIENT
Start: 2019-01-04

## 2018-12-20 RX ORDER — NITROGLYCERIN 0.4 MG/1
0.4 TABLET SUBLINGUAL
Status: CANCELLED | OUTPATIENT
Start: 2019-01-04

## 2019-01-03 ENCOUNTER — TELEPHONE (OUTPATIENT)
Dept: INTERVENTIONAL RADIOLOGY/VASCULAR | Facility: HOSPITAL | Age: 59
End: 2019-01-03

## 2019-01-04 ENCOUNTER — HOSPITAL ENCOUNTER (OUTPATIENT)
Dept: CT IMAGING | Facility: HOSPITAL | Age: 59
Discharge: HOME | End: 2019-01-04
Attending: INTERNAL MEDICINE | Admitting: INTERNAL MEDICINE
Payer: COMMERCIAL

## 2019-01-04 ENCOUNTER — APPOINTMENT (OUTPATIENT)
Dept: LAB | Facility: HOSPITAL | Age: 59
End: 2019-01-04
Attending: FAMILY MEDICINE
Payer: COMMERCIAL

## 2019-01-04 VITALS
SYSTOLIC BLOOD PRESSURE: 106 MMHG | HEART RATE: 59 BPM | OXYGEN SATURATION: 97 % | RESPIRATION RATE: 16 BRPM | DIASTOLIC BLOOD PRESSURE: 71 MMHG | TEMPERATURE: 98.8 F

## 2019-01-04 DIAGNOSIS — Z79.4 TYPE 2 DIABETES MELLITUS WITHOUT COMPLICATION, WITH LONG-TERM CURRENT USE OF INSULIN (H): ICD-10-CM

## 2019-01-04 DIAGNOSIS — E11.9 TYPE 2 DIABETES MELLITUS WITHOUT COMPLICATION, WITH LONG-TERM CURRENT USE OF INSULIN (H): ICD-10-CM

## 2019-01-04 DIAGNOSIS — E78.2 MIXED HYPERLIPIDEMIA: ICD-10-CM

## 2019-01-04 DIAGNOSIS — R94.39 ABNORMAL STRESS TEST: ICD-10-CM

## 2019-01-04 DIAGNOSIS — R07.89 CHEST DISCOMFORT: ICD-10-CM

## 2019-01-04 DIAGNOSIS — I10 BENIGN ESSENTIAL HYPERTENSION: ICD-10-CM

## 2019-01-04 LAB
CREAT SERPL-MCNC: 0.58 MG/DL (ref 0.52–1.04)
GFR SERPL CREATININE-BSD FRML MDRD: >90 ML/MIN/{1.73_M2}

## 2019-01-04 PROCEDURE — 82565 ASSAY OF CREATININE: CPT | Performed by: INTERNAL MEDICINE

## 2019-01-04 PROCEDURE — 75574 CT ANGIO HRT W/3D IMAGE: CPT | Mod: TC

## 2019-01-04 PROCEDURE — 25000132 ZZH RX MED GY IP 250 OP 250 PS 637

## 2019-01-04 PROCEDURE — 36415 COLL VENOUS BLD VENIPUNCTURE: CPT | Performed by: INTERNAL MEDICINE

## 2019-01-04 PROCEDURE — 25000128 H RX IP 250 OP 636: Performed by: RADIOLOGY

## 2019-01-04 RX ORDER — NITROGLYCERIN 0.4 MG/1
TABLET SUBLINGUAL
Status: COMPLETED
Start: 2019-01-04 | End: 2019-01-04

## 2019-01-04 RX ORDER — METOPROLOL TARTRATE 50 MG
50-100 TABLET ORAL
Status: DISCONTINUED | OUTPATIENT
Start: 2019-01-04 | End: 2019-01-05 | Stop reason: HOSPADM

## 2019-01-04 RX ORDER — ACYCLOVIR 200 MG/1
0-1 CAPSULE ORAL
Status: DISCONTINUED | OUTPATIENT
Start: 2019-01-04 | End: 2019-01-05 | Stop reason: HOSPADM

## 2019-01-04 RX ORDER — METOPROLOL TARTRATE 1 MG/ML
5-15 INJECTION, SOLUTION INTRAVENOUS
Status: DISCONTINUED | OUTPATIENT
Start: 2019-01-04 | End: 2019-01-05 | Stop reason: HOSPADM

## 2019-01-04 RX ORDER — IOPAMIDOL 755 MG/ML
75 INJECTION, SOLUTION INTRAVASCULAR ONCE
Status: COMPLETED | OUTPATIENT
Start: 2019-01-04 | End: 2019-01-04

## 2019-01-04 RX ORDER — NITROGLYCERIN 0.4 MG/1
0.4 TABLET SUBLINGUAL
Status: DISCONTINUED | OUTPATIENT
Start: 2019-01-04 | End: 2019-01-05 | Stop reason: HOSPADM

## 2019-01-04 RX ADMIN — NITROGLYCERIN 0.4 MG: 0.4 TABLET SUBLINGUAL at 09:30

## 2019-01-04 RX ADMIN — IOPAMIDOL 75 ML: 755 INJECTION, SOLUTION INTRAVENOUS at 09:39

## 2019-01-04 NOTE — PROGRESS NOTES
Pt back into SDS from CT, denies symptoms of Headache, dizzines,  BP and HR remain unchanged from admission.

## 2019-01-14 DIAGNOSIS — Z12.39 SCREENING BREAST EXAMINATION: Primary | ICD-10-CM

## 2019-01-16 DIAGNOSIS — E78.2 MIXED HYPERLIPIDEMIA: Primary | ICD-10-CM

## 2019-01-16 RX ORDER — SIMVASTATIN 40 MG
TABLET ORAL
Qty: 90 TABLET | Refills: 0 | Status: SHIPPED | OUTPATIENT
Start: 2019-01-16 | End: 2019-04-12

## 2019-01-16 NOTE — TELEPHONE ENCOUNTER
Historical med.    SIMVASTATIN PO     Last Written Prescription Date:    Last Fill Quantity: ,   # refills:   Last Office Visit: 10/24/18  Future Office visit:       Routing refill request to provider for review/approval because:  Drug not on the G, P or [facility] refill protocol or controlled substance

## 2019-03-15 DIAGNOSIS — F34.1 DYSTHYMIA: ICD-10-CM

## 2019-03-15 NOTE — TELEPHONE ENCOUNTER
venlafaxine (EFFEXOR-XR) 75 MG 24 hr capsule    Last Written Prescription Date:  12/20/18  Last Fill Quantity: 180,   # refills: 0  Last Office Visit: 10/24/18  Future Office visit:    Next 5 appointments (look out 90 days)    May 07, 2019  8:30 AM CDT  (Arrive by 8:15 AM)  PHYSICAL with Maryjane Lyon MD  [facility] ([facility] ) [address]  [place] [address]  [phone]

## 2019-03-18 RX ORDER — VENLAFAXINE HYDROCHLORIDE 75 MG/1
CAPSULE, EXTENDED RELEASE ORAL
Qty: 180 CAPSULE | Refills: 0 | Status: SHIPPED | OUTPATIENT
Start: 2019-03-18 | End: 2019-06-11

## 2019-04-02 ENCOUNTER — OFFICE VISIT (OUTPATIENT)
Dept: FAMILY MEDICINE | Facility: OTHER | Age: 59
End: 2019-04-02
Attending: FAMILY MEDICINE
Payer: COMMERCIAL

## 2019-04-02 ENCOUNTER — NURSE TRIAGE (OUTPATIENT)
Dept: FAMILY MEDICINE | Facility: OTHER | Age: 59
End: 2019-04-02

## 2019-04-02 ENCOUNTER — TELEPHONE (OUTPATIENT)
Dept: FAMILY MEDICINE | Facility: OTHER | Age: 59
End: 2019-04-02

## 2019-04-02 VITALS
SYSTOLIC BLOOD PRESSURE: 138 MMHG | WEIGHT: 183 LBS | BODY MASS INDEX: 33.47 KG/M2 | HEART RATE: 76 BPM | DIASTOLIC BLOOD PRESSURE: 80 MMHG | OXYGEN SATURATION: 96 %

## 2019-04-02 DIAGNOSIS — R04.0 EPISTAXIS: Primary | ICD-10-CM

## 2019-04-02 PROCEDURE — 99213 OFFICE O/P EST LOW 20 MIN: CPT | Performed by: FAMILY MEDICINE

## 2019-04-02 ASSESSMENT — PAIN SCALES - GENERAL: PAINLEVEL: NO PAIN (0)

## 2019-04-02 NOTE — NURSING NOTE
"Chief Complaint   Patient presents with     Sinus Problem       Initial /80   Pulse 76   Wt 83 kg (183 lb)   SpO2 96%   BMI 33.47 kg/m   Estimated body mass index is 33.47 kg/m  as calculated from the following:    Height as of 12/17/18: 1.575 m (5' 2\").    Weight as of this encounter: 83 kg (183 lb).  Medication Reconciliation: complete    Diane Mccall LPN  "

## 2019-04-02 NOTE — TELEPHONE ENCOUNTER
"Patient scheduled today with PCP.    Next 5 appointments (look out 90 days)    Apr 02, 2019  2:30 PM CDT  (Arrive by 2:15 PM)  SHORT with Nathanael Mena MD  [facility] ([facility] ) [address] [place] [address]  [phone]          Reason for Disposition    [1] Bleeding recurs 3 or more times in 24 hours AND [2] direct pressure applied correctly    Additional Information    Negative: Fainted or too weak to stand following large blood loss    Negative: Sounds like a life-threatening emergency to the triager    Negative: Nosebleed followed a nose injury    Negative: [1] Bleeding present > 30 minutes AND [2] using correct method of direct pressure    Negative: [1] Bleeding now AND [2] second call after being instructed in correct technique of direct pressure    Negative: Dizziness or lightheadedness    Negative: Pale skin (pallor) of new onset or worsening    Negative: [1] Has nasal packing (inserted by health care provider to control bleeding) AND [2] new rash    Negative: [1] Has nasal packing AND [2] now bleeding around the packing (Exception: few drops or ooze)    Negative: Patient sounds very sick or weak to the triager    Negative: [1] Large amount of blood has been lost (e.g., 1 cup or 240 ml) AND [2] bleeding now controlled (stopped)    Negative: [1] Skin bruises or bleeding gums AND [2] not caused by an injury    Negative: [1] Has nasal packing AND [2] fever > 100.5 F (38.1 C)    Negative: Taking Coumadin (warfarin) or other strong blood thinner, or known bleeding disorder (e.g., thrombocytopenia)    Answer Assessment - Initial Assessment Questions  1. AMOUNT OF BLEEDING: \"How bad is the bleeding?\" \"How much blood was lost?\" \"Has the bleeding stopped?\"    - MILD: needed a couple tissues    - MODERATE: needed many tissues    - SEVERE: large blood clots, soaked many tissues, lasted more than 30 minutes       Mild bleeding  2. ONSET: \"When did the " "nosebleed start?\"       One started while on the phone with patient  3. FREQUENCY: \"How many nosebleeds have you had in the last 24 hours?\"       4 within last 24 hours  4. RECURRENT SYMPTOMS: \"Have there been other recent nosebleeds?\" If so, ask: \"How long did it take you to stop the bleeding?\" \"What worked best?\"       No other recent.  Pressure will stop  5. CAUSE: \"What do you think caused this nosebleed?\"      Sinus issues  6. LOCAL FACTORS: \"Do you have any cold symptoms?\", \"Have you been rubbing or picking at your nose?\"      No congestion, mild headache and sinus drainage over the last month  7. SYSTEMIC FACTORS: \"Do you have high blood pressure or any bleeding problems?\"      No  8. BLOOD THINNERS: \"Do you take any blood thinners?\" (e.g., coumadin, heparin, aspirin, Plavix)      Daily 81 mg ASA  9. OTHER SYMPTOMS: \"Do you have any other symptoms?\" (e.g., lightheadedness)      No, denies light headedness, SOB  10. PREGNANCY: \"Is there any chance you are pregnant?\" \"When was your last menstrual period?\"        NO    Protocols used: NOSEBLEED-ADULT-AH      "

## 2019-04-02 NOTE — PROGRESS NOTES
SUBJECTIVE:                                                    Suzy Gallardo is a 58 year old female who presents to clinic today for the following health issues:    RESPIRATORY SYMPTOMS      Duration: 1 week    Description  nasal congestion, fatigue/malaise     Severity: moderate    Accompanying signs and symptoms: None    History (predisposing factors):  none    Precipitating or alleviating factors: None    Therapies tried and outcome:  none      PROBLEMS TO ADD ON...    Problem list and histories reviewed & adjusted, as indicated.  Additional history:  Minimal sinus like sx.  Overall stable.  Biggest issue is nose bleeds.  Having huge clots.  Sometimes kind of choking on them.  Sometimes out the front of the nose.  4 times now this has happened.  It seems like a lot of blood.      Patient Active Problem List   Diagnosis     [place] disease      Cellulitis     Annual physical exam     Morbid obesity (H)     Disorder of lipoid metabolism     Status post hysterectomy     ACP (advance care planning)     Type 2 diabetes mellitus without complication, with long-term current use of insulin (H)     Depression     Mixed hyperlipidemia     Elevated liver function tests     Abnormal stress test     Chest discomfort     Benign essential hypertension     Hepatic steatosis     Anxiety     Past Surgical History:   Procedure Laterality Date     BREAST SURGERY      breast biopsy     COLONOSCOPY  5/3/2013    Procedure: COLONOSCOPY;  COLONOSCOPY;  Surgeon: Pablito Urbina MD;  Location: [facility] OR     ENDOMETRIAL SAMPLING (BIOPSY)       HYSTERECTOMY, CERVIX STATUS UNKNOWN       LAPAROSCOPY       NOSE SURGERY       wisdom teeth removed         Social History     Tobacco Use     Smoking status: Never Smoker     Smokeless tobacco: Never Used   Substance Use Topics     Alcohol use: No     Family History   Problem Relation Age of Onset     C.A.D. Father      Cancer Brother         gastric (cause of death)      Hypertension Other       "    Current Outpatient Medications   Medication Sig Dispense Refill     ASPIRIN PO Take 81 mg by mouth daily        Blood Glucose Monitoring Suppl (ONE TOUCH ULTRA) 1 each strip TID AC       calcium-vitamin D (CALTRATE) 600-400 MG-UNIT per tablet Take 1 tablet by mouth daily.       furosemide (LASIX) 40 MG tablet TAKE 1 TABLET (40 MG) BY MOUTH 2 TIMES DAILY 180 tablet 1     IBUPROFEN PO        insulin glargine (LANTUS SOLOSTAR) 100 UNIT/ML injection Inject 30 Units Subcutaneous At Bedtime        Insulin Pen Needle (BD U/F III SHORT PEN NEEDLE) See instructions.       Insulin Pen Needle (PEN NEEDLES 5/16\") 31G X 8 MM MISC        liraglutide (VICTOZA) 18 MG/3ML SOLN Inject 1.8 mg Subcutaneous daily        MetFORMIN HCl (GLUCOPHAGE PO) Take 1,000 mg by mouth 2 times daily (with meals).       metoprolol tartrate (LOPRESSOR) 50 MG tablet Take 1 tablet (50 mg) by mouth as needed (Take 1 tablet the evening prior to the CT scan and the morning prior to the CT scan.) 2 tablet 0     multivitamin, therapeutic with minerals (THERA-VIT-M) TABS Take 1 tablet by mouth daily.       simvastatin (ZOCOR) 40 MG tablet TAKE 1 TABLET BY MOUTH ONCE DAILY 90 tablet 0     SIMVASTATIN PO Take 40 mg by mouth daily.       venlafaxine (EFFEXOR-XR) 75 MG 24 hr capsule TAKE TWO CAPSULES BY MOUTH DAILY. 180 capsule 0     No Known Allergies    ROS:  Constitutional, HEENT, cardiovascular, pulmonary, gi and gu systems are negative, except as otherwise noted.    OBJECTIVE:                                                    /80   Pulse 76   Wt 83 kg (183 lb)   SpO2 96%   BMI 33.47 kg/m    Body mass index is 33.47 kg/m .  GENERAL APPEARANCE: Alert, no acute distress  HEENT:  Nose some dried blood anteriorly but I don't see a defect in the skin.  I can't see an obvious source of bleeding.    SKIN: no suspicious lesions or rashes to visualized skin  NEURO: Alert, oriented x 3, speech and mentation normal           ASSESSMENT/PLAN:                "                                     1. Epistaxis  Reviewed.  I suspect posterior bleeding.  Asking ENT to see.  Suzy is a reliable  of sx and the amount of blood and large clot sounds significant.  ENT soon, and ER if worse sooner.    - OTOLARYNGOLOGY REFERRAL          Nathanael Mena MD  [facility]

## 2019-04-03 ENCOUNTER — OFFICE VISIT (OUTPATIENT)
Dept: OTOLARYNGOLOGY | Facility: OTHER | Age: 59
End: 2019-04-03
Attending: FAMILY MEDICINE
Payer: COMMERCIAL

## 2019-04-03 VITALS
WEIGHT: 183 LBS | HEART RATE: 80 BPM | SYSTOLIC BLOOD PRESSURE: 142 MMHG | DIASTOLIC BLOOD PRESSURE: 78 MMHG | BODY MASS INDEX: 33.47 KG/M2 | TEMPERATURE: 98.4 F | OXYGEN SATURATION: 98 %

## 2019-04-03 DIAGNOSIS — R04.0 EPISTAXIS: ICD-10-CM

## 2019-04-03 DIAGNOSIS — J34.89 SINUS PRESSURE: ICD-10-CM

## 2019-04-03 DIAGNOSIS — J34.89 DRAINAGE FROM NOSE: Primary | ICD-10-CM

## 2019-04-03 LAB
GRAM STN SPEC: ABNORMAL
SPECIMEN SOURCE: ABNORMAL

## 2019-04-03 PROCEDURE — 87205 SMEAR GRAM STAIN: CPT | Performed by: NURSE PRACTITIONER

## 2019-04-03 PROCEDURE — 87102 FUNGUS ISOLATION CULTURE: CPT | Mod: 90 | Performed by: NURSE PRACTITIONER

## 2019-04-03 PROCEDURE — 31237 NSL/SINS NDSC SURG BX POLYPC: CPT | Mod: 50 | Performed by: NURSE PRACTITIONER

## 2019-04-03 PROCEDURE — 99214 OFFICE O/P EST MOD 30 MIN: CPT | Mod: 25 | Performed by: NURSE PRACTITIONER

## 2019-04-03 PROCEDURE — 87070 CULTURE OTHR SPECIMN AEROBIC: CPT | Performed by: NURSE PRACTITIONER

## 2019-04-03 PROCEDURE — 99000 SPECIMEN HANDLING OFFICE-LAB: CPT | Performed by: NURSE PRACTITIONER

## 2019-04-03 ASSESSMENT — PAIN SCALES - GENERAL: PAINLEVEL: NO PAIN (0)

## 2019-04-03 NOTE — PROGRESS NOTES
afrinOtolaryngology Note         Chief Complaint:     Patient presents with:  Epistaxis: had nose bleed on Sun night- lasting 125-20 min         History of Present Illness:     Suzy Gallardo is a 58 year old female here today with concerns regarding recent episode of epistaxis.  She was sent here by her Nathanael Mena.      Prior to this nose bleed, she had 1 month of thick/purulent nasal drainage with no sinus pain or pressure (so never went in as she typically gets different symptoms with a sinus infection). She does states she has felt mild improvement in her sinus symptoms over the past couple days. She has been using Flonase routinely recently for her sinus sx.    3 days ago, she had a nose bleed that lasted almost 20 minutes. Has now been getting epistaxis 2-3 times/day since then, last episode 8 pm last night. Typically lasting 15 minutes and from the right side. Blood typically out the front, but felt something like it loosened up in her throat last night, thinking it was a blood clot. Denies dysphagia, SOB.   Prior to this, no other history of epistaxis.     Suzy denies any prior nasal trauma or nasal surgery.  She also denies using any sprays intranasally or other inhaled drugs. The nose has never been cauterized. She has never had to have her nose packed.   There is no personal or family history of bleeding abnormalities. She does have HTN which is controlled. Stopped baby aspirin last night. There has been no abnormal bleeding from any other body orifice.        Denies feeling dizzy/weak, but has felt a little more fatigued (but feels this has been going on since New Years).  Not trying anything currently for her symptoms.    She does take daily baby aspirin.          Medications:     Current Outpatient Rx   Medication Sig Dispense Refill     ASPIRIN PO Take 81 mg by mouth daily        Blood Glucose Monitoring Suppl (ONE TOUCH ULTRA) 1 each strip TID AC       calcium-vitamin D (CALTRATE) 600-400  "MG-UNIT per tablet Take 1 tablet by mouth daily.       furosemide (LASIX) 40 MG tablet TAKE 1 TABLET (40 MG) BY MOUTH 2 TIMES DAILY 180 tablet 1     IBUPROFEN PO        insulin glargine (LANTUS SOLOSTAR) 100 UNIT/ML injection Inject 30 Units Subcutaneous At Bedtime        Insulin Pen Needle (BD U/F III SHORT PEN NEEDLE) See instructions.       Insulin Pen Needle (PEN NEEDLES 5/16\") 31G X 8 MM MISC        liraglutide (VICTOZA) 18 MG/3ML SOLN Inject 1.8 mg Subcutaneous daily        MetFORMIN HCl (GLUCOPHAGE PO) Take 1,000 mg by mouth 2 times daily (with meals).       metoprolol tartrate (LOPRESSOR) 50 MG tablet Take 1 tablet (50 mg) by mouth as needed (Take 1 tablet the evening prior to the CT scan and the morning prior to the CT scan.) 2 tablet 0     multivitamin, therapeutic with minerals (THERA-VIT-M) TABS Take 1 tablet by mouth daily.       simvastatin (ZOCOR) 40 MG tablet TAKE 1 TABLET BY MOUTH ONCE DAILY 90 tablet 0     SIMVASTATIN PO Take 40 mg by mouth daily.       venlafaxine (EFFEXOR-XR) 75 MG 24 hr capsule TAKE TWO CAPSULES BY MOUTH DAILY. 180 capsule 0            Allergies:     Allergies: Patient has no known allergies.          Past Medical History:     Past Medical History:   Diagnosis Date     Anemia, unspecified 06/23/2004    Lali Monroe MD      Cellulitis and abscess of hand, except fingers and thumb 01/11/2002     Cellulitis and abscess of unspecified site 02/22/2002    Jaime Rodarte MD      Depressive disorder, not elsewhere classified 01/19/2004     Diabetes mellitus (H)      Diabetes mellitus without mention of complication, 11/30/2001     Endometriosis of uterus 12/19/2001     Follow-up examination, following unspecified surgery 10/02/2001     Ingrowing nail 03/21/2000     Metrorrhagia 07/08/2004     [place] disease  03/01/2012    Nathanael Mena MD      Need for prophylactic vaccination and inoculation against influenza 11/06/2003     Other (abnormal) findings on radiological " examination of breast 08/15/2002    Alfonso Patton MD      Other screening mammogram 07/25/2000     Pure hypercholesterolemia 11/26/1999    Cristian Bradley MD      Pure hyperglyceridemia 01/11/2000    Cristian Bradley MD      Routine general medical examination at a health care facility 04/07/2003     Sterilization 12/04/2001     Unspecified symptom associated with female genital organs 10/30/2001     Vaginitis and vulvovaginitis, unspecified 11/21/2001            Past Surgical History:     Past Surgical History:   Procedure Laterality Date     BREAST SURGERY      breast biopsy     COLONOSCOPY  5/3/2013    Procedure: COLONOSCOPY;  COLONOSCOPY;  Surgeon: Pablito Urbina MD;  Location: [facility]     ENDOMETRIAL SAMPLING (BIOPSY)       HYSTERECTOMY, CERVIX STATUS UNKNOWN       LAPAROSCOPY       NOSE SURGERY       wisdom teeth removed         ENT family history reviewed         Social History:     Social History     Tobacco Use     Smoking status: Never Smoker     Smokeless tobacco: Never Used   Substance Use Topics     Alcohol use: No     Drug use: No            Review of Systems:     ROS: See HPI         Physical Exam:     /78 (BP Location: Right arm, Patient Position: Sitting)   Pulse 80   Temp 98.4  F (36.9  C) (Tympanic)   Wt 83 kg (183 lb)   SpO2 98%   BMI 33.47 kg/m    General - The patient is well nourished and well developed, and appears to have good nutritional status.  Alert and oriented to person and place, answers questions and cooperates with examination appropriately.   Head and Face - Normocephalic and atraumatic, with no gross asymmetry noted.  The facial nerve is intact, with strong symmetric movements.  Voice and Breathing - The patient was breathing comfortably without the use of accessory muscles. There was no wheezing, stridor. The patients voice was clear and strong, and had appropriate pitch and quality.  Ears - External ear normal. Canals are patent. Right tympanic membrane is intact  without effusion, retraction or mass. Left tympanic membrane is intact without effusion, retraction or mass.  Eyes - Extraocular movements intact, and the pupils were reactive to light. Sclera were not icteric or injected, conjunctiva were pink and moist.  Mouth - Examination of the oral cavity showed pink, healthy oral mucosa. Dentition in good condition. No lesions or ulcerations noted. The tongue was mobile and midline.   Throat - The walls of the oropharynx were smooth, pink, moist, symmetric, and had no lesions or ulcerations.  The tonsillar pillars and soft palate were symmetric. The uvula was midline on elevation.  No blood in oropharynx.  Neck - Normal midline excursion of the laryngotracheal complex during swallowing.  Full range of motion on passive movement.  Palpation of the occipital, submental, submandibular, internal jugular chain, and supraclavicular nodes did not demonstrate any abnormal lymph nodes or masses.  Palpation of the thyroid was soft and smooth, with no nodules or goiter appreciated.  The trachea was mobile and midline.  Nose - External contour is symmetric, no gross deflection or scars.  Nasal mucosa is pink and moist with no abnormal mucus. No polyps, masses, or purulence noted on examination.    PROCEDURES  To evaluate the nose and sinuses , I performed flexible laryngoscopyI sprayed both nares with lidocaine and neosynephrine.     I began with the LEFT side  and then similarly examined the RIGHT side    Findings:  Inferior turbinates: Intact  Middle turbinate and middle meatus:No polyposis. Scant purulent drainage at face of left MT and just lateral to it, collected for culture using 7 hernandez.   Mucosa is healthy throughout,  no polyps nor polypoid degeneration  Mid nasopharyngeal wall is a small amount of clear debris with small area of tinge of red in. I am unable to get this to clear and cannot assess mucosa underneath it. Surrounding mucosa is intact with no hypervascularity or  any old blood. No active bleeding.   Oropharynx is with no noted blood.    View of the remaining laryngopharynx revealed no asymmetry/ulcers or lesions with no findings of active bleeding or blood clots.          Assessment and Plan:       ICD-10-CM    1. Drainage from nose J34.89 Fungus Culture, non-blood     Gram stain (KOH)     Sinus Culture Aerobic Bacterial   2. Epistaxis R04.0    3. Sinus pressure J34.89      Will call with culture of sinus drainage once available.  If comes back with positive growth of bacteria, would only recommend if she has ongoing symptoms of sinus infection, otherwise recommend OTC tx.    I could not find obvious source of bleeding in nares.  I did see questionable area mid nasopharyngeal posterior wall, which we could not get debris to come off. Mostly clear drainage type material with blood tinged area in center, no active bleeding.     Stop Flonase!  Use Afrin to both nostrils daily as directed for 3 days.  Keep BP controlled.  May need to hold aspirin of ongoing epistaxis.     Ocean nasal spray 2 sprays each nostril 3-4 times daily with insertion of pea sized amount of Aquaphor bilateral nostrils at night.     Can use Afrin PRN if any bleeding as directed in discharge instructions, but not > 3 days in a row.    Encouraged her to return in 2 weeks for re-evaluation of area on nasopharynx, but instructed to follow-up sooner as needed.         Tessie Ulloa NP  ENT  [facility], [place]  [phone]

## 2019-04-03 NOTE — PATIENT INSTRUCTIONS
Epistaxis Prevention  ~ For the next 7-10 days.......    Keep your head above the level of the heart at all times for the next week.      No picking or rubbing at the cauterized side of the nose    No nose blowing for 1 week    No lifting repetitive bending or straining for 1 week     Intermittent lifting is OK, but follow 10 pound weight limit    Sneeze with mouth open    Ocean Nasal Spray (or similar nasal saline mist): Spray 1-2 sprays into each nostril 3-4 times daily 3-4 times every day    Aquaphor Ointment (medical grade Vaseline): Every night, put a pea sized amount inside the beginning of each nostril, then gently massage the outside of the nostril so that the ointment is spread around.    Complete your oral antibiotic only if you have been given one    ~~The above prevention plan should be followed even if you have packing in place!!!~~    What if there is any further bleeding?    Apply pressure to front of nose, lean forward and spit out blood.      Afrin nasal spray can be used to side of bleed until it slows or stops by either spraying 3-5 sprays directly into nostril or if bleeding is coming from front of nostril, can soak small cotton bal/padl of Afrin and gently insert it into the nostril and remove it after 10 minutes. Either treatment can be repeated in 10 minutes if recurrent bleeding.    If ongoing bleeding persists, present to the Emergency Department    Ongoing prevention of nose bleeds:    Ocean nasal spray (over the counter): Spray 1-2 sprays into each nostril 3-4 times daily 3-4 times every day    Aquaphor (medical grade Vaseline): Every night, put a pea sized amount inside the beginning of each nostril, then gently massage the outside of the nostril so that the ointment is spread around.    Use as needed Afrin nasal spray as directed above if heavy bleeding and if unable to be controlled at home, go to Emergency Room    Limit use of NSAIDS if able (Ibuprofen, Aleve, etc.)    Keep good  control of blood pressure, as elevated blood pressure can cause nose bleeds      Will call you with sinus culture results.  Afrin daily to both nostrils x 3 days.    Return to ENT as needed.    Thank you for allowing Tessie Ulloa CNP and our ENT team to participate in your care.  If your medications are too expensive or if there are any questions or concerns with your medication, please give the nurse a call.  If you have a scheduling or an appointment question please contact our Ear/Nose/Throat/Allergy Health Unit Coordinator at their direct line [phone].   ALL nursing questions or concerns can be directed to your ENT nurse at: [phone]- Maryes

## 2019-04-03 NOTE — LETTER
4/3/2019         RE: Suzy Gallardo  [address] [facility] [address]        Dear Colleague,    Thank you for referring your patient, Suzy Gallardo, to [facility]. Please see a copy of my visit note below.    afrinOtolaryngology Note         Chief Complaint:     Patient presents with:  Epistaxis: had nose bleed on Sun night- lasting 125-20 min         History of Present Illness:     Suzy Gallardo is a 58 year old female here today with concerns regarding recent episode of epistaxis.  She was sent here by her Nathanael Mena.      Prior to this nose bleed, she had 1 month of thick/purulent nasal drainage with no sinus pain or pressure (so never went in as she typically gets different symptoms with a sinus infection). She does states she has felt mild improvement in her sinus symptoms over the past couple days. She has been using Flonase routinely recently for her sinus sx.    3 days ago, she had a nose bleed that lasted almost 20 minutes. Has now been getting epistaxis 2-3 times/day since then, last episode 8 pm last night. Typically lasting 15 minutes and from the right side. Blood typically out the front, but felt something like it loosened up in her throat last night, thinking it was a blood clot. Denies dysphagia, SOB.   Prior to this, no other history of epistaxis.     Suzy denies any prior nasal trauma or nasal surgery.  She also denies using any sprays intranasally or other inhaled drugs. The nose has never been cauterized. She has never had to have her nose packed.   There is no personal or family history of bleeding abnormalities. She does have HTN which is controlled. Stopped baby aspirin last night. There has been no abnormal bleeding from any other body orifice.        Denies feeling dizzy/weak, but has felt a little more fatigued (but feels this has been going on since New Years).  Not trying anything currently for her symptoms.    She does take daily baby aspirin.  "         Medications:     Current Outpatient Rx   Medication Sig Dispense Refill     ASPIRIN PO Take 81 mg by mouth daily        Blood Glucose Monitoring Suppl (ONE TOUCH ULTRA) 1 each strip TID AC       calcium-vitamin D (CALTRATE) 600-400 MG-UNIT per tablet Take 1 tablet by mouth daily.       furosemide (LASIX) 40 MG tablet TAKE 1 TABLET (40 MG) BY MOUTH 2 TIMES DAILY 180 tablet 1     IBUPROFEN PO        insulin glargine (LANTUS SOLOSTAR) 100 UNIT/ML injection Inject 30 Units Subcutaneous At Bedtime        Insulin Pen Needle (BD U/F III SHORT PEN NEEDLE) See instructions.       Insulin Pen Needle (PEN NEEDLES 5/16\") 31G X 8 MM MISC        liraglutide (VICTOZA) 18 MG/3ML SOLN Inject 1.8 mg Subcutaneous daily        MetFORMIN HCl (GLUCOPHAGE PO) Take 1,000 mg by mouth 2 times daily (with meals).       metoprolol tartrate (LOPRESSOR) 50 MG tablet Take 1 tablet (50 mg) by mouth as needed (Take 1 tablet the evening prior to the CT scan and the morning prior to the CT scan.) 2 tablet 0     multivitamin, therapeutic with minerals (THERA-VIT-M) TABS Take 1 tablet by mouth daily.       simvastatin (ZOCOR) 40 MG tablet TAKE 1 TABLET BY MOUTH ONCE DAILY 90 tablet 0     SIMVASTATIN PO Take 40 mg by mouth daily.       venlafaxine (EFFEXOR-XR) 75 MG 24 hr capsule TAKE TWO CAPSULES BY MOUTH DAILY. 180 capsule 0            Allergies:     Allergies: Patient has no known allergies.          Past Medical History:     Past Medical History:   Diagnosis Date     Anemia, unspecified 06/23/2004    Lali Monroe MD      Cellulitis and abscess of hand, except fingers and thumb 01/11/2002     Cellulitis and abscess of unspecified site 02/22/2002    Jaime Rodarte MD      Depressive disorder, not elsewhere classified 01/19/2004     Diabetes mellitus (H)      Diabetes mellitus without mention of complication, 11/30/2001     Endometriosis of uterus 12/19/2001     Follow-up examination, following unspecified surgery 10/02/2001     " Ingrowing nail 03/21/2000     Metrorrhagia 07/08/2004     [place] disease  03/01/2012    Nathanael Mena MD      Need for prophylactic vaccination and inoculation against influenza 11/06/2003     Other (abnormal) findings on radiological examination of breast 08/15/2002    Alfonso Patton MD      Other screening mammogram 07/25/2000     Pure hypercholesterolemia 11/26/1999    Cristian Bradley MD      Pure hyperglyceridemia 01/11/2000    Cristian Bradley MD      Routine general medical examination at a health care facility 04/07/2003     Sterilization 12/04/2001     Unspecified symptom associated with female genital organs 10/30/2001     Vaginitis and vulvovaginitis, unspecified 11/21/2001            Past Surgical History:     Past Surgical History:   Procedure Laterality Date     BREAST SURGERY      breast biopsy     COLONOSCOPY  5/3/2013    Procedure: COLONOSCOPY;  COLONOSCOPY;  Surgeon: Pablito Urbina MD;  Location: [facility]     ENDOMETRIAL SAMPLING (BIOPSY)       HYSTERECTOMY, CERVIX STATUS UNKNOWN       LAPAROSCOPY       NOSE SURGERY       wisdom teeth removed         ENT family history reviewed         Social History:     Social History     Tobacco Use     Smoking status: Never Smoker     Smokeless tobacco: Never Used   Substance Use Topics     Alcohol use: No     Drug use: No            Review of Systems:     ROS: See HPI         Physical Exam:     /78 (BP Location: Right arm, Patient Position: Sitting)   Pulse 80   Temp 98.4  F (36.9  C) (Tympanic)   Wt 83 kg (183 lb)   SpO2 98%   BMI 33.47 kg/m     General - The patient is well nourished and well developed, and appears to have good nutritional status.  Alert and oriented to person and place, answers questions and cooperates with examination appropriately.   Head and Face - Normocephalic and atraumatic, with no gross asymmetry noted.  The facial nerve is intact, with strong symmetric movements.  Voice and Breathing - The patient was breathing  comfortably without the use of accessory muscles. There was no wheezing, stridor. The patients voice was clear and strong, and had appropriate pitch and quality.  Ears - External ear normal. Canals are patent. Right tympanic membrane is intact without effusion, retraction or mass. Left tympanic membrane is intact without effusion, retraction or mass.  Eyes - Extraocular movements intact, and the pupils were reactive to light. Sclera were not icteric or injected, conjunctiva were pink and moist.  Mouth - Examination of the oral cavity showed pink, healthy oral mucosa. Dentition in good condition. No lesions or ulcerations noted. The tongue was mobile and midline.   Throat - The walls of the oropharynx were smooth, pink, moist, symmetric, and had no lesions or ulcerations.  The tonsillar pillars and soft palate were symmetric. The uvula was midline on elevation.  No blood in oropharynx.  Neck - Normal midline excursion of the laryngotracheal complex during swallowing.  Full range of motion on passive movement.  Palpation of the occipital, submental, submandibular, internal jugular chain, and supraclavicular nodes did not demonstrate any abnormal lymph nodes or masses.  Palpation of the thyroid was soft and smooth, with no nodules or goiter appreciated.  The trachea was mobile and midline.  Nose - External contour is symmetric, no gross deflection or scars.  Nasal mucosa is pink and moist with no abnormal mucus. No polyps, masses, or purulence noted on examination.    PROCEDURES  To evaluate the nose and sinuses , I performed flexible laryngoscopyI sprayed both nares with lidocaine and neosynephrine.     I began with the LEFT side  and then similarly examined the RIGHT side    Findings:  Inferior turbinates: Intact  Middle turbinate and middle meatus:No polyposis. Scant purulent drainage at face of left MT and just lateral to it, collected for culture using 7 hernandez.   Mucosa is healthy throughout,  no polyps nor  polypoid degeneration  Mid nasopharyngeal wall is a small amount of clear debris with small area of tinge of red in. I am unable to get this to clear and cannot assess mucosa underneath it. Surrounding mucosa is intact with no hypervascularity or any old blood. No active bleeding.   Oropharynx is with no noted blood.    View of the remaining laryngopharynx revealed no asymmetry/ulcers or lesions with no findings of active bleeding or blood clots.          Assessment and Plan:       ICD-10-CM    1. Drainage from nose J34.89 Fungus Culture, non-blood     Gram stain (KOH)     Sinus Culture Aerobic Bacterial   2. Epistaxis R04.0    3. Sinus pressure J34.89      Will call with culture of sinus drainage once available.  If comes back with positive growth of bacteria, would only recommend if she has ongoing symptoms of sinus infection, otherwise recommend OTC tx.    I could not find obvious source of bleeding in nares.  I did see questionable area mid nasopharyngeal posterior wall, which we could not get debris to come off. Mostly clear drainage type material with blood tinged area in center, no active bleeding.     Stop Flonase!  Use Afrin to both nostrils daily as directed for 3 days.  Keep BP controlled.  May need to hold aspirin of ongoing epistaxis.     Ocean nasal spray 2 sprays each nostril 3-4 times daily with insertion of pea sized amount of Aquaphor bilateral nostrils at night.     Can use Afrin PRN if any bleeding as directed in discharge instructions, but not > 3 days in a row.    Encouraged her to return in 2 weeks for re-evaluation of area on nasopharynx, but instructed to follow-up sooner as needed.         Tessie Ulloa, NP  ENT  [facility], [place]  [phone]      [id number]    Again, thank you for allowing me to participate in the care of your patient.        Sincerely,        Tessie Ulloa, YUKI CNP

## 2019-04-03 NOTE — NURSING NOTE
"Chief Complaint   Patient presents with     Epistaxis     had nose bleed on Sun night- lasting 125-20 min       Initial /78 (BP Location: Right arm, Patient Position: Sitting)   Pulse 80   Temp 98.4  F (36.9  C) (Tympanic)   Wt 83 kg (183 lb)   SpO2 98%   BMI 33.47 kg/m   Estimated body mass index is 33.47 kg/m  as calculated from the following:    Height as of 12/17/18: 1.575 m (5' 2\").    Weight as of this encounter: 83 kg (183 lb).  Medication Reconciliation: complete    Ana Laura Duran LPN  "

## 2019-04-04 LAB
BACTERIA SPEC CULT: ABNORMAL
SPECIMEN SOURCE: ABNORMAL

## 2019-04-05 DIAGNOSIS — J01.90 ACUTE SINUSITIS, RECURRENCE NOT SPECIFIED, UNSPECIFIED LOCATION: Primary | ICD-10-CM

## 2019-04-05 RX ORDER — AMOXICILLIN 875 MG
875 TABLET ORAL 2 TIMES DAILY
Qty: 14 TABLET | Refills: 0 | Status: SHIPPED | OUTPATIENT
Start: 2019-04-05 | End: 2019-05-07

## 2019-04-05 NOTE — RESULT ENCOUNTER NOTE
Sinus culture positive. I did sent in Augmentin for her to complete if she remains symptomatic and has concerns regarding sinus infection.

## 2019-04-12 DIAGNOSIS — E78.2 MIXED HYPERLIPIDEMIA: ICD-10-CM

## 2019-04-12 RX ORDER — SIMVASTATIN 40 MG
TABLET ORAL
Qty: 90 TABLET | Refills: 1 | Status: SHIPPED | OUTPATIENT
Start: 2019-04-12 | End: 2019-10-06

## 2019-04-12 NOTE — TELEPHONE ENCOUNTER
Simvastatin 40 mg   Last Written Prescription Date:  1/6/19  Last Fill Quantity: 90,   # refills: 0  Last Office Visit: 4/2/19  Future Office visit:    Next 5 appointments (look out 90 days)    May 07, 2019  8:30 AM CDT  (Arrive by 8:15 AM)  PHYSICAL with Maryjane Lyon MD  [facility] ([facility] ) [address]  [place] [address]  [phone]           Routing refill request to provider for review/approval because:

## 2019-05-02 LAB
FUNGUS SPEC CULT: NORMAL
SPECIMEN SOURCE: NORMAL

## 2019-05-07 ENCOUNTER — OFFICE VISIT (OUTPATIENT)
Dept: FAMILY MEDICINE | Facility: OTHER | Age: 59
End: 2019-05-07
Attending: FAMILY MEDICINE
Payer: COMMERCIAL

## 2019-05-07 VITALS
DIASTOLIC BLOOD PRESSURE: 80 MMHG | OXYGEN SATURATION: 96 % | BODY MASS INDEX: 34.23 KG/M2 | SYSTOLIC BLOOD PRESSURE: 132 MMHG | HEART RATE: 72 BPM | TEMPERATURE: 98.3 F | HEIGHT: 62 IN | WEIGHT: 186 LBS

## 2019-05-07 DIAGNOSIS — Z90.710 STATUS POST HYSTERECTOMY: ICD-10-CM

## 2019-05-07 DIAGNOSIS — K76.0 HEPATIC STEATOSIS: ICD-10-CM

## 2019-05-07 DIAGNOSIS — E55.9 VITAMIN D DEFICIENCY: ICD-10-CM

## 2019-05-07 DIAGNOSIS — Z00.00 ROUTINE GENERAL MEDICAL EXAMINATION AT A HEALTH CARE FACILITY: Primary | ICD-10-CM

## 2019-05-07 DIAGNOSIS — E78.2 MIXED HYPERLIPIDEMIA: ICD-10-CM

## 2019-05-07 DIAGNOSIS — Z78.0 POST-MENOPAUSAL: ICD-10-CM

## 2019-05-07 DIAGNOSIS — N89.8 VAGINAL ITCHING: ICD-10-CM

## 2019-05-07 DIAGNOSIS — F41.9 ANXIETY: ICD-10-CM

## 2019-05-07 DIAGNOSIS — M62.838 MUSCLE SPASM: ICD-10-CM

## 2019-05-07 DIAGNOSIS — Z79.4 TYPE 2 DIABETES MELLITUS WITHOUT COMPLICATION, WITH LONG-TERM CURRENT USE OF INSULIN (H): ICD-10-CM

## 2019-05-07 DIAGNOSIS — I10 BENIGN ESSENTIAL HYPERTENSION: ICD-10-CM

## 2019-05-07 DIAGNOSIS — E66.01 MORBID OBESITY (H): ICD-10-CM

## 2019-05-07 DIAGNOSIS — E11.9 TYPE 2 DIABETES MELLITUS WITHOUT COMPLICATION, WITH LONG-TERM CURRENT USE OF INSULIN (H): ICD-10-CM

## 2019-05-07 LAB
ALBUMIN SERPL-MCNC: 4.1 G/DL (ref 3.4–5)
ALP SERPL-CCNC: 132 U/L (ref 40–150)
ALT SERPL W P-5'-P-CCNC: 52 U/L (ref 0–50)
ANION GAP SERPL CALCULATED.3IONS-SCNC: 6 MMOL/L (ref 3–14)
AST SERPL W P-5'-P-CCNC: 43 U/L (ref 0–45)
BASOPHILS # BLD AUTO: 0.1 10E9/L (ref 0–0.2)
BASOPHILS NFR BLD AUTO: 1 %
BILIRUB SERPL-MCNC: 0.6 MG/DL (ref 0.2–1.3)
BUN SERPL-MCNC: 9 MG/DL (ref 7–30)
CALCIUM SERPL-MCNC: 8.9 MG/DL (ref 8.5–10.1)
CHLORIDE SERPL-SCNC: 103 MMOL/L (ref 94–109)
CHOLEST SERPL-MCNC: 164 MG/DL
CO2 SERPL-SCNC: 29 MMOL/L (ref 20–32)
CREAT SERPL-MCNC: 0.57 MG/DL (ref 0.52–1.04)
DIFFERENTIAL METHOD BLD: NORMAL
EOSINOPHIL # BLD AUTO: 0.3 10E9/L (ref 0–0.7)
EOSINOPHIL NFR BLD AUTO: 3.5 %
ERYTHROCYTE [DISTWIDTH] IN BLOOD BY AUTOMATED COUNT: 13 % (ref 10–15)
GFR SERPL CREATININE-BSD FRML MDRD: >90 ML/MIN/{1.73_M2}
GLUCOSE SERPL-MCNC: 234 MG/DL (ref 70–99)
HBA1C MFR BLD: 9.5 % (ref 0–5.6)
HCT VFR BLD AUTO: 43.5 % (ref 35–47)
HDLC SERPL-MCNC: 31 MG/DL
HGB BLD-MCNC: 14.6 G/DL (ref 11.7–15.7)
IMM GRANULOCYTES # BLD: 0 10E9/L (ref 0–0.4)
IMM GRANULOCYTES NFR BLD: 0.2 %
LDLC SERPL CALC-MCNC: 76 MG/DL
LYMPHOCYTES # BLD AUTO: 3.6 10E9/L (ref 0.8–5.3)
LYMPHOCYTES NFR BLD AUTO: 44.6 %
MCH RBC QN AUTO: 28.5 PG (ref 26.5–33)
MCHC RBC AUTO-ENTMCNC: 33.6 G/DL (ref 31.5–36.5)
MCV RBC AUTO: 85 FL (ref 78–100)
MONOCYTES # BLD AUTO: 0.5 10E9/L (ref 0–1.3)
MONOCYTES NFR BLD AUTO: 5.7 %
NEUTROPHILS # BLD AUTO: 3.7 10E9/L (ref 1.6–8.3)
NEUTROPHILS NFR BLD AUTO: 45 %
NONHDLC SERPL-MCNC: 133 MG/DL
NRBC # BLD AUTO: 0 10*3/UL
NRBC BLD AUTO-RTO: 0 /100
PLATELET # BLD AUTO: 320 10E9/L (ref 150–450)
POTASSIUM SERPL-SCNC: 3.8 MMOL/L (ref 3.4–5.3)
PROT SERPL-MCNC: 8.1 G/DL (ref 6.8–8.8)
RBC # BLD AUTO: 5.13 10E12/L (ref 3.8–5.2)
SODIUM SERPL-SCNC: 138 MMOL/L (ref 133–144)
SPECIMEN SOURCE: NORMAL
TRIGL SERPL-MCNC: 284 MG/DL
TSH SERPL DL<=0.005 MIU/L-ACNC: 3.96 MU/L (ref 0.4–4)
WBC # BLD AUTO: 8.1 10E9/L (ref 4–11)
WET PREP SPEC: NORMAL

## 2019-05-07 PROCEDURE — 36415 COLL VENOUS BLD VENIPUNCTURE: CPT | Performed by: FAMILY MEDICINE

## 2019-05-07 PROCEDURE — 99396 PREV VISIT EST AGE 40-64: CPT | Performed by: FAMILY MEDICINE

## 2019-05-07 PROCEDURE — 80050 GENERAL HEALTH PANEL: CPT | Performed by: FAMILY MEDICINE

## 2019-05-07 PROCEDURE — 80061 LIPID PANEL: CPT | Performed by: FAMILY MEDICINE

## 2019-05-07 PROCEDURE — 87210 SMEAR WET MOUNT SALINE/INK: CPT | Performed by: FAMILY MEDICINE

## 2019-05-07 PROCEDURE — 82306 VITAMIN D 25 HYDROXY: CPT | Mod: 90 | Performed by: FAMILY MEDICINE

## 2019-05-07 PROCEDURE — 83036 HEMOGLOBIN GLYCOSYLATED A1C: CPT | Performed by: FAMILY MEDICINE

## 2019-05-07 PROCEDURE — 99000 SPECIMEN HANDLING OFFICE-LAB: CPT | Performed by: FAMILY MEDICINE

## 2019-05-07 RX ORDER — LIRAGLUTIDE 6 MG/ML
INJECTION SUBCUTANEOUS
Refills: 1 | COMMUNITY
Start: 2019-04-17 | End: 2019-10-01

## 2019-05-07 RX ORDER — CYCLOBENZAPRINE HCL 5 MG
5 TABLET ORAL 3 TIMES DAILY PRN
Qty: 30 TABLET | Refills: 0 | Status: SHIPPED | OUTPATIENT
Start: 2019-05-07 | End: 2019-12-21

## 2019-05-07 ASSESSMENT — PAIN SCALES - GENERAL: PAINLEVEL: MILD PAIN (3)

## 2019-05-07 ASSESSMENT — MIFFLIN-ST. JEOR: SCORE: 1386.94

## 2019-05-07 NOTE — NURSING NOTE
"Chief Complaint   Patient presents with     Physical       Initial /80   Pulse 72   Temp 98.3  F (36.8  C) (Tympanic)   Ht 1.575 m (5' 2\")   Wt 84.4 kg (186 lb)   SpO2 96%   BMI 34.02 kg/m   Estimated body mass index is 34.02 kg/m  as calculated from the following:    Height as of this encounter: 1.575 m (5' 2\").    Weight as of this encounter: 84.4 kg (186 lb).  Medication Reconciliation: complete    Lashell Lopez LPN  "

## 2019-05-07 NOTE — PROGRESS NOTES
t re SUBJECTIVE:   CC: Suzy Gallardo is an 58 year old woman who presents for preventive health visit.     Healthy Habits:    Do you get at least three servings of calcium containing foods daily (dairy, green leafy vegetables, etc.)? yes    Amount of exercise or daily activities, outside of work: minimal until recently     Problems taking medications regularly No    Medication side effects: No    Have you had an eye exam in the past two years? yes    Do you see a dentist twice per year? yes    Do you have sleep apnea, excessive snoring or daytime drowsiness?no      Musculoskeletal problem/pain      Duration: Last week    Description  Location: low back pain - threw back out    Intensity:  mild    Accompanying signs and symptoms: radiation of pain to right side (spasms)    History  Previous similar problem: YES - hx of bulging disks  Previous evaluation:  Yes - imaging    Precipitating or alleviating factors:  Trauma or overuse: no   Aggravating factors include: overuse    Therapies tried and outcome: rest/inactivity, Ibuprofen and tens unit - contacted chiropractor and he is out until the 13th of May      Today's PHQ-2 Score:   PHQ-2 ( 1999 Pfizer) 5/7/2019 1/9/2014   Q1: Little interest or pleasure in doing things 0 0   Q2: Feeling down, depressed or hopeless 0 0   PHQ-2 Score 0 0       Abuse: Current or Past(Physical, Sexual or Emotional)- No  Do you feel safe in your environment? Yes    Social History     Tobacco Use     Smoking status: Never Smoker     Smokeless tobacco: Never Used   Substance Use Topics     Alcohol use: No     If you drink alcohol do you typically have >3 drinks per day or >7 drinks per week? No                     Reviewed orders with patient.  Reviewed health maintenance and updated orders accordingly - Yes    Cancer Screenings:  Colon - Due at age 62  Cervical - S/p margoth  Breast - mammograms up to date  Lung - NA  Skin - No FH of melanoma no concerning lumps or bumps.  "    Immunizations:  Tdap - 2009  Zoster - Discussed, will check with insurance.   Influenza - Out of season  Prevnar - NA  Pneumovax - 2010    Cardiovascular:  Fasting glucose/Hgb A1C: pending  Cholesterol: pending  ASCVD score: NA    Other:  Osteoporosis -   --Vitamin D - Due    Reviewed and updated as needed this visit by clinical staff  Tobacco  Allergies  Meds  Problems  Med Hx  Surg Hx  Fam Hx  Soc Hx          Reviewed and updated as needed this visit by Provider  Tobacco  Allergies  Meds  Problems  Med Hx  Surg Hx  Fam Hx  Soc Hx             ROS:  CONSTITUTIONAL: NEGATIVE for fever, chills, change in weight  INTEGUMENTARY/SKIN: NEGATIVE for worrisome rashes, moles or lesions  EYES: NEGATIVE for vision changes or irritation  ENT: NEGATIVE for ear, mouth and throat problems  RESP: NEGATIVE for significant cough or SOB  BREAST: NEGATIVE for masses, tenderness or discharge  CV: NEGATIVE for chest pain, palpitations or peripheral edema  GI: NEGATIVE for nausea, abdominal pain, heartburn, or change in bowel habits  : NEGATIVE for unusual urinary or vaginal symptoms. No vaginal bleeding.  MUSCULOSKELETAL: NEGATIVE for significant arthralgias or myalgia  NEURO: NEGATIVE for weakness, dizziness or paresthesias  PSYCHIATRIC: NEGATIVE for changes in mood or affect     OBJECTIVE:   /80   Pulse 72   Temp 98.3  F (36.8  C) (Tympanic)   Ht 1.575 m (5' 2\")   Wt 84.4 kg (186 lb)   SpO2 96%   BMI 34.02 kg/m    EXAM:  GENERAL: healthy, alert and no distress  EYES: Eyes grossly normal to inspection, PERRL and conjunctivae and sclerae normal  HENT: ear canals and TM's normal, nose and mouth without ulcers or lesions  NECK: no adenopathy, no asymmetry, masses, or scars and thyroid normal to palpation  RESP: lungs clear to auscultation - no rales, rhonchi or wheezes  BREAST: normal without masses, tenderness or nipple discharge and no palpable axillary masses or adenopathy  CV: regular rate and rhythm, " normal S1 S2, no S3 or S4, no murmur, click or rub, no peripheral edema  ABDOMEN: soft, nontender, no hepatosplenomegaly, no masses and bowel sounds normal   (female): normal female external genitalia, normal urethral meatus  and vaginal mucosa pink, moist, well rugated  MS: no gross musculoskeletal defects noted, no edema  SKIN: no suspicious lesions or rashes  NEURO: Normal strength and tone, mentation intact and speech normal  PSYCH: mentation appears normal, affect normal/bright    Diagnostic Test Results:  Results for orders placed or performed in visit on 05/07/19   Vitamin D Deficiency   Result Value Ref Range    Vitamin D Deficiency screening 19 (L) 20 - 75 ug/L   Hemoglobin A1c   Result Value Ref Range    Hemoglobin A1C 9.5 (H) 0 - 5.6 %   Lipid Profile (Chol, Trig, HDL, LDL calc)   Result Value Ref Range    Cholesterol 164 <200 mg/dL    Triglycerides 284 (H) <150 mg/dL    HDL Cholesterol 31 (L) >49 mg/dL    LDL Cholesterol Calculated 76 <100 mg/dL    Non HDL Cholesterol 133 (H) <130 mg/dL   TSH with free T4 reflex   Result Value Ref Range    TSH 3.96 0.40 - 4.00 mU/L   CBC with platelets and differential   Result Value Ref Range    WBC 8.1 4.0 - 11.0 10e9/L    RBC Count 5.13 3.8 - 5.2 10e12/L    Hemoglobin 14.6 11.7 - 15.7 g/dL    Hematocrit 43.5 35.0 - 47.0 %    MCV 85 78 - 100 fl    MCH 28.5 26.5 - 33.0 pg    MCHC 33.6 31.5 - 36.5 g/dL    RDW 13.0 10.0 - 15.0 %    Platelet Count 320 150 - 450 10e9/L    Diff Method Automated Method     % Neutrophils 45.0 %    % Lymphocytes 44.6 %    % Monocytes 5.7 %    % Eosinophils 3.5 %    % Basophils 1.0 %    % Immature Granulocytes 0.2 %    Nucleated RBCs 0 0 /100    Absolute Neutrophil 3.7 1.6 - 8.3 10e9/L    Absolute Lymphocytes 3.6 0.8 - 5.3 10e9/L    Absolute Monocytes 0.5 0.0 - 1.3 10e9/L    Absolute Eosinophils 0.3 0.0 - 0.7 10e9/L    Absolute Basophils 0.1 0.0 - 0.2 10e9/L    Abs Immature Granulocytes 0.0 0 - 0.4 10e9/L    Absolute Nucleated RBC 0.0     Comprehensive metabolic panel (BMP + Alb, Alk Phos, ALT, AST, Total. Bili, TP)   Result Value Ref Range    Sodium 138 133 - 144 mmol/L    Potassium 3.8 3.4 - 5.3 mmol/L    Chloride 103 94 - 109 mmol/L    Carbon Dioxide 29 20 - 32 mmol/L    Anion Gap 6 3 - 14 mmol/L    Glucose 234 (H) 70 - 99 mg/dL    Urea Nitrogen 9 7 - 30 mg/dL    Creatinine 0.57 0.52 - 1.04 mg/dL    GFR Estimate >90 >60 mL/min/[1.73_m2]    GFR Estimate If Black >90 >60 mL/min/[1.73_m2]    Calcium 8.9 8.5 - 10.1 mg/dL    Bilirubin Total 0.6 0.2 - 1.3 mg/dL    Albumin 4.1 3.4 - 5.0 g/dL    Protein Total 8.1 6.8 - 8.8 g/dL    Alkaline Phosphatase 132 40 - 150 U/L    ALT 52 (H) 0 - 50 U/L    AST 43 0 - 45 U/L   Wet prep   Result Value Ref Range    Specimen Description Vagina     Wet Prep Moderate  WBC'S seen       Wet Prep No Trichomonas seen     Wet Prep No clue cells seen     Wet Prep No yeast seen        ASSESSMENT/PLAN:     Routine general medical examination at a health care facility  Labs, dexa ordered today.   - TSH with free T4 reflex  - DX Hip/Pelvis/Spine; Future    Morbid obesity (H) / Hepatic steatosis  History of elevated transaminases. DM2. Weight stable. Hep B vaccinations up to date.     Type 2 diabetes mellitus without complication, with long-term current use of insulin (H)  Due for recheck.   - Hemoglobin A1c    Mixed hyperlipidemia  On statin, tolerating. LDL < 100.   - Lipid Profile (Chol, Trig, HDL, LDL calc)    Benign essential hypertension  Controlled.   - CBC with platelets and differential  - Comprehensive metabolic panel (BMP + Alb, Alk Phos, ALT, AST, Total. Bili, TP)    Anxiety  Stable.     Muscle spasm  Occasional. Has used flexeril in the past with some relief.   - cyclobenzaprine (FLEXERIL) 5 MG tablet; Take 1 tablet (5 mg) by mouth 3 times daily as needed for muscle spasms  Dispense: 30 tablet; Refill: 0    Vaginal itching  No concerning findings on wet prep. Mild erythema, possibly related to atrophy, consider  "vaginal estrogen.   - Wet prep    COUNSELING:   Reviewed preventive health counseling, as reflected in patient instructions       Regular exercise       Healthy diet/nutrition       Osteoporosis Prevention/Bone Health    BP Readings from Last 1 Encounters:   05/07/19 132/80     Estimated body mass index is 34.02 kg/m  as calculated from the following:    Height as of this encounter: 1.575 m (5' 2\").    Weight as of this encounter: 84.4 kg (186 lb).    BP Screening:   Last 3 BP Readings:    BP Readings from Last 3 Encounters:   05/07/19 132/80   04/03/19 142/78   04/02/19 138/80       The following was recommended to the patient:  Re-screen BP within a year and recommended lifestyle modifications  Weight management plan: Discussed healthy diet and exercise guidelines     reports that she has never smoked. She has never used smokeless tobacco.      Counseling Resources:  ATP IV Guidelines  Pooled Cohorts Equation Calculator  Breast Cancer Risk Calculator  FRAX Risk Assessment  ICSI Preventive Guidelines  Dietary Guidelines for Americans, 2010  USDA's MyPlate  ASA Prophylaxis  Lung CA Screening    Maryjane Lyon MD  [facility] - [place]  "

## 2019-05-08 LAB — DEPRECATED CALCIDIOL+CALCIFEROL SERPL-MC: 19 UG/L (ref 20–75)

## 2019-05-09 DIAGNOSIS — E55.9 VITAMIN D DEFICIENCY: Primary | ICD-10-CM

## 2019-05-09 RX ORDER — ERGOCALCIFEROL 1.25 MG/1
50000 CAPSULE, LIQUID FILLED ORAL WEEKLY
Qty: 8 CAPSULE | Refills: 0 | Status: SHIPPED | OUTPATIENT
Start: 2019-05-09 | End: 2019-06-24

## 2019-05-13 ENCOUNTER — ANCILLARY PROCEDURE (OUTPATIENT)
Dept: MAMMOGRAPHY | Facility: OTHER | Age: 59
End: 2019-05-13
Attending: FAMILY MEDICINE
Payer: COMMERCIAL

## 2019-05-13 DIAGNOSIS — Z12.39 SCREENING BREAST EXAMINATION: ICD-10-CM

## 2019-05-13 PROCEDURE — 77063 BREAST TOMOSYNTHESIS BI: CPT | Mod: TC

## 2019-05-13 PROCEDURE — 77067 SCR MAMMO BI INCL CAD: CPT | Mod: TC

## 2019-05-21 ENCOUNTER — HOSPITAL ENCOUNTER (OUTPATIENT)
Dept: BONE DENSITY | Facility: HOSPITAL | Age: 59
Discharge: HOME | End: 2019-05-21
Attending: FAMILY MEDICINE | Admitting: FAMILY MEDICINE
Payer: COMMERCIAL

## 2019-05-21 DIAGNOSIS — Z78.0 POST-MENOPAUSAL: ICD-10-CM

## 2019-05-21 PROCEDURE — 77080 DXA BONE DENSITY AXIAL: CPT | Mod: TC

## 2019-05-28 NOTE — TELEPHONE ENCOUNTER
lantus solostar  Last Written Prescription Date:   Last Fill Quantity:  # of Refills:   Last Office Visit: 5/7/19    Patient reported medication

## 2019-05-30 RX ORDER — INSULIN GLARGINE 100 [IU]/ML
INJECTION, SOLUTION SUBCUTANEOUS
Qty: 45 ML | Refills: 3 | Status: SHIPPED | OUTPATIENT
Start: 2019-05-30 | End: 2019-10-01

## 2019-06-11 DIAGNOSIS — F34.1 DYSTHYMIA: ICD-10-CM

## 2019-06-12 RX ORDER — VENLAFAXINE HYDROCHLORIDE 75 MG/1
CAPSULE, EXTENDED RELEASE ORAL
Qty: 180 CAPSULE | Refills: 0 | Status: SHIPPED | OUTPATIENT
Start: 2019-06-12 | End: 2019-09-07

## 2019-06-24 DIAGNOSIS — E55.9 VITAMIN D DEFICIENCY: ICD-10-CM

## 2019-06-25 RX ORDER — ERGOCALCIFEROL 1.25 MG/1
50000 CAPSULE, LIQUID FILLED ORAL WEEKLY
Qty: 8 CAPSULE | Refills: 0 | Status: SHIPPED | OUTPATIENT
Start: 2019-06-25 | End: 2019-08-17

## 2019-06-25 NOTE — TELEPHONE ENCOUNTER
Vitamin D2  Last Written Prescription Date: 5/9/19  Last Fill Quantity: 8 # of Refills: 0  Last Office Visit: 5/7/19

## 2019-06-28 DIAGNOSIS — E55.9 VITAMIN D DEFICIENCY: ICD-10-CM

## 2019-06-28 RX ORDER — ERGOCALCIFEROL 1.25 MG/1
50000 CAPSULE, LIQUID FILLED ORAL WEEKLY
Qty: 4 CAPSULE | Refills: 1 | OUTPATIENT
Start: 2019-06-28

## 2019-06-28 NOTE — TELEPHONE ENCOUNTER
Refill request  Last office visit 5/7/19  Vitamin D 50,000 unit is not covered by protocol.   :   VITAMIN D2 1.25MG(50,000 UNIT)          Will file in chart as: vitamin D2 (ERGOCALCIFEROL) 79436 units (1250 mcg) capsule        Sig: Take 1 capsule (50,000 Units) by mouth once a week    Disp:  4 capsule    Refills:  1    Start: 6/28/2019    Class: E-Prescribe    For: Vitamin D deficiency    Requested on: 5/9/2019    Last ordered: 1 month ago by Maryjane Lyon MD Last refill: 6/4/2019    Rx #: [id number]     Please advise refill

## 2019-07-31 ENCOUNTER — TRANSFERRED RECORDS (OUTPATIENT)
Dept: HEALTH INFORMATION MANAGEMENT | Facility: CLINIC | Age: 59
End: 2019-07-31

## 2019-08-17 DIAGNOSIS — E55.9 VITAMIN D DEFICIENCY: ICD-10-CM

## 2019-08-20 RX ORDER — ERGOCALCIFEROL 1.25 MG/1
50000 CAPSULE, LIQUID FILLED ORAL WEEKLY
Qty: 8 CAPSULE | Refills: 0 | Status: SHIPPED | OUTPATIENT
Start: 2019-08-20 | End: 2020-01-30

## 2019-08-20 NOTE — TELEPHONE ENCOUNTER
vitamin D2 (ERGOCALCIFEROL) 36800 units (1250 mcg) capsule      Last Written Prescription Date:  6-25-19  Last Fill Quantity: 8,   # refills: 0  Last Office Visit: 4-2-19  Future Office visit:    Next 5 appointments (look out 90 days)    Oct 01, 2019  1:15 PM CDT  (Arrive by 1:00 PM)  SHORT with Nathanael Mena MD  [facility] ([facility] ) [address] [address]  [phone]           Routing refill request to provider for review/approval because:  Drug not on the [facility], [facility] or  Health refill protocol or controlled substance

## 2019-09-07 DIAGNOSIS — F34.1 DYSTHYMIA: ICD-10-CM

## 2019-09-11 RX ORDER — VENLAFAXINE HYDROCHLORIDE 75 MG/1
CAPSULE, EXTENDED RELEASE ORAL
Qty: 180 CAPSULE | Refills: 0 | Status: SHIPPED | OUTPATIENT
Start: 2019-09-11 | End: 2019-10-01

## 2019-09-11 NOTE — TELEPHONE ENCOUNTER
Effexor  Last Written Prescription Date: 6/12/19  Last Fill Quantity: 180 # of Refills: 0  Last Office Visit: 5/7/19

## 2019-09-12 ENCOUNTER — MYC MEDICAL ADVICE (OUTPATIENT)
Dept: FAMILY MEDICINE | Facility: OTHER | Age: 59
End: 2019-09-12

## 2019-09-12 DIAGNOSIS — L03.90 CELLULITIS, UNSPECIFIED CELLULITIS SITE: Primary | ICD-10-CM

## 2019-09-12 RX ORDER — VENLAFAXINE HYDROCHLORIDE 150 MG/1
150 CAPSULE, EXTENDED RELEASE ORAL DAILY
Qty: 90 CAPSULE | Refills: 0 | Status: SHIPPED | OUTPATIENT
Start: 2019-09-12 | End: 2019-10-01

## 2019-09-12 RX ORDER — SULFAMETHOXAZOLE AND TRIMETHOPRIM 400; 80 MG/1; MG/1
1 TABLET ORAL 2 TIMES DAILY
COMMUNITY
End: 2019-09-12

## 2019-09-12 RX ORDER — SULFAMETHOXAZOLE AND TRIMETHOPRIM 400; 80 MG/1; MG/1
1 TABLET ORAL 2 TIMES DAILY
Qty: 10 TABLET | Refills: 0 | Status: SHIPPED | OUTPATIENT
Start: 2019-09-12 | End: 2019-10-01

## 2019-09-12 NOTE — TELEPHONE ENCOUNTER
"Insurance does not cover \"2QD\"   venlafaxine (EFFEXOR-XR) 75 MG 24 hr capsule. Covers \"150mg every day\"  Need new RX.   New RX pended. Please advise. Thank you.   "

## 2019-09-12 NOTE — TELEPHONE ENCOUNTER
My Chart Message:  Hi.  I had the start of cellulitis over Labor Day weekend and started on my two antibiotics right away.   Can I get them refilled again to have on hand even though I have an upcoming appt. on Oct. 1?  (Amoxicillin/Augmentin  and Bactrim/Sulfamethoxazole)  The ones I had supposedly  in May, but I took them anyway......hope that was ok.       Thanks,   Suzy Gallardo

## 2019-09-24 NOTE — PROGRESS NOTES
Subjective     Suzy Gallardo is a 59 year old female who presents to clinic today for the following health issues:    HPI   Diabetes Follow-up      How often are you checking your blood sugar? A few times a week    What time of day are you checking your blood sugars (select all that apply)?  Before meals    Have you had any blood sugars above 200?  Yes highest 242    Have you had any blood sugars below 70?  No    What symptoms do you notice when your blood sugar is low?  Confusion    What concerns do you have today about your diabetes? None     Do you have any of these symptoms? (Select all that apply)  Redness, sores, or blisters on feet     Have you had a diabetic eye exam in the last 12 months? Yes- Date of last eye exam: july 2019    BP Readings from Last 2 Encounters:   05/07/19 132/80   04/03/19 142/78     Hemoglobin A1C (%)   Date Value   05/07/2019 9.5 (H)   10/24/2018 9.5 (H)     LDL Cholesterol Calculated (mg/dL)   Date Value   05/07/2019 76   10/24/2018 31       Diabetes Management Resources  Hypertension Follow-up      Do you check your blood pressure regularly outside of the clinic? No     Are you following a low salt diet? Yes    Are your blood pressures ever more than 140 on the top number (systolic) OR more   than 90 on the bottom number (diastolic), for example 140/90? No    Diabetic foot exam   1. foot deformity   No  2. Current or previous foot ulceration     No  3. Current or previous pre-ulcerative calluses     No  4. previous partial amputation of one or both feet or complete amputation of one foot     No  5. peripheral neuropathy with evidence of callus formation     Yes  6. poor circulation     No      PROBLEMS TO ADD ON...    Patient Active Problem List   Diagnosis     [place] disease      Cellulitis     Annual physical exam     Morbid obesity (H)     Disorder of lipoid metabolism     Status post hysterectomy     ACP (advance care planning)     Type 2 diabetes mellitus without complication,  "with long-term current use of insulin (H)     Depression     Mixed hyperlipidemia     Elevated liver function tests     Abnormal stress test     Chest discomfort     Benign essential hypertension     Hepatic steatosis     Anxiety     Past Surgical History:   Procedure Laterality Date     BREAST SURGERY      breast biopsy     COLONOSCOPY  5/3/2013    Procedure: COLONOSCOPY;  COLONOSCOPY;  Surgeon: Pablito Urbina MD;  Location: [facility] OR     ENDOMETRIAL SAMPLING (BIOPSY)       HYSTERECTOMY, CERVIX STATUS UNKNOWN       LAPAROSCOPY       NOSE SURGERY      birth edilberto removed     wisdom teeth removed         Social History     Tobacco Use     Smoking status: Never Smoker     Smokeless tobacco: Never Used   Substance Use Topics     Alcohol use: No     Family History   Problem Relation Age of Onset     Stomach Cancer Brother      C.A.D. Father 40        s/p quad bypass     Diabetes Type 2  Brother      Hypertension Brother      Hypertension Other      Chronic Obstructive Pulmonary Disease Mother      Lung Cancer Mother      Bladder Cancer Mother          Current Outpatient Medications   Medication Sig Dispense Refill     ASPIRIN PO Take 81 mg by mouth daily        Blood Glucose Monitoring Suppl (ONE TOUCH ULTRA) 1 each strip TID AC       calcium-vitamin D (CALTRATE) 600-400 MG-UNIT per tablet Take 1 tablet by mouth daily.       cyclobenzaprine (FLEXERIL) 5 MG tablet Take 1 tablet (5 mg) by mouth 3 times daily as needed for muscle spasms 30 tablet 0     furosemide (LASIX) 40 MG tablet TAKE 1 TABLET (40 MG) BY MOUTH 2 TIMES DAILY 180 tablet 1     IBUPROFEN PO        insulin glargine (LANTUS SOLOSTAR) 100 UNIT/ML pen INJECT 53 UNITS INTO THE SKIN EVERY NIGHT AT BEDTIME. 45 mL 3     Insulin Pen Needle (BD U/F III SHORT PEN NEEDLE) See instructions.       Insulin Pen Needle (PEN NEEDLES 5/16\") 31G X 8 MM MISC        liraglutide (VICTOZA) 18 MG/3ML SOLN Inject 1.8 mg Subcutaneous daily        metFORMIN (GLUCOPHAGE) 1000 MG tablet " TAKE 1 TABLET BY MOUTH TWICE DAILY WITH MEALS. 180 tablet 3     simvastatin (ZOCOR) 40 MG tablet TAKE 1 TABLET BY MOUTH ONCE DAILY 90 tablet 1     venlafaxine (EFFEXOR-XR) 75 MG 24 hr capsule Take 2 pills daily 180 capsule 3     vitamin D2 (ERGOCALCIFEROL) 95781 units (1250 mcg) capsule TAKE 1 CAPSULE (50,000 UNITS) BY MOUTH ONCE A WEEK 8 capsule 0     No Known Allergies    PROBLEMS TO ADD ON...  Reviewed and updated as needed this visit by Provider         Review of Systems   ROS COMP: Constitutional, HEENT, cardiovascular, pulmonary, gi and gu systems are negative, except as otherwise noted.      Objective    There were no vitals taken for this visit.  There is no height or weight on file to calculate BMI.  Physical Exam   GENERAL: healthy, alert and no distress  NECK: no adenopathy, no asymmetry, masses, or scars and thyroid normal to palpation  RESP: lungs clear to auscultation - no rales, rhonchi or wheezes  CV: regular rate and rhythm, normal S1 S2, no S3 or S4, no murmur, click or rub, no peripheral edema and peripheral pulses strong  ABDOMEN: soft, nontender, no hepatosplenomegaly, no masses and bowel sounds normal  MS: no gross musculoskeletal defects noted, no edema    Diagnostic Test Results:  Labs reviewed in Epic        Assessment & Plan       ICD-10-CM    1. Type 2 diabetes mellitus without complication, with long-term current use of insulin (H) E11.9 C FOOT EXAM  NO CHARGE    Z79.4 Basic metabolic panel     Hemoglobin A1c     Albumin Random Urine Quantitative with Creat Ratio     metFORMIN (GLUCOPHAGE) 1000 MG tablet   2. Mixed hyperlipidemia E78.2    3. Benign essential hypertension I10    4. Cellulitis of lower extremity, unspecified laterality L03.119    5. Vitamin D deficiency E55.9 Vitamin D Deficiency   6. IDDM (insulin dependent diabetes mellitus) (H) E11.9 insulin glargine (LANTUS SOLOSTAR) 100 UNIT/ML pen    Z79.4    7. Dysthymia F34.1 venlafaxine (EFFEXOR-XR) 75 MG 24 hr capsule       No  "change in cares.  Sugar has been up and she is very disappointed.  We have inched up on the insulin.  Await full labs and take it from there.    BMI:   Estimated body mass index is 33.65 kg/m  as calculated from the following:    Height as of 5/7/19: 1.575 m (5' 2\").    Weight as of this encounter: 83.5 kg (184 lb).               No follow-ups on file.    Nathanael Mena MD  [facility]      "

## 2019-10-01 ENCOUNTER — OFFICE VISIT (OUTPATIENT)
Dept: FAMILY MEDICINE | Facility: OTHER | Age: 59
End: 2019-10-01
Attending: FAMILY MEDICINE
Payer: COMMERCIAL

## 2019-10-01 VITALS
DIASTOLIC BLOOD PRESSURE: 74 MMHG | SYSTOLIC BLOOD PRESSURE: 128 MMHG | HEART RATE: 72 BPM | BODY MASS INDEX: 33.65 KG/M2 | OXYGEN SATURATION: 95 % | WEIGHT: 184 LBS

## 2019-10-01 DIAGNOSIS — Z79.4 TYPE 2 DIABETES MELLITUS WITHOUT COMPLICATION, WITH LONG-TERM CURRENT USE OF INSULIN (H): Primary | ICD-10-CM

## 2019-10-01 DIAGNOSIS — E55.9 VITAMIN D DEFICIENCY: ICD-10-CM

## 2019-10-01 DIAGNOSIS — E78.2 MIXED HYPERLIPIDEMIA: ICD-10-CM

## 2019-10-01 DIAGNOSIS — I10 BENIGN ESSENTIAL HYPERTENSION: ICD-10-CM

## 2019-10-01 DIAGNOSIS — L03.119 CELLULITIS OF LOWER EXTREMITY, UNSPECIFIED LATERALITY: ICD-10-CM

## 2019-10-01 DIAGNOSIS — E11.9 TYPE 2 DIABETES MELLITUS WITHOUT COMPLICATION, WITH LONG-TERM CURRENT USE OF INSULIN (H): Primary | ICD-10-CM

## 2019-10-01 DIAGNOSIS — F34.1 DYSTHYMIA: ICD-10-CM

## 2019-10-01 PROCEDURE — 99214 OFFICE O/P EST MOD 30 MIN: CPT | Performed by: FAMILY MEDICINE

## 2019-10-01 PROCEDURE — 82306 VITAMIN D 25 HYDROXY: CPT | Performed by: FAMILY MEDICINE

## 2019-10-01 PROCEDURE — 83036 HEMOGLOBIN GLYCOSYLATED A1C: CPT | Performed by: FAMILY MEDICINE

## 2019-10-01 PROCEDURE — 80048 BASIC METABOLIC PNL TOTAL CA: CPT | Performed by: FAMILY MEDICINE

## 2019-10-01 PROCEDURE — 36415 COLL VENOUS BLD VENIPUNCTURE: CPT | Performed by: FAMILY MEDICINE

## 2019-10-01 PROCEDURE — 82043 UR ALBUMIN QUANTITATIVE: CPT | Performed by: FAMILY MEDICINE

## 2019-10-01 RX ORDER — VENLAFAXINE HYDROCHLORIDE 75 MG/1
CAPSULE, EXTENDED RELEASE ORAL
Qty: 180 CAPSULE | Refills: 3 | Status: SHIPPED | OUTPATIENT
Start: 2019-10-01 | End: 2020-01-10

## 2019-10-01 ASSESSMENT — ANXIETY QUESTIONNAIRES
3. WORRYING TOO MUCH ABOUT DIFFERENT THINGS: NOT AT ALL
6. BECOMING EASILY ANNOYED OR IRRITABLE: SEVERAL DAYS
4. TROUBLE RELAXING: NOT AT ALL
5. BEING SO RESTLESS THAT IT IS HARD TO SIT STILL: NOT AT ALL
7. FEELING AFRAID AS IF SOMETHING AWFUL MIGHT HAPPEN: NOT AT ALL
1. FEELING NERVOUS, ANXIOUS, OR ON EDGE: NOT AT ALL
GAD7 TOTAL SCORE: 1
IF YOU CHECKED OFF ANY PROBLEMS ON THIS QUESTIONNAIRE, HOW DIFFICULT HAVE THESE PROBLEMS MADE IT FOR YOU TO DO YOUR WORK, TAKE CARE OF THINGS AT HOME, OR GET ALONG WITH OTHER PEOPLE: NOT DIFFICULT AT ALL
2. NOT BEING ABLE TO STOP OR CONTROL WORRYING: NOT AT ALL

## 2019-10-01 ASSESSMENT — PATIENT HEALTH QUESTIONNAIRE - PHQ9: SUM OF ALL RESPONSES TO PHQ QUESTIONS 1-9: 4

## 2019-10-01 ASSESSMENT — PAIN SCALES - GENERAL: PAINLEVEL: MILD PAIN (2)

## 2019-10-01 NOTE — NURSING NOTE
"Chief Complaint   Patient presents with     Diabetes       Initial /74   Pulse 72   Wt 83.5 kg (184 lb)   SpO2 95%   BMI 33.65 kg/m   Estimated body mass index is 33.65 kg/m  as calculated from the following:    Height as of 5/7/19: 1.575 m (5' 2\").    Weight as of this encounter: 83.5 kg (184 lb).  Medication Reconciliation: complete  Brenda Watt MA    "

## 2019-10-02 LAB
ANION GAP SERPL CALCULATED.3IONS-SCNC: 9 MMOL/L (ref 3–14)
BUN SERPL-MCNC: 10 MG/DL (ref 7–30)
CALCIUM SERPL-MCNC: 9.4 MG/DL (ref 8.5–10.1)
CHLORIDE SERPL-SCNC: 100 MMOL/L (ref 94–109)
CO2 SERPL-SCNC: 28 MMOL/L (ref 20–32)
CREAT SERPL-MCNC: 0.55 MG/DL (ref 0.52–1.04)
CREAT UR-MCNC: 210 MG/DL
EST. AVERAGE GLUCOSE BLD GHB EST-MCNC: 197 MG/DL
GFR SERPL CREATININE-BSD FRML MDRD: >90 ML/MIN/{1.73_M2}
GLUCOSE SERPL-MCNC: 203 MG/DL (ref 70–99)
HBA1C MFR BLD: 8.5 % (ref 0–5.6)
MICROALBUMIN UR-MCNC: 36 MG/L
MICROALBUMIN/CREAT UR: 16.95 MG/G CR (ref 0–25)
POTASSIUM SERPL-SCNC: 4 MMOL/L (ref 3.4–5.3)
SODIUM SERPL-SCNC: 137 MMOL/L (ref 133–144)

## 2019-10-02 ASSESSMENT — ANXIETY QUESTIONNAIRES: GAD7 TOTAL SCORE: 1

## 2019-10-03 LAB — DEPRECATED CALCIDIOL+CALCIFEROL SERPL-MC: 33 UG/L (ref 20–75)

## 2019-10-06 DIAGNOSIS — E78.2 MIXED HYPERLIPIDEMIA: ICD-10-CM

## 2019-10-08 RX ORDER — SIMVASTATIN 40 MG
TABLET ORAL
Qty: 90 TABLET | Refills: 1 | Status: SHIPPED | OUTPATIENT
Start: 2019-10-08 | End: 2020-04-06

## 2019-10-13 DIAGNOSIS — E55.9 VITAMIN D DEFICIENCY: ICD-10-CM

## 2019-10-15 RX ORDER — ERGOCALCIFEROL 1.25 MG/1
50000 CAPSULE, LIQUID FILLED ORAL WEEKLY
Qty: 8 CAPSULE | Refills: 0 | OUTPATIENT
Start: 2019-10-15

## 2019-10-15 NOTE — TELEPHONE ENCOUNTER
Her Vit D level is now in normal range so she does not need the high dose Vit D again. I advise Vit D3 1000 international unit(s) daily.

## 2019-10-15 NOTE — TELEPHONE ENCOUNTER
vitamin D2 (ERGOCALCIFEROL) 95749 units (1250 mcg) capsule      Last Written Prescription Date:  8-20-19  Last Fill Quantity: 8,   # refills: 0  Last Office Visit: 10-1-19  Future Office visit:       Routing refill request to provider for review/approval because:  Drug not on the [facility], P or [facility] refill protocol or controlled substance

## 2019-10-17 DIAGNOSIS — E55.9 VITAMIN D DEFICIENCY: ICD-10-CM

## 2019-10-18 RX ORDER — ERGOCALCIFEROL 1.25 MG/1
50000 CAPSULE, LIQUID FILLED ORAL WEEKLY
Qty: 8 CAPSULE | Refills: 0 | OUTPATIENT
Start: 2019-10-18

## 2019-10-18 NOTE — TELEPHONE ENCOUNTER
vitamin D2 (ERGOCALCIFEROL) 26984 units (1250 mcg) capsule      Last Written Prescription Date:  08/20/19  Last Fill Quantity: 8 cap,   # refills: 0  Last Office Visit: 10/01/2019  Future Office visit:       Routing refill request to provider for review/approval because:  Drug not on the [facility], P or [facility] refill protocol or controlled substance

## 2019-11-01 ENCOUNTER — HEALTH MAINTENANCE LETTER (OUTPATIENT)
Age: 59
End: 2019-11-01

## 2019-12-03 ENCOUNTER — TELEPHONE (OUTPATIENT)
Dept: FAMILY MEDICINE | Facility: OTHER | Age: 59
End: 2019-12-03

## 2019-12-03 DIAGNOSIS — E11.9 TYPE 2 DIABETES MELLITUS WITHOUT COMPLICATION, WITH LONG-TERM CURRENT USE OF INSULIN (H): Primary | ICD-10-CM

## 2019-12-03 DIAGNOSIS — Z79.4 TYPE 2 DIABETES MELLITUS WITHOUT COMPLICATION, WITH LONG-TERM CURRENT USE OF INSULIN (H): Primary | ICD-10-CM

## 2019-12-03 RX ORDER — INSULIN GLARGINE 100 [IU]/ML
53 INJECTION, SOLUTION SUBCUTANEOUS DAILY
Qty: 16 ML | Refills: 3 | Status: SHIPPED | OUTPATIENT
Start: 2019-12-03 | End: 2020-01-17

## 2019-12-03 NOTE — TELEPHONE ENCOUNTER
Fax received from [facility] pharmacy the pt's insurance will not cover Lantus, but will cover Basaglar.   LOV and labs 10/01/19

## 2019-12-04 DIAGNOSIS — F34.1 DYSTHYMIA: ICD-10-CM

## 2019-12-06 RX ORDER — VENLAFAXINE HYDROCHLORIDE 150 MG/1
CAPSULE, EXTENDED RELEASE ORAL
Qty: 90 CAPSULE | Refills: 0 | OUTPATIENT
Start: 2019-12-06

## 2019-12-21 ENCOUNTER — HOSPITAL ENCOUNTER (EMERGENCY)
Facility: HOSPITAL | Age: 59
Discharge: HOME | End: 2019-12-21
Attending: PHYSICIAN ASSISTANT | Admitting: PHYSICIAN ASSISTANT
Payer: COMMERCIAL

## 2019-12-21 VITALS
RESPIRATION RATE: 16 BRPM | OXYGEN SATURATION: 98 % | DIASTOLIC BLOOD PRESSURE: 73 MMHG | HEART RATE: 68 BPM | TEMPERATURE: 99 F | SYSTOLIC BLOOD PRESSURE: 154 MMHG

## 2019-12-21 DIAGNOSIS — K12.0 APHTHOUS ULCER OF MOUTH: ICD-10-CM

## 2019-12-21 DIAGNOSIS — L03.211 CELLULITIS OF FACE: ICD-10-CM

## 2019-12-21 DIAGNOSIS — K08.89 OTHER SPECIFIED DISORDERS OF TEETH AND SUPPORTING STRUCTURES: ICD-10-CM

## 2019-12-21 DIAGNOSIS — K08.89 TOOTH PAIN: ICD-10-CM

## 2019-12-21 LAB
DEPRECATED S PYO AG THROAT QL EIA: NORMAL
SPECIMEN SOURCE: NORMAL

## 2019-12-21 PROCEDURE — 87880 STREP A ASSAY W/OPTIC: CPT | Performed by: PHYSICIAN ASSISTANT

## 2019-12-21 PROCEDURE — 87081 CULTURE SCREEN ONLY: CPT | Performed by: PHYSICIAN ASSISTANT

## 2019-12-21 PROCEDURE — 99213 OFFICE O/P EST LOW 20 MIN: CPT | Mod: Z6 | Performed by: PHYSICIAN ASSISTANT

## 2019-12-21 PROCEDURE — G0463 HOSPITAL OUTPT CLINIC VISIT: HCPCS

## 2019-12-21 NOTE — ED PROVIDER NOTES
History     Chief Complaint   Patient presents with     Dental Pain     HPI  Suzy Gallardo is a 59 year old female who presents with complaints of mouth pain on the right side of the mouth with associated tooth abscess that was treated by a dentist with clindamycin.  1 day after beginning treatment with clindamycin the patient began to develop pain on the right side of the mouth.  She denies fever.  She reports the tooth pain is improving.    Allergies:  No Known Allergies    Problem List:    Patient Active Problem List    Diagnosis Date Noted     Abnormal stress test 12/17/2018     Priority: Medium     Chest discomfort 12/17/2018     Priority: Medium     Benign essential hypertension 12/17/2018     Priority: Medium     Hepatic steatosis 12/17/2018     Priority: Medium     Anxiety 12/17/2018     Priority: Medium     Elevated liver function tests 04/17/2018     Priority: Medium     ACP (advance care planning) 08/03/2016     Priority: Medium     Advance Care Planning 8/3/2016: ACP Review of Chart / Resources Provided:  Reviewed chart for advance care plan.  Suzy Gallardo has been provided information and resources to begin or update their advance care plan.  Added by Sisi Link               Type 2 diabetes mellitus without complication, with long-term current use of insulin (H) 08/03/2016     Priority: Medium     Depression 04/13/2016     Priority: Medium     Mixed hyperlipidemia 04/13/2016     Priority: Medium     Status post hysterectomy 01/13/2015     Priority: Medium     Annual physical exam 01/07/2014     Priority: Medium     Morbid obesity (H) 01/07/2014     Priority: Medium     Overview:   Initial visit 4/13/16 initial  weight  198  Goal weight 150 BMI of 25     BMR 1460  Low activity  1752 Light activity 2007 Moderate activity 2263  High activity 2518    Calorie goal: 1200 to 1400   Carb goal :50 gm net  Protein goal: 75       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 Provider to review       Cellulitis 10/31/2013     Priority: Medium     [place] disease  03/01/2012     Priority: Medium     Nathanael Mena MD           Past Medical History:    Past Medical History:   Diagnosis Date     Anemia, unspecified 06/23/2004     Depressive disorder, not elsewhere classified 01/19/2004     Diabetes mellitus without mention of complication, 11/30/2001     Endometriosis of uterus 12/19/2001     [place] disease 03/01/2012     Other (abnormal) findings on radiological examination of breast 08/15/2002     Pure hypercholesterolemia 11/26/1999     Sterilization 12/04/2001       Social History:  Marital Status:   [2]  Social History     Tobacco Use     Smoking status: Never Smoker     Smokeless tobacco: Never Used   Substance Use Topics     Alcohol use: No     Drug use: No        Medications:    amoxicillin-clavulanate (AUGMENTIN) 875-125 MG tablet  ASPIRIN PO  Blood Glucose Monitoring Suppl (ONE TOUCH ULTRA)  calcium-vitamin D (CALTRATE) 600-400 MG-UNIT per tablet  furosemide (LASIX) 40 MG tablet  insulin glargine (BASAGLAR KWIKPEN) 100 UNIT/ML pen  insulin glargine (LANTUS SOLOSTAR) 100 UNIT/ML pen  Insulin Pen Needle (BD U/F III SHORT PEN NEEDLE)  Insulin Pen Needle (PEN NEEDLES 5/16\") 31G X 8 MM MISC  liraglutide (VICTOZA) 18 MG/3ML solution  metFORMIN (GLUCOPHAGE) 1000 MG tablet  simvastatin (ZOCOR) 40 MG tablet  venlafaxine (EFFEXOR-XR) 75 MG 24 hr capsule  vitamin D2 (ERGOCALCIFEROL) 51397 units (1250 mcg) capsule  IBUPROFEN PO          Review of Systems :  Constitutional: Negative for fever.   HENT: Positive for tooth and mouth pain.    Physical Exam   BP: 154/73  Pulse: 68  Temp: 99  F (37.2  C)  Resp: 16  SpO2: 98 %    Physical Exam   Constitutional: Well-developed and well-nourished.   Head: Normocephalic and atraumatic.   Eyes: Conjunctivae and EOM are normal. Pupils are equal, round, and reactive to light.   Neck: Normal range of motion. "   Pulmonary/Chest: Effort normal  Skin: Skin is warm and dry. No rash noted.   Neuro: Gait normal.    Ears: TMs normal bilaterally  Mouth: No tenderness of right, upper posterior molar which was initially painful.  Aphthous ulcer on right side of hard palate.    ED Course               Results for orders placed or performed during the hospital encounter of 12/21/19 (from the past 24 hour(s))   Rapid strep screen   Result Value Ref Range    Specimen Description Throat     Rapid Strep A Screen       NEGATIVE: No Group A streptococcal antigen detected by immunoassay, await culture report.       Medications - No data to display    Assessments & Plan (with Medical Decision Making)     I have reviewed the nursing notes.    I have reviewed the findings, diagnosis, plan and need for follow up with the patient.  Will switch to Augmentin in the event ulcers are due to use of clindamycin.         Medication List      Started    amoxicillin-clavulanate 875-125 MG tablet  Commonly known as:  AUGMENTIN  1 tablet, Oral, 2 TIMES DAILY            Final diagnoses:   Tooth pain   Aphthous ulcer of mouth       PAULETTE Frances on 12/21/2019 at 4:38 PM   12/21/2019   [facility]       Gene Bowling PA  12/21/19 [id number]

## 2019-12-21 NOTE — ED TRIAGE NOTES
"Pt presents today with c/o dental pain on right side, also complains of sore throat. Started last Saturday. Saw dentist on Monday, was staretd on clindamycin. \"not getting better\".   "

## 2019-12-21 NOTE — ED AVS SNAPSHOT
[facility]  [address] [address]  Phone:  [phone]                                    Suzy Gallardo   MRN: [id number]    Department:  [facility]   Date of Visit:  12/21/2019           After Visit Summary Signature Page    I have received my discharge instructions, and my questions have been answered. I have discussed any challenges I see with this plan with the nurse or doctor.    ..........................................................................................................................................  Patient/Patient Representative Signature      ..........................................................................................................................................  Patient Representative Print Name and Relationship to Patient    ..................................................               ................................................  Date                                   Time    ..........................................................................................................................................  Reviewed by Signature/Title    ...................................................              ..............................................  Date                                               Time          22EPIC Rev 08/18

## 2019-12-23 LAB
BACTERIA SPEC CULT: NORMAL
SPECIMEN SOURCE: NORMAL

## 2020-01-10 ENCOUNTER — MYC MEDICAL ADVICE (OUTPATIENT)
Dept: FAMILY MEDICINE | Facility: OTHER | Age: 60
End: 2020-01-10

## 2020-01-10 DIAGNOSIS — F34.1 DYSTHYMIA: ICD-10-CM

## 2020-01-10 RX ORDER — VENLAFAXINE HYDROCHLORIDE 75 MG/1
CAPSULE, EXTENDED RELEASE ORAL
Qty: 180 CAPSULE | Refills: 0 | Status: SHIPPED | OUTPATIENT
Start: 2020-01-10 | End: 2020-04-22 | Stop reason: DRUGHIGH

## 2020-01-16 DIAGNOSIS — E11.9 TYPE 2 DIABETES MELLITUS WITHOUT COMPLICATION, WITH LONG-TERM CURRENT USE OF INSULIN (H): ICD-10-CM

## 2020-01-16 DIAGNOSIS — Z79.4 TYPE 2 DIABETES MELLITUS WITHOUT COMPLICATION, WITH LONG-TERM CURRENT USE OF INSULIN (H): ICD-10-CM

## 2020-01-17 RX ORDER — INSULIN GLARGINE 100 [IU]/ML
53 INJECTION, SOLUTION SUBCUTANEOUS DAILY
Qty: 16 ML | Refills: 0 | Status: SHIPPED | OUTPATIENT
Start: 2020-01-17 | End: 2020-05-13

## 2020-01-20 NOTE — PROGRESS NOTES
Subjective     Suzy Gallardo is a 59 year old female who presents to clinic today for the following health issues:    HPI   Diabetes Follow-up    How often are you checking your blood sugar? One time daily  What time of day are you checking your blood sugars (select all that apply)?  7:00 AM   Have you had any blood sugars above 200?  No  Have you had any blood sugars below 70?  No    What symptoms do you notice when your blood sugar is low?  Shaky and Weak On rare occasion     What concerns do you have today about your diabetes? None     Do you have any of these symptoms? (Select all that apply)  No numbness or tingling in feet.  No redness, sores or blisters on feet.  No complaints of excessive thirst.  No reports of blurry vision.  No significant changes to weight.      BP Readings from Last 2 Encounters:   12/21/19 154/73   10/01/19 128/74     Hemoglobin A1C (%)   Date Value   10/01/2019 8.5 (H)   05/07/2019 9.5 (H)     LDL Cholesterol Calculated (mg/dL)   Date Value   05/07/2019 76   10/24/2018 31         Hypertension Follow-up      Do you check your blood pressure regularly outside of the clinic? No     Are you following a low salt diet? Yes    Are your blood pressures ever more than 140 on the top number (systolic) OR more   than 90 on the bottom number (diastolic), for example 140/90? No      PROBLEMS TO ADD ON...    Patient Active Problem List   Diagnosis     [place] disease      Cellulitis     Annual physical exam     Morbid obesity (H)     Disorder of lipoid metabolism     Status post hysterectomy     ACP (advance care planning)     Type 2 diabetes mellitus without complication, with long-term current use of insulin (H)     Depression     Mixed hyperlipidemia     Elevated liver function tests     Abnormal stress test     Chest discomfort     Benign essential hypertension     Hepatic steatosis     Anxiety     Past Surgical History:   Procedure Laterality Date     BREAST SURGERY      breast biopsy     C  "TOTAL ABDOM HYSTERECTOMY  07/01/2009    Dr Martinez, no cervix     COLONOSCOPY  5/3/2013    Procedure: COLONOSCOPY;  COLONOSCOPY;  Surgeon: Pablito Urbina MD;  Location: [facility]     ENDOMETRIAL SAMPLING (BIOPSY)       LAPAROSCOPY       NOSE SURGERY      birth edilberto removed     wisdom teeth removed         Social History     Tobacco Use     Smoking status: Never Smoker     Smokeless tobacco: Never Used   Substance Use Topics     Alcohol use: No     Family History   Problem Relation Age of Onset     Stomach Cancer Brother      C.A.D. Father 40        s/p quad bypass     Diabetes Type 2  Brother      Hypertension Brother      Hypertension Other      Chronic Obstructive Pulmonary Disease Mother      Lung Cancer Mother      Bladder Cancer Mother          Current Outpatient Medications   Medication Sig Dispense Refill     ASPIRIN PO Take 81 mg by mouth daily        Blood Glucose Monitoring Suppl (ONE TOUCH ULTRA) 1 each strip TID AC       calcium-vitamin D (CALTRATE) 600-400 MG-UNIT per tablet Take 1 tablet by mouth daily.       furosemide (LASIX) 40 MG tablet TAKE 1 TABLET (40 MG) BY MOUTH 2 TIMES DAILY 180 tablet 1     IBUPROFEN PO        insulin glargine (BASAGLAR KWIKPEN) 100 UNIT/ML pen Inject 53 Units Subcutaneous daily 16 mL 0     Insulin Pen Needle (BD U/F III SHORT PEN NEEDLE) See instructions.       Insulin Pen Needle (PEN NEEDLES 5/16\") 31G X 8 MM MISC        metFORMIN (GLUCOPHAGE) 1000 MG tablet TAKE 1 TABLET BY MOUTH TWICE DAILY WITH MEALS. 180 tablet 3     simvastatin (ZOCOR) 40 MG tablet TAKE 1 TABLET BY MOUTH ONCE DAILY 90 tablet 1     venlafaxine (EFFEXOR-XR) 75 MG 24 hr capsule Take 2 pills daily 180 capsule 0     liraglutide (VICTOZA) 18 MG/3ML solution Inject 1.8 mg Subcutaneous daily 27 mL 3     No Known Allergies    PROBLEMS TO ADD ON...  Reviewed and updated as needed this visit by Provider         Review of Systems   ROS COMP: Constitutional, HEENT, cardiovascular, pulmonary, gi and gu systems are " "negative, except as otherwise noted.      Objective    There were no vitals taken for this visit.  There is no height or weight on file to calculate BMI.  Physical Exam   GENERAL: healthy, alert and no distress  NECK: no adenopathy, no asymmetry, masses, or scars and thyroid normal to palpation  RESP: lungs clear to auscultation - no rales, rhonchi or wheezes  CV: regular rate and rhythm, normal S1 S2, no S3 or S4, no murmur, click or rub, no peripheral edema and peripheral pulses strong  ABDOMEN: soft, nontender, no hepatosplenomegaly, no masses and bowel sounds normal  MS: no gross musculoskeletal defects noted, no edema    Diagnostic Test Results:  Labs reviewed in Epic        Assessment & Plan       ICD-10-CM    1. Type 2 diabetes mellitus without complication, with long-term current use of insulin (H) E11.9 Hemoglobin A1c    Z79.4 Basic metabolic panel   2. Mixed hyperlipidemia E78.2    3. Benign essential hypertension I10      Nice review today.  Doing ok overall.  Last a1c was high.  Traditionally better.  With the holidays it might not be much better but we'll see.  We reviewed lipids at some length today.  We didn't recheck today but will get next time.  Diet/ex.  Stable bp.  Update dm labs.  F/u routine.     BMI:   Estimated body mass index is 34.7 kg/m  as calculated from the following:    Height as of this encounter: 1.575 m (5' 2\").    Weight as of this encounter: 86 kg (189 lb 11.2 oz).               No follow-ups on file.    Nathanael Mena MD  [facility]      "

## 2020-01-30 ENCOUNTER — OFFICE VISIT (OUTPATIENT)
Dept: FAMILY MEDICINE | Facility: OTHER | Age: 60
End: 2020-01-30
Attending: FAMILY MEDICINE
Payer: COMMERCIAL

## 2020-01-30 VITALS
BODY MASS INDEX: 34.91 KG/M2 | DIASTOLIC BLOOD PRESSURE: 70 MMHG | WEIGHT: 189.7 LBS | SYSTOLIC BLOOD PRESSURE: 120 MMHG | HEART RATE: 70 BPM | HEIGHT: 62 IN | OXYGEN SATURATION: 96 %

## 2020-01-30 DIAGNOSIS — E11.9 TYPE 2 DIABETES MELLITUS WITHOUT COMPLICATION, WITH LONG-TERM CURRENT USE OF INSULIN (H): Primary | ICD-10-CM

## 2020-01-30 DIAGNOSIS — E78.2 MIXED HYPERLIPIDEMIA: ICD-10-CM

## 2020-01-30 DIAGNOSIS — I10 BENIGN ESSENTIAL HYPERTENSION: ICD-10-CM

## 2020-01-30 DIAGNOSIS — Z79.4 TYPE 2 DIABETES MELLITUS WITHOUT COMPLICATION, WITH LONG-TERM CURRENT USE OF INSULIN (H): Primary | ICD-10-CM

## 2020-01-30 LAB
ANION GAP SERPL CALCULATED.3IONS-SCNC: 6 MMOL/L (ref 3–14)
BUN SERPL-MCNC: 10 MG/DL (ref 7–30)
CALCIUM SERPL-MCNC: 8.8 MG/DL (ref 8.5–10.1)
CHLORIDE SERPL-SCNC: 104 MMOL/L (ref 94–109)
CO2 SERPL-SCNC: 30 MMOL/L (ref 20–32)
CREAT SERPL-MCNC: 0.58 MG/DL (ref 0.52–1.04)
EST. AVERAGE GLUCOSE BLD GHB EST-MCNC: 203 MG/DL
GFR SERPL CREATININE-BSD FRML MDRD: >90 ML/MIN/{1.73_M2}
GLUCOSE SERPL-MCNC: 117 MG/DL (ref 70–99)
HBA1C MFR BLD: 8.7 % (ref 0–5.6)
POTASSIUM SERPL-SCNC: 3.4 MMOL/L (ref 3.4–5.3)
SODIUM SERPL-SCNC: 140 MMOL/L (ref 133–144)

## 2020-01-30 PROCEDURE — 83036 HEMOGLOBIN GLYCOSYLATED A1C: CPT | Performed by: FAMILY MEDICINE

## 2020-01-30 PROCEDURE — 90715 TDAP VACCINE 7 YRS/> IM: CPT | Performed by: FAMILY MEDICINE

## 2020-01-30 PROCEDURE — 99214 OFFICE O/P EST MOD 30 MIN: CPT | Mod: 25 | Performed by: FAMILY MEDICINE

## 2020-01-30 PROCEDURE — 36415 COLL VENOUS BLD VENIPUNCTURE: CPT | Performed by: FAMILY MEDICINE

## 2020-01-30 PROCEDURE — 90471 IMMUNIZATION ADMIN: CPT | Performed by: FAMILY MEDICINE

## 2020-01-30 PROCEDURE — 80048 BASIC METABOLIC PNL TOTAL CA: CPT | Performed by: FAMILY MEDICINE

## 2020-01-30 ASSESSMENT — PAIN SCALES - GENERAL: PAINLEVEL: NO PAIN (1)

## 2020-01-30 ASSESSMENT — MIFFLIN-ST. JEOR: SCORE: 1398.72

## 2020-01-30 NOTE — NURSING NOTE
"Chief Complaint   Patient presents with     Diabetes       Initial /70 (BP Location: Left arm, Patient Position: Chair, Cuff Size: Adult Large)   Pulse 70   Ht 1.575 m (5' 2\")   Wt 86 kg (189 lb 11.2 oz)   SpO2 96%   BMI 34.70 kg/m   Estimated body mass index is 34.7 kg/m  as calculated from the following:    Height as of this encounter: 1.575 m (5' 2\").    Weight as of this encounter: 86 kg (189 lb 11.2 oz).  Medication Reconciliation: complete  Rima Sumner LPN  "

## 2020-02-03 DIAGNOSIS — E11.9 TYPE 2 DIABETES MELLITUS WITHOUT COMPLICATION, WITH LONG-TERM CURRENT USE OF INSULIN (H): Primary | ICD-10-CM

## 2020-02-03 DIAGNOSIS — Z79.4 TYPE 2 DIABETES MELLITUS WITHOUT COMPLICATION, WITH LONG-TERM CURRENT USE OF INSULIN (H): Primary | ICD-10-CM

## 2020-02-20 ENCOUNTER — HOSPITAL ENCOUNTER (EMERGENCY)
Facility: HOSPITAL | Age: 60
Discharge: HOME | End: 2020-02-20
Attending: NURSE PRACTITIONER | Admitting: NURSE PRACTITIONER
Payer: COMMERCIAL

## 2020-02-20 VITALS
RESPIRATION RATE: 20 BRPM | SYSTOLIC BLOOD PRESSURE: 148 MMHG | DIASTOLIC BLOOD PRESSURE: 69 MMHG | OXYGEN SATURATION: 96 % | BODY MASS INDEX: 34.04 KG/M2 | TEMPERATURE: 98.6 F | HEART RATE: 76 BPM | WEIGHT: 185 LBS | HEIGHT: 62 IN

## 2020-02-20 DIAGNOSIS — R05.9 COUGH: Primary | ICD-10-CM

## 2020-02-20 LAB
SPECIMEN SOURCE: NORMAL
STREP GROUP A PCR: NOT DETECTED

## 2020-02-20 PROCEDURE — 99213 OFFICE O/P EST LOW 20 MIN: CPT | Mod: Z6 | Performed by: NURSE PRACTITIONER

## 2020-02-20 PROCEDURE — G0463 HOSPITAL OUTPT CLINIC VISIT: HCPCS

## 2020-02-20 PROCEDURE — 87651 STREP A DNA AMP PROBE: CPT | Performed by: NURSE PRACTITIONER

## 2020-02-20 RX ORDER — PREDNISONE 20 MG/1
TABLET ORAL
Qty: 10 TABLET | Refills: 0 | Status: SHIPPED | OUTPATIENT
Start: 2020-02-20 | End: 2020-04-07

## 2020-02-20 RX ORDER — CODEINE PHOSPHATE AND GUAIFENESIN 10; 100 MG/5ML; MG/5ML
1-2 SOLUTION ORAL
Qty: 120 ML | Refills: 0 | Status: SHIPPED | OUTPATIENT
Start: 2020-02-20 | End: 2020-04-07

## 2020-02-20 RX ORDER — AZITHROMYCIN 250 MG/1
TABLET, FILM COATED ORAL
Qty: 6 TABLET | Refills: 0 | Status: SHIPPED | OUTPATIENT
Start: 2020-02-20 | End: 2020-04-07

## 2020-02-20 ASSESSMENT — ENCOUNTER SYMPTOMS
APPETITE CHANGE: 1
FEVER: 1
EYE PAIN: 0
EYE REDNESS: 0
HEADACHES: 1
COUGH: 1
VOMITING: 0
EYE DISCHARGE: 0
EYE ITCHING: 0
SHORTNESS OF BREATH: 1
FATIGUE: 1
DIARRHEA: 0

## 2020-02-20 ASSESSMENT — MIFFLIN-ST. JEOR: SCORE: 1377.4

## 2020-02-20 NOTE — ED AVS SNAPSHOT
[facility]  [address] [address]  Phone:  [phone]                                    Suzy Gallardo   MRN: [id number]    Department:  [facility]   Date of Visit:  2/20/2020           After Visit Summary Signature Page    I have received my discharge instructions, and my questions have been answered. I have discussed any challenges I see with this plan with the nurse or doctor.    ..........................................................................................................................................  Patient/Patient Representative Signature      ..........................................................................................................................................  Patient Representative Print Name and Relationship to Patient    ..................................................               ................................................  Date                                   Time    ..........................................................................................................................................  Reviewed by Signature/Title    ...................................................              ..............................................  Date                                               Time          22EPIC Rev 08/18

## 2020-02-21 NOTE — ED TRIAGE NOTES
Hx of sore throat started last Friday, fever of 101-102 when not on tylenol/iburprofen (last tylenol 1000mg at 1700). Now is hoarse, productive cough of yellow sputum,  And chest congestion.

## 2020-02-21 NOTE — ED NOTES
Patient discharged with understanding of instructions and recommendations.  Denies any questions or concerns.     Ibis Black LPN on 2/20/2020 at 8:01 PM

## 2020-02-21 NOTE — ED PROVIDER NOTES
"  History     Chief Complaint   Patient presents with     Pharyngitis     sore throat started last Friday, now cough and congestion and fever     HPI  Suzy Gallardo is a 59 year old female who presents ambulatory with concerns of sore throat that started last Friday- has improved. Coughing started on Saturday/Sunday - worse. Fever 102 last weekend, since Tuesday 100-101. Took Tylenol at 1730. She does have postnasal drainage. Denies rhinorrhea, nasal congestion.   She has tried rest, water, ibuprofen, Tylenol, OTC cough/medication - no improvement. She has a heaviness in her chest \"like there is a pressure and when I cough it hurts sometimes. When I breathe I can hear like a crackling.\"          Allergies:  No Known Allergies    Problem List:    Patient Active Problem List    Diagnosis Date Noted     Abnormal stress test 12/17/2018     Priority: Medium     Chest discomfort 12/17/2018     Priority: Medium     Benign essential hypertension 12/17/2018     Priority: Medium     Hepatic steatosis 12/17/2018     Priority: Medium     Anxiety 12/17/2018     Priority: Medium     Elevated liver function tests 04/17/2018     Priority: Medium     ACP (advance care planning) 08/03/2016     Priority: Medium     Advance Care Planning 8/3/2016: ACP Review of Chart / Resources Provided:  Reviewed chart for advance care plan.  Suzy Gallardo has been provided information and resources to begin or update their advance care plan.  Added by Sisi Link               Type 2 diabetes mellitus without complication, with long-term current use of insulin (H) 08/03/2016     Priority: Medium     Depression 04/13/2016     Priority: Medium     Mixed hyperlipidemia 04/13/2016     Priority: Medium     Status post hysterectomy 01/13/2015     Priority: Medium     Annual physical exam 01/07/2014     Priority: Medium     Morbid obesity (H) 01/07/2014     Priority: Medium     Overview:   Initial visit 4/13/16 initial  weight  198  Goal weight 150 " BMI of 25     BMR 1460  Low activity  1752 Light activity 2007 Moderate activity 2263  High activity 2518    Calorie goal: 1200 to 1400   Carb goal :50 gm net  Protein goal: 75       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 Provider to review       Cellulitis 10/31/2013     Priority: Medium     [place] disease  03/01/2012     Priority: Medium     Nathanael Mena MD           Past Medical History:    Past Medical History:   Diagnosis Date     Anemia, unspecified 06/23/2004     Depressive disorder, not elsewhere classified 01/19/2004     Diabetes mellitus without mention of complication, 11/30/2001     Endometriosis of uterus 12/19/2001     [place] disease 03/01/2012     Other (abnormal) findings on radiological examination of breast 08/15/2002     Pure hypercholesterolemia 11/26/1999     Sterilization 12/04/2001       Past Surgical History:    Past Surgical History:   Procedure Laterality Date     BREAST SURGERY      breast biopsy     C TOTAL ABDOM HYSTERECTOMY  07/01/2009    Dr Martinez, no cervix     COLONOSCOPY  5/3/2013    Procedure: COLONOSCOPY;  COLONOSCOPY;  Surgeon: Pablito Urbina MD;  Location: [facility]     ENDOMETRIAL SAMPLING (BIOPSY)       LAPAROSCOPY       NOSE SURGERY      birth edilberto removed     wisdom teeth removed         Family History:    Family History   Problem Relation Age of Onset     Stomach Cancer Brother      C.A.D. Father 40        s/p quad bypass     Diabetes Type 2  Brother      Hypertension Brother      Hypertension Other      Chronic Obstructive Pulmonary Disease Mother      Lung Cancer Mother      Bladder Cancer Mother        Social History:  Marital Status:   [2]  Social History     Tobacco Use     Smoking status: Never Smoker     Smokeless tobacco: Never Used   Substance Use Topics     Alcohol use: No     Drug use: No        Medications:    ASPIRIN PO  azithromycin (ZITHROMAX Z-KHURRAM) 250 MG PO tablet  Blood  "Glucose Monitoring Suppl (ONE TOUCH ULTRA)  calcium-vitamin D (CALTRATE) 600-400 MG-UNIT per tablet  furosemide (LASIX) 40 MG tablet  guaiFENesin-codeine 100-10 MG/5ML PO solution  IBUPROFEN PO  insulin glargine (BASAGLAR KWIKPEN) 100 UNIT/ML pen  Insulin Pen Needle (PEN NEEDLES 5/16\") 31G X 8 MM MISC  metFORMIN (GLUCOPHAGE) 1000 MG tablet  predniSONE 20 MG PO tablet  simvastatin (ZOCOR) 40 MG tablet  venlafaxine (EFFEXOR-XR) 75 MG 24 hr capsule  Insulin Pen Needle (BD U/F III SHORT PEN NEEDLE)          Review of Systems   Constitutional: Positive for appetite change, fatigue and fever.   HENT: Positive for congestion and postnasal drip. Negative for ear pain.    Eyes: Negative for pain, discharge, redness and itching.   Respiratory: Positive for cough and shortness of breath.    Cardiovascular: Negative for chest pain.   Gastrointestinal: Negative for diarrhea and vomiting.   Skin: Negative for rash.   Neurological: Positive for headaches.       Physical Exam   BP: 148/69  Pulse: 76  Temp: 98.6  F (37  C)  Resp: 20  Height: 157.5 cm (5' 2\")  Weight: 83.9 kg (185 lb)(stated)  SpO2: 96 %      Physical Exam  Constitutional:       General: She is not in acute distress.     Appearance: She is ill-appearing. She is not toxic-appearing or diaphoretic.   HENT:      Head: Normocephalic and atraumatic.      Right Ear: Tympanic membrane, ear canal and external ear normal.      Left Ear: Tympanic membrane, ear canal and external ear normal.      Nose: Nose normal.      Mouth/Throat:      Lips: Pink.      Mouth: Mucous membranes are moist.      Pharynx: Oropharynx is clear. Uvula midline. No pharyngeal swelling, oropharyngeal exudate or posterior oropharyngeal erythema.   Eyes:      General: Lids are normal.      Conjunctiva/sclera: Conjunctivae normal.      Pupils: Pupils are equal, round, and reactive to light.   Neck:      Musculoskeletal: Neck supple.   Cardiovascular:      Rate and Rhythm: Normal rate and regular rhythm. "      Heart sounds: S1 normal and S2 normal. No murmur. No friction rub. No gallop.    Pulmonary:      Effort: Pulmonary effort is normal. No tachypnea or respiratory distress.      Breath sounds: Examination of the left-lower field reveals rales. Rales present. No wheezing or rhonchi.      Comments: Frequent, barking cough  Lymphadenopathy:      Cervical: No cervical adenopathy.   Skin:     General: Skin is warm and dry.      Capillary Refill: Capillary refill takes less than 2 seconds.      Coloration: Skin is not pale.      Findings: No rash.   Neurological:      Mental Status: She is alert and oriented to person, place, and time.   Psychiatric:         Mood and Affect: Mood normal.         Speech: Speech normal.         Behavior: Behavior normal. Behavior is cooperative.         ED Course        Procedures      Results for orders placed or performed during the hospital encounter of 02/20/20 (from the past 24 hour(s))   Group A Streptococcus PCR Throat Swab   Result Value Ref Range    Specimen Description Throat     Strep Group A PCR Not Detected NDET^Not Detected       Medications - No data to display    Assessments & Plan (with Medical Decision Making)     I have reviewed the nursing notes.    I have reviewed the findings, diagnosis, plan and need for follow up with the patient.  (R05) Cough  (primary encounter diagnosis)  Comment: acute, symptomatic  Plan: Rapid strep negative.  Symptoms most consistent with influenza like illness.  Given worsening cough and exam findings of crackles in left lower lung field - will start prednisone and azithromycin.   Prednisone may increase blood glucose levels - monitor carbohydrate intake.       -- Symptomatic treatments recommended.  -Discussed that antibiotics would not help symptoms of viral URI. Education provided on symptoms of secondary bacterial infection such as new fever, chills, rigors, shortness of breath, increased work of breathing, that can occur with viral URI  and need for further evaluation, if they occur.   - Ensure you are staying hydrated by drinking plenty of fluids or eating foods such as popsicles, jello, pudding.  - Honey can be soothing for sore throat  - Warm salt water gurgles can help soothe sore throat  - Rest  - Humidifier can help with congestion and help keep mucus membranes such as throat and nose from drying out.  - Sleeping slightly propped up can help with congestion and postnasal drainage that can worsen cough at bedtime.  - As long as you have never been told to take Tylenol and/or Ibuprofen you can use them to manage fever and body aches per package instructions  Make sure you eat when you take ibuprofen to avoid stomach upset.  - OTC cough medications per package instructions to help with cough. Check to see if the cough/cold medication already has acetaminophen (Tylenol) in it. If it does avoid taking additional Tylenol.  - If sudden onset of new fever, worsening symptoms return for further evaluation.  - OTC nasal steroid such as Flonase can help decrease sinus inflammation to help with congestion.  - Education provided on symptoms of post-viral bacterial infections including ear infection and pneumonia. This would require re-evaluation for treatment.        RETURN TO THE ED WITH NEW OR WORSENING SYMPTOMS.    FOLLOW-UP WITH YOUR PRIMARY CARE PROVIDER IN 7-10 DAYS.    Discharge Medication List as of 2/20/2020  7:55 PM      START taking these medications    Details   azithromycin (ZITHROMAX Z-KHURRAM) 250 MG PO tablet Two tablets on the first day, then one tablet daily for the next 4 days, Disp-6 tablet, R-0, E-Prescribe      guaiFENesin-codeine 100-10 MG/5ML PO solution Take 5-10 mLs by mouth nightly as needed for cough, Disp-120 mL, R-0, E-Prescribe      predniSONE 20 MG PO tablet Take two tablets (= 40mg) each day for 5 (five) days, Disp-10 tablet, R-0, E-Prescribe             Final diagnoses:   Cough       2/20/2020   [facility]      Brandie Meza, CNP  02/20/20 [id number]

## 2020-02-21 NOTE — DISCHARGE INSTRUCTIONS
(R05) Cough  (primary encounter diagnosis)  Comment: acute, symptomatic  Plan: Rapid strep negative.  Symptoms most consistent with influenza like illness.  Given worsening cough and exam findings of crackles in left lower lung field - will start prednisone and azithromycin.   Prednisone may increase blood glucose levels - monitor carbohydrate intake.       -- Symptomatic treatments recommended.  -Discussed that antibiotics would not help symptoms of viral URI. Education provided on symptoms of secondary bacterial infection such as new fever, chills, rigors, shortness of breath, increased work of breathing, that can occur with viral URI and need for further evaluation, if they occur.   - Ensure you are staying hydrated by drinking plenty of fluids or eating foods such as popsicles, jello, pudding.  - Honey can be soothing for sore throat  - Warm salt water gurgles can help soothe sore throat  - Rest  - Humidifier can help with congestion and help keep mucus membranes such as throat and nose from drying out.  - Sleeping slightly propped up can help with congestion and postnasal drainage that can worsen cough at bedtime.  - As long as you have never been told to take Tylenol and/or Ibuprofen you can use them to manage fever and body aches per package instructions  Make sure you eat when you take ibuprofen to avoid stomach upset.  - OTC cough medications per package instructions to help with cough. Check to see if the cough/cold medication already has acetaminophen (Tylenol) in it. If it does avoid taking additional Tylenol.  - If sudden onset of new fever, worsening symptoms return for further evaluation.  - OTC nasal steroid such as Flonase can help decrease sinus inflammation to help with congestion.  - Education provided on symptoms of post-viral bacterial infections including ear infection and pneumonia. This would require re-evaluation for treatment.        RETURN TO THE ED WITH NEW OR WORSENING  SYMPTOMS.    FOLLOW-UP WITH YOUR PRIMARY CARE PROVIDER IN 7-10 DAYS.      Brandie Meza, CNP

## 2020-02-21 NOTE — ED TRIAGE NOTES
Patient presents today with c/o URI symptoms and sore throat.   Ongoing for one week.   Experiencing cough, hoarse voice/loss of voice, headaches, vomiting after coughing, sore throat pain, chest wall heaviness, fatigue, difficulty sleeping, fevers (101)  Denies congestion, facial pain/pressure, ear pain, nausea, diarrhea, myalgia, chills.  Normal appetite.   Normal bowel/bladder  Tylenol / ibuprofen - helps some.   Nonsmoker

## 2020-02-24 ENCOUNTER — TELEPHONE (OUTPATIENT)
Dept: EDUCATION SERVICES | Facility: HOSPITAL | Age: 60
End: 2020-02-24

## 2020-02-24 ENCOUNTER — HOSPITAL ENCOUNTER (OUTPATIENT)
Dept: EDUCATION SERVICES | Facility: HOSPITAL | Age: 60
Discharge: HOME | End: 2020-02-24
Attending: FAMILY MEDICINE | Admitting: FAMILY MEDICINE
Payer: COMMERCIAL

## 2020-02-24 VITALS
RESPIRATION RATE: 16 BRPM | HEIGHT: 62 IN | DIASTOLIC BLOOD PRESSURE: 67 MMHG | SYSTOLIC BLOOD PRESSURE: 125 MMHG | HEART RATE: 66 BPM | WEIGHT: 190.1 LBS | BODY MASS INDEX: 34.98 KG/M2 | OXYGEN SATURATION: 99 %

## 2020-02-24 DIAGNOSIS — I10 BENIGN ESSENTIAL HYPERTENSION: ICD-10-CM

## 2020-02-24 DIAGNOSIS — Z79.4 TYPE 2 DIABETES MELLITUS WITHOUT COMPLICATION, WITH LONG-TERM CURRENT USE OF INSULIN (H): Primary | ICD-10-CM

## 2020-02-24 DIAGNOSIS — Z79.4 TYPE 2 DIABETES MELLITUS WITH HYPERGLYCEMIA, WITH LONG-TERM CURRENT USE OF INSULIN (H): Primary | ICD-10-CM

## 2020-02-24 DIAGNOSIS — E11.9 TYPE 2 DIABETES MELLITUS WITHOUT COMPLICATION, WITH LONG-TERM CURRENT USE OF INSULIN (H): Primary | ICD-10-CM

## 2020-02-24 DIAGNOSIS — E11.65 TYPE 2 DIABETES MELLITUS WITH HYPERGLYCEMIA, WITH LONG-TERM CURRENT USE OF INSULIN (H): Primary | ICD-10-CM

## 2020-02-24 PROCEDURE — G0108 DIAB MANAGE TRN  PER INDIV: HCPCS

## 2020-02-24 RX ORDER — LIRAGLUTIDE 6 MG/ML
1.8 INJECTION SUBCUTANEOUS DAILY
COMMUNITY
End: 2020-04-07 | Stop reason: ALTCHOICE

## 2020-02-24 ASSESSMENT — PAIN SCALES - GENERAL: PAINLEVEL: NO PAIN (0)

## 2020-02-24 ASSESSMENT — MIFFLIN-ST. JEOR: SCORE: 1396.57

## 2020-02-24 NOTE — LETTER
"    2/24/2020        RE: Suzy Gallardo  [address] [facility] [address]        Diabetes Self-Management Education & Support    Presents for: Individual review    SUBJECTIVE/OBJECTIVE:  Presents for: Individual review  Accompanied by: Self  Diabetes education in the past 24mo: No  Diabetes type: Type 2  Disease course: Getting harder to manage  Diabetes management related comments/concerns: elevated A1C  Transportation concerns: No  Difficulty affording diabetes medication?: No  Difficulty affording diabetes testing supplies?: No  Other concerns:: None  Cultural Influences/Ethnic Background:  American      Diabetes Symptoms & Complications:  Fatigue: Yes  Neuropathy: No  Polydipsia: No  Polyphagia: No  Polyuria: No  Visual change: No  Slow healing wounds: No  Symptom course: Stable  Weight trend: Increasing  Autonomic neuropathy: No  CVA: No  Heart disease: Other(abnormal stress test)  Nephropathy: No  Peripheral neuropathy: No  Peripheral Vascular Disease: No  Retinopathy: No    Patient Problem List and Family Medical History reviewed for relevant medical history, current medical status, and diabetes risk factors.    Vitals:  /67   Pulse 66   Resp 16   Ht 1.568 m (5' 1.75\")   Wt 86.2 kg (190 lb 1.6 oz)   SpO2 99%   BMI 35.05 kg/m     Estimated body mass index is 35.05 kg/m  as calculated from the following:    Height as of this encounter: 1.568 m (5' 1.75\").    Weight as of this encounter: 86.2 kg (190 lb 1.6 oz).   Last 3 BP:   BP Readings from Last 3 Encounters:   02/24/20 125/67   02/20/20 148/69   01/30/20 120/70       History   Smoking Status     Never Smoker   Smokeless Tobacco     Never Used       Labs:  Lab Results   Component Value Date    A1C 8.7 01/30/2020     Lab Results   Component Value Date     01/30/2020     Lab Results   Component Value Date    LDL 76 05/07/2019     HDL Cholesterol   Date Value Ref Range Status   05/07/2019 31 (L) >49 mg/dL Final   ]  GFR Estimate   Date Value " Ref Range Status   01/30/2020 >90 >60 mL/min/[1.73_m2] Final     Comment:     Non  GFR Calc  Starting 12/18/2018, serum creatinine based estimated GFR (eGFR) will be   calculated using the Chronic Kidney Disease Epidemiology Collaboration   (CKD-EPI) equation.       GFR Estimate If Black   Date Value Ref Range Status   01/30/2020 >90 >60 mL/min/[1.73_m2] Final     Comment:      GFR Calc  Starting 12/18/2018, serum creatinine based estimated GFR (eGFR) will be   calculated using the Chronic Kidney Disease Epidemiology Collaboration   (CKD-EPI) equation.       Lab Results   Component Value Date    CR 0.58 01/30/2020     No results found for: MICROALBUMIN    Healthy Eating:  Healthy Eating Assessed Today: Yes  Cultural/Buddhism diet restrictions?: No  Meal planning/habits: None  How many times a week on average do you eat food made away from home (restaurant/take-out)?: 3  Meals include: Breakfast, Lunch, Dinner  Breakfast: toast or bagel with cream cheese or oatmeal with stevia  Lunch: Soup or leftovers or frozen entrees, no sgar fruit  Dinner: Eat out: burgers or shrimp and fries or salad - comfort food  Snacks: fruit  Beverages: Tea, Water, Other, Diet soda(iced tea, flavored water)  Has patient met with a dietitian in the past?: Yes      Being Active:  Being Active Assessed Today: Yes  Exercise:: Currently not exercising  Barrier to exercise: None    Monitoring:  Monitoring Assessed Today: No  Did patient bring glucose meter to appointment? : No        Taking Medications:  Diabetes Medication(s)     Biguanides       metFORMIN (GLUCOPHAGE) 1000 MG tablet    TAKE 1 TABLET BY MOUTH TWICE DAILY WITH MEALS.    Insulin       insulin glargine (BASAGLAR KWIKPEN) 100 UNIT/ML pen    Inject 53 Units Subcutaneous daily    Incretin Mimetic Agents (GLP-1 Receptor Agonists)       liraglutide (VICTOZA) 18 MG/3ML solution    Inject 1.8 mg Subcutaneous daily          Taking Medication Assessed Today:  Yes  Current Treatments: Insulin Injections, Non-insulin Injectables, Oral Medication (taken by mouth)  Dose schedule: Pre-breakfast, At bedtime  Given by: Patient  Injection/Infusion sites: Abdomen  Problems taking diabetes medications regularly?: No  Diabetes medication side effects?: No    Problem Solving:  Problem Solving Assessed Today: Yes  Does patient have severe weather/disaster plan for diabetes management?: No  Does patient have sick day plan for diabetes management?: No              Reducing Risks:  Reducing Risks Assessed Today: Yes  CAD Risks: Diabetes Mellitus, Family history, Obesity, Post-menopausal, Sedentary lifestyle  Has dilated eye exam at least once a year?: Yes  Feet checked by healthcare provider in the last year?: Yes    Healthy Coping:  Healthy Coping Assessed Today: Yes  Emotional response to diabetes: Confidence diabetes can be controlled, Concern for health and well-being  Informal Support system:: Family, Spouse  Stage of change: PREPARATION (Decided to change - considering how)  Support resources: Websites  Patient Activation Measure Survey Score:  NOHELIA Score (Last Two) 10/1/2019   NOHELIA Raw Score 33   Activation Score 65.8   NOHELIA Level 3       Diabetes knowledge and skills assessment:   Patient is knowledgeable in diabetes management concepts related to: Healthy Eating, Being Active, Monitoring and Taking Medication    Patient needs further education on the following diabetes management concepts: Healthy Eating, Being Active, Monitoring and Taking Medication    Based on learning assessment above, most appropriate setting for further diabetes education would be: Individual setting.      INTERVENTIONS:    Education provided today on:  AADE Self-Care Behaviors:  Diabetes Pathophysiology  Healthy Eating: carbohydrate counting, consistency in amount, composition, and timing of food intake, portion control and keeping food logs: written or My Fitness Pal  Being Active: relationship to blood  glucose, describe appropriate activity program and precautions to take  Monitoring: log and interpret results, individual blood glucose targets and frequency of monitoring  Taking Medication: action of prescribed medication, drawing up, administering and storing injectable diabetes medications, proper site selection and rotation for injections, side effects of prescribed medications and when to take medications    Opportunities for ongoing education and support in diabetes-self management were discussed.    Pt verbalized understanding of concepts discussed and recommendations provided today.       Education Materials Provided:  Carbohydrate Counting, Fast Food Guide and My Food Plan, Carb Counting Booklet, food logs      ASSESSMENT:  Suzy did not bring her meter today. Suzy has had diabetes since she was 42 years old. Father had a history of diabetes.  She is here to get back on track. Mother passed away last year and Suzy stopped testing, exercising and following food plan.    Patient's most recent   Lab Results   Component Value Date    A1C 8.7 01/30/2020    is not meeting goal of <8.0    PLAN  See Patient Instructions for co-developed, patient-stated behavior change goals.  Keep food logs    Check BG every AM before food and 2 hours after evening meal    I will ask Dr. Mena about changing from Victoza to Ozempic    Return to Diabetes Ed on 3/23/2020 at 9:00 am    Call with any questions or concerns  AVS printed and provided to patient today. See Follow-Up section for recommended follow-up.      Time Spent: 45 minutes  Encounter Type: Individual    Any diabetes medication dose changes were made via the CDE Protocol and Collaborative Practice Agreement with the patient's primary care provider. A copy of this encounter was shared with the provider.          Sincerely,        Rayna Gilman RN

## 2020-02-24 NOTE — PATIENT INSTRUCTIONS
Keep food logs    Check BG every AM before food and 2 hours after evening meal    I will ask Dr. Mena about changing from Victoza to Ozempic    Return to Diabetes Ed on 3/23/2020 at 9:00 am    Call with any questions or concerns

## 2020-02-24 NOTE — PROGRESS NOTES
"Diabetes Self-Management Education & Support    Presents for: Individual review    SUBJECTIVE/OBJECTIVE:  Presents for: Individual review  Accompanied by: Self  Diabetes education in the past 24mo: No  Diabetes type: Type 2  Disease course: Getting harder to manage  Diabetes management related comments/concerns: elevated A1C  Transportation concerns: No  Difficulty affording diabetes medication?: No  Difficulty affording diabetes testing supplies?: No  Other concerns:: None  Cultural Influences/Ethnic Background:  American      Diabetes Symptoms & Complications:  Fatigue: Yes  Neuropathy: No  Polydipsia: No  Polyphagia: No  Polyuria: No  Visual change: No  Slow healing wounds: No  Symptom course: Stable  Weight trend: Increasing  Autonomic neuropathy: No  CVA: No  Heart disease: Other(abnormal stress test)  Nephropathy: No  Peripheral neuropathy: No  Peripheral Vascular Disease: No  Retinopathy: No    Patient Problem List and Family Medical History reviewed for relevant medical history, current medical status, and diabetes risk factors.    Vitals:  /67   Pulse 66   Resp 16   Ht 1.568 m (5' 1.75\")   Wt 86.2 kg (190 lb 1.6 oz)   SpO2 99%   BMI 35.05 kg/m    Estimated body mass index is 35.05 kg/m  as calculated from the following:    Height as of this encounter: 1.568 m (5' 1.75\").    Weight as of this encounter: 86.2 kg (190 lb 1.6 oz).   Last 3 BP:   BP Readings from Last 3 Encounters:   02/24/20 125/67   02/20/20 148/69   01/30/20 120/70       History   Smoking Status     Never Smoker   Smokeless Tobacco     Never Used       Labs:  Lab Results   Component Value Date    A1C 8.7 01/30/2020     Lab Results   Component Value Date     01/30/2020     Lab Results   Component Value Date    LDL 76 05/07/2019     HDL Cholesterol   Date Value Ref Range Status   05/07/2019 31 (L) >49 mg/dL Final   ]  GFR Estimate   Date Value Ref Range Status   01/30/2020 >90 >60 mL/min/[1.73_m2] Final     Comment:     Non "  GFR Calc  Starting 12/18/2018, serum creatinine based estimated GFR (eGFR) will be   calculated using the Chronic Kidney Disease Epidemiology Collaboration   (CKD-EPI) equation.       GFR Estimate If Black   Date Value Ref Range Status   01/30/2020 >90 >60 mL/min/[1.73_m2] Final     Comment:      GFR Calc  Starting 12/18/2018, serum creatinine based estimated GFR (eGFR) will be   calculated using the Chronic Kidney Disease Epidemiology Collaboration   (CKD-EPI) equation.       Lab Results   Component Value Date    CR 0.58 01/30/2020     No results found for: MICROALBUMIN    Healthy Eating:  Healthy Eating Assessed Today: Yes  Cultural/Holiness diet restrictions?: No  Meal planning/habits: None  How many times a week on average do you eat food made away from home (restaurant/take-out)?: 3  Meals include: Breakfast, Lunch, Dinner  Breakfast: toast or bagel with cream cheese or oatmeal with stevia  Lunch: Soup or leftovers or frozen entrees, no sgar fruit  Dinner: Eat out: burgers or shrimp and fries or salad - comfort food  Snacks: fruit  Beverages: Tea, Water, Other, Diet soda(iced tea, flavored water)  Has patient met with a dietitian in the past?: Yes      Being Active:  Being Active Assessed Today: Yes  Exercise:: Currently not exercising  Barrier to exercise: None    Monitoring:  Monitoring Assessed Today: No  Did patient bring glucose meter to appointment? : No        Taking Medications:  Diabetes Medication(s)     Biguanides       metFORMIN (GLUCOPHAGE) 1000 MG tablet    TAKE 1 TABLET BY MOUTH TWICE DAILY WITH MEALS.    Insulin       insulin glargine (BASAGLAR KWIKPEN) 100 UNIT/ML pen    Inject 53 Units Subcutaneous daily    Incretin Mimetic Agents (GLP-1 Receptor Agonists)       liraglutide (VICTOZA) 18 MG/3ML solution    Inject 1.8 mg Subcutaneous daily          Taking Medication Assessed Today: Yes  Current Treatments: Insulin Injections, Non-insulin Injectables, Oral  Medication (taken by mouth)  Dose schedule: Pre-breakfast, At bedtime  Given by: Patient  Injection/Infusion sites: Abdomen  Problems taking diabetes medications regularly?: No  Diabetes medication side effects?: No    Problem Solving:  Problem Solving Assessed Today: Yes  Does patient have severe weather/disaster plan for diabetes management?: No  Does patient have sick day plan for diabetes management?: No              Reducing Risks:  Reducing Risks Assessed Today: Yes  CAD Risks: Diabetes Mellitus, Family history, Obesity, Post-menopausal, Sedentary lifestyle  Has dilated eye exam at least once a year?: Yes  Feet checked by healthcare provider in the last year?: Yes    Healthy Coping:  Healthy Coping Assessed Today: Yes  Emotional response to diabetes: Confidence diabetes can be controlled, Concern for health and well-being  Informal Support system:: Family, Spouse  Stage of change: PREPARATION (Decided to change - considering how)  Support resources: Websites  Patient Activation Measure Survey Score:  NOHELIA Score (Last Two) 10/1/2019   NOHELIA Raw Score 33   Activation Score 65.8   NOHELIA Level 3       Diabetes knowledge and skills assessment:   Patient is knowledgeable in diabetes management concepts related to: Healthy Eating, Being Active, Monitoring and Taking Medication    Patient needs further education on the following diabetes management concepts: Healthy Eating, Being Active, Monitoring and Taking Medication    Based on learning assessment above, most appropriate setting for further diabetes education would be: Individual setting.      INTERVENTIONS:    Education provided today on:  AADE Self-Care Behaviors:  Diabetes Pathophysiology  Healthy Eating: carbohydrate counting, consistency in amount, composition, and timing of food intake, portion control and keeping food logs: written or My Fitness Pal  Being Active: relationship to blood glucose, describe appropriate activity program and precautions to  take  Monitoring: log and interpret results, individual blood glucose targets and frequency of monitoring  Taking Medication: action of prescribed medication, drawing up, administering and storing injectable diabetes medications, proper site selection and rotation for injections, side effects of prescribed medications and when to take medications    Opportunities for ongoing education and support in diabetes-self management were discussed.    Pt verbalized understanding of concepts discussed and recommendations provided today.       Education Materials Provided:  Carbohydrate Counting, Fast Food Guide and My Food Plan, Carb Counting Booklet, food logs      ASSESSMENT:  Suzy did not bring her meter today. Suzy has had diabetes since she was 42 years old. Father had a history of diabetes.  She is here to get back on track. Mother passed away last year and Suzy stopped testing, exercising and following food plan.    Patient's most recent   Lab Results   Component Value Date    A1C 8.7 01/30/2020    is not meeting goal of <8.0    PLAN  See Patient Instructions for co-developed, patient-stated behavior change goals.  Keep food logs    Check BG every AM before food and 2 hours after evening meal    I will ask Dr. Mena about changing from Victoza to Ozempic    Return to Diabetes Ed on 3/23/2020 at 9:00 am    Call with any questions or concerns  AVS printed and provided to patient today. See Follow-Up section for recommended follow-up.      Time Spent: 45 minutes  Encounter Type: Individual    Any diabetes medication dose changes were made via the CDE Protocol and Collaborative Practice Agreement with the patient's primary care provider. A copy of this encounter was shared with the provider.

## 2020-02-24 NOTE — TELEPHONE ENCOUNTER
Met with Suzy today. Discussed switching from Victoza to Ozempic. She is open to this. Do you agree?    Please see prescription set up in this encounter for Ozempic.

## 2020-02-25 NOTE — TELEPHONE ENCOUNTER
Lasix  Last Written Prescription Date: 11/29/18  Last Fill Quantity: 180 # of Refills: 1  Last Office Visit: 1/30/20

## 2020-02-26 RX ORDER — FUROSEMIDE 40 MG
TABLET ORAL
Qty: 180 TABLET | Refills: 0 | Status: SHIPPED | OUTPATIENT
Start: 2020-02-26 | End: 2020-05-19

## 2020-02-28 DIAGNOSIS — Z79.4 TYPE 2 DIABETES MELLITUS WITH HYPERGLYCEMIA, WITH LONG-TERM CURRENT USE OF INSULIN (H): ICD-10-CM

## 2020-02-28 DIAGNOSIS — E11.65 TYPE 2 DIABETES MELLITUS WITH HYPERGLYCEMIA, WITH LONG-TERM CURRENT USE OF INSULIN (H): ICD-10-CM

## 2020-02-28 NOTE — TELEPHONE ENCOUNTER
ozempic      Last Written Prescription Date:  Not ordered prior  Last Office Visit: 1/30/20  Future Office visit:    Next 5 appointments (look out 90 days)    May 15, 2020  8:00 AM CDT  (Arrive by 7:45 AM)  PHYSICAL with Maryjane Lyon MD  [facility] [place] ([facility] [place] ) [address]  [place] [address]  [phone]           Routing refill request to provider for review/approval because:

## 2020-03-02 DIAGNOSIS — E11.9 TYPE 2 DIABETES MELLITUS WITHOUT COMPLICATION, WITH LONG-TERM CURRENT USE OF INSULIN (H): ICD-10-CM

## 2020-03-02 DIAGNOSIS — Z79.4 TYPE 2 DIABETES MELLITUS WITHOUT COMPLICATION, WITH LONG-TERM CURRENT USE OF INSULIN (H): ICD-10-CM

## 2020-03-02 NOTE — TELEPHONE ENCOUNTER
SCRIPT CLARIFICATION REQUESTED BY PHARMACY  PATIENT STATES TESTING TID WANTS QUANTITY 300    CONTOUR TEST STRIPS      Last Written Prescription Date:  2-  Last Fill Quantity: 100,   # refills: 11  Last Office Visit: 1-  Future Office visit:    Next 5 appointments (look out 90 days)    May 15, 2020  8:00 AM CDT  (Arrive by 7:45 AM)  PHYSICAL with Maryjane Lyon MD  [facility] [place] ([facility] ) [address]  [place] [address]  [phone]           Routing refill request to provider for review/approval because:  Drug not on the [facility], P or  Health refill protocol or controlled substance

## 2020-03-20 DIAGNOSIS — F34.1 DYSTHYMIA: ICD-10-CM

## 2020-03-23 RX ORDER — VENLAFAXINE HYDROCHLORIDE 150 MG/1
CAPSULE, EXTENDED RELEASE ORAL
Qty: 90 CAPSULE | Refills: 1 | Status: SHIPPED | OUTPATIENT
Start: 2020-03-23 | End: 2020-09-13

## 2020-03-23 NOTE — TELEPHONE ENCOUNTER
Effexor       Last Written Prescription Date:  1/10/2020  Last Fill Quantity: 180,   # refills: 0  Last Office Visit: 1/30/2020  Future Office visit:    Next 5 appointments (look out 90 days)    Apr 27, 2020  8:15 AM CDT  (Arrive by 8:00 AM)  SHORT with Nathanael Mena MD  [facility] ([facility] ) [address] [place] [address]  [phone]   May 15, 2020  8:00 AM CDT  (Arrive by 7:45 AM)  PHYSICAL with Maryjane Lyon MD  [facility] ([facility] ) [address]  [place] [address]  [phone]

## 2020-04-06 DIAGNOSIS — E78.2 MIXED HYPERLIPIDEMIA: ICD-10-CM

## 2020-04-06 RX ORDER — SIMVASTATIN 40 MG
40 TABLET ORAL DAILY
Qty: 90 TABLET | Refills: 0 | Status: SHIPPED | OUTPATIENT
Start: 2020-04-06 | End: 2020-07-01

## 2020-04-06 NOTE — TELEPHONE ENCOUNTER
Simvastatin      Last Written Prescription Date:  10/8/19  Last Fill Quantity: 90,   # refills: 1  Last Office Visit: 1/30/19  Future Office visit:    Next 5 appointments (look out 90 days)    Apr 07, 2020  8:15 AM CDT  Telephone Visit with [facility]el [facility]  [facility] [address]  [facility] [address]  [phone]   May 15, 2020  8:00 AM CDT  (Arrive by 7:45 AM)  PHYSICAL with Maryjane Lyon MD  [facility] ([facility] ) [address]  [place] [address]  [phone]

## 2020-04-07 ENCOUNTER — VIRTUAL VISIT (OUTPATIENT)
Dept: FAMILY MEDICINE | Facility: OTHER | Age: 60
End: 2020-04-07
Attending: FAMILY MEDICINE
Payer: COMMERCIAL

## 2020-04-07 DIAGNOSIS — Z79.4 TYPE 2 DIABETES MELLITUS WITHOUT COMPLICATION, WITH LONG-TERM CURRENT USE OF INSULIN (H): Primary | ICD-10-CM

## 2020-04-07 DIAGNOSIS — E11.9 TYPE 2 DIABETES MELLITUS WITHOUT COMPLICATION, WITH LONG-TERM CURRENT USE OF INSULIN (H): Primary | ICD-10-CM

## 2020-04-07 PROCEDURE — 99212 OFFICE O/P EST SF 10 MIN: CPT | Mod: TEL | Performed by: FAMILY MEDICINE

## 2020-04-07 NOTE — PROGRESS NOTES
"Subjective     Suzy Gallardo is a 59 year old female who is being evaluated via a billable telephone visit.      The patient has been notified of following:     \"This telephone visit will be conducted via a call between you and your physician/provider. We have found that certain health care needs can be provided without the need for a physical exam.  This service lets us provide the care you need with a short phone conversation.  If a prescription is necessary we can send it directly to your pharmacy.  If lab work is needed we can place an order for that and you can then stop by our lab to have the test done at a later time.    If during the course of the call the physician/provider feels a telephone visit is not appropriate, you will not be charged for this service.\"     Patient has given verbal consent for Telephone visit?  Yes    Suzy Gallardo complains of   Chief Complaint   Patient presents with     Diabetes       ALLERGIES  Patient has no known allergies.                   Reviewed and updated as needed this visit by Provider         Review of Systems          Objective   Reported vitals:  There were no vitals taken for this visit.     Psych: Alert and oriented times 3; coherent speech, normal   rate and volume, able to articulate logical thoughts, able   to abstract reason, no tangential thoughts, no hallucinations   or delusions  Her affect is appropriate.              Assessment/Plan:  1. Type 2 diabetes mellitus without complication, with long-term current use of insulin (H)  Doing great.  Sugars 80 in the am.  Occasional 150's.  Doing great.  Explained we will start doing labs again when safe.  Stressed social distancing.        No follow-ups on file.      Phone call duration:  5 minutes    Nathanael Mena MD    "

## 2020-04-20 ENCOUNTER — TELEPHONE (OUTPATIENT)
Dept: EDUCATION SERVICES | Facility: HOSPITAL | Age: 60
End: 2020-04-20

## 2020-04-20 NOTE — TELEPHONE ENCOUNTER
----- Message from Rayna Gilman RN sent at 4/20/2020  2:01 PM CDT -----  Regarding: Change appointment to telephone visit  Please call Suzy to change our visit  on Wed, 4/22, to a telephone visit if she is open to that.    Thanks!

## 2020-04-22 ENCOUNTER — HOSPITAL ENCOUNTER (OUTPATIENT)
Dept: EDUCATION SERVICES | Facility: HOSPITAL | Age: 60
Discharge: HOME | End: 2020-04-22
Attending: NURSE PRACTITIONER | Admitting: FAMILY MEDICINE
Payer: COMMERCIAL

## 2020-04-22 DIAGNOSIS — E11.9 TYPE 2 DIABETES MELLITUS WITHOUT COMPLICATION, WITH LONG-TERM CURRENT USE OF INSULIN (H): ICD-10-CM

## 2020-04-22 DIAGNOSIS — Z79.4 TYPE 2 DIABETES MELLITUS WITHOUT COMPLICATION, WITH LONG-TERM CURRENT USE OF INSULIN (H): ICD-10-CM

## 2020-04-22 PROCEDURE — 40001009 ZZH VIDEO/TELEPHONE VISIT; NO CHARGE

## 2020-04-22 ASSESSMENT — PAIN SCALES - GENERAL: PAINLEVEL: NO PAIN (0)

## 2020-04-22 NOTE — PROGRESS NOTES
"Diabetes Self-Management Education & Support    Presents for: Follow-up(telephone visit)    SUBJECTIVE/OBJECTIVE:  Presents for: Follow-up(telephone visit)  Accompanied by: Self  Diabetes education in the past 24mo: No  Diabetes type: Type 2  Disease course: Improving  Diabetes management related comments/concerns: Would like to test more often  Transportation concerns: No  Difficulty affording diabetes medication?: No  Difficulty affording diabetes testing supplies?: No  Other concerns:: None  Cultural Influences/Ethnic Background:  American      Diabetes Symptoms & Complications:  Fatigue: No  Neuropathy: No  Polydipsia: No  Polyphagia: No  Polyuria: No  Visual change: No  Slow healing wounds: No  Symptom course: Stable  Weight trend: Fluctuating(Fluctuates with fluid retention)  Autonomic neuropathy: No  CVA: No  Heart disease: Other(abnormal stress test)  Nephropathy: No  Peripheral neuropathy: No  Peripheral Vascular Disease: No  Retinopathy: No    Patient Problem List and Family Medical History reviewed for relevant medical history, current medical status, and diabetes risk factors.    Vitals:  There were no vitals taken for this visit.  Estimated body mass index is 35.05 kg/m  as calculated from the following:    Height as of 2/24/20: 1.568 m (5' 1.75\").    Weight as of 2/24/20: 86.2 kg (190 lb 1.6 oz).   Last 3 BP:   BP Readings from Last 3 Encounters:   02/24/20 125/67   02/20/20 148/69   01/30/20 120/70       History   Smoking Status     Never Smoker   Smokeless Tobacco     Never Used       Labs:  Lab Results   Component Value Date    A1C 8.7 01/30/2020     Lab Results   Component Value Date     01/30/2020     Lab Results   Component Value Date    LDL 76 05/07/2019     HDL Cholesterol   Date Value Ref Range Status   05/07/2019 31 (L) >49 mg/dL Final   ]  GFR Estimate   Date Value Ref Range Status   01/30/2020 >90 >60 mL/min/[1.73_m2] Final     Comment:     Non  GFR Calc  Starting " 12/18/2018, serum creatinine based estimated GFR (eGFR) will be   calculated using the Chronic Kidney Disease Epidemiology Collaboration   (CKD-EPI) equation.       GFR Estimate If Black   Date Value Ref Range Status   01/30/2020 >90 >60 mL/min/[1.73_m2] Final     Comment:      GFR Calc  Starting 12/18/2018, serum creatinine based estimated GFR (eGFR) will be   calculated using the Chronic Kidney Disease Epidemiology Collaboration   (CKD-EPI) equation.       Lab Results   Component Value Date    CR 0.58 01/30/2020     No results found for: MICROALBUMIN    Healthy Eating:  Healthy Eating Assessed Today: Yes  Cultural/Church diet restrictions?: No  Meal planning/habits: Avoiding sweets, Carb counting, Keeps food records  Meals include: Breakfast, Lunch, Dinner  Breakfast: Oatmeal  Dinner: Early evening meal: meat, vegetables, salads, occasional corn, potatoes  Snacks: fruit  Beverages: Tea, Water, Other, Diet soda(iced tea, flavored water)  Has patient met with a dietitian in the past?: Yes    Being Active:  Being Active Assessed Today: Yes  Exercise:: Yes  Days per week of moderate to strenuous exercise (like a brisk walk): 7  On average, minutes per day of exercise at this level: 50  How intense was your typical exercise? : Moderate (like brisk walking)  Exercise Minutes per Week: 350  Barrier to exercise: None    Monitoring:  Monitoring Assessed Today: Yes  Blood Glucose Meter: ContourNext  Times checking blood sugar at home (number): 2  Times checking blood sugar at home (per): Day  Blood glucose trend: Decreasing        Taking Medications:  Diabetes Medication(s)     Biguanides       metFORMIN (GLUCOPHAGE) 1000 MG tablet    TAKE 1 TABLET BY MOUTH TWICE DAILY WITH MEALS.    Insulin       insulin glargine (BASAGLAR KWIKPEN) 100 UNIT/ML pen    Inject 53 Units Subcutaneous daily    Incretin Mimetic Agents (GLP-1 Receptor Agonists)       Semaglutide,0.25 or 0.5MG/DOS, (OZEMPIC, 0.25 OR 0.5 MG/DOSE,)  2 MG/1.5ML SOPN    Inject 0.25 mg weekly for 4 weeks then increase to 0.5 mg weekly          Taking Medication Assessed Today: Yes  Current Treatments: Insulin Injections, Non-insulin Injectables, Oral Medication (taken by mouth)  Dose schedule: At bedtime  Given by: Patient  Injection/Infusion sites: Abdomen  Problems taking diabetes medications regularly?: No  Diabetes medication side effects?: No    Problem Solving:  Problem Solving Assessed Today: Yes  Does patient have severe weather/disaster plan for diabetes management?: No  Does patient have sick day plan for diabetes management?: No              Reducing Risks:  Reducing Risks Assessed Today: Yes  CAD Risks: Diabetes Mellitus, Family history, Obesity, Post-menopausal, Sedentary lifestyle  Has dilated eye exam at least once a year?: Yes  Feet checked by healthcare provider in the last year?: Yes    Healthy Coping:  Healthy Coping Assessed Today: Yes  Emotional response to diabetes: Confidence diabetes can be controlled, Concern for health and well-being  Informal Support system:: Family, Spouse  Stage of change: ACTION (Actively working towards change)  Support resources: Websites([employer])  Patient Activation Measure Survey Score:  NOHELIA Score (Last Two) 10/1/2019   NOHELIA Raw Score 33   Activation Score 65.8   NOHELIA Level 3       Diabetes knowledge and skills assessment:   Patient is knowledgeable in diabetes management concepts related to: Healthy Eating, Being Active, Monitoring and Taking Medication    Patient needs further education on the following diabetes management concepts: Monitoring and Taking Medication    Based on learning assessment above, most appropriate setting for further diabetes education would be: telephone visit setting.      INTERVENTIONS:    Education provided today on:  AADE Self-Care Behaviors:  Healthy Eating: consistency in amount, composition, and timing of food intake and portion control  Being Active: relationship to blood  glucose and describe appropriate activity program  Monitoring: log and interpret results, individual blood glucose targets and frequency of monitoring  Taking Medication: action of prescribed medication, drawing up, administering and storing injectable diabetes medications, proper site selection and rotation for injections, side effects of prescribed medications and when to take medications    Opportunities for ongoing education and support in diabetes-self management were discussed.    Pt verbalized understanding of concepts discussed and recommendations provided today.       Education Materials Provided:  No new materials provided today      ASSESSMENT:  See most recent BG readings in Erin Link LPN's rooming note. BG readings trended down. Suzy is consistently working on food choices/portion sizes, as well as, walking daily with her .    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    Expect Suzy to continue to do well.    Patient's most recent   Lab Results   Component Value Date    A1C 8.7 01/30/2020    is not meeting goal of <8.0    PLAN  See Patient Instructions for co-developed, patient-stated behavior change goals.  Continue current plan  Will follow after next A1C  AVS printed and provided to patient today. See Follow-Up section for recommended follow-up.      Time Spent: 20 minutes  Encounter Type: Individual    Any diabetes medication dose changes were made via the CDE Protocol and Collaborative Practice Agreement with the patient's primary care provider. A copy of this encounter was shared with the provider.

## 2020-04-22 NOTE — PROGRESS NOTES
"The patient has been notified of the following:      \"We have found that certain health care needs can be provided without the need for a face to face visit.  This service lets us provide the care you need with a phone conversation.       I will have full access to your [facility] medical record during this entire phone call.   I will be taking notes for your medical record.      Since this is like an office visit, we will bill your insurance company for this service.       There are potential benefits and risks of telephone visits (e.g. limits to patient confidentiality) that differ from in-person visits.?  Confidentiality still applies for telephone services, and nobody will record the visit.  It is important to be in a quiet, private space that is free of distractions (including cell phone or other devices) during the visit.??      If during the course of the call I believe a telephone visit is not appropriate, you will not be charged for this service\"     Consent has been obtained for this service by care team member: Yes        The pt reports her BG reading's:  on 04/22/20 BG was 94 in the am  on 04/21/20 BG was  131 in the am  on 04/19/20 BG was 176 after dinner  on 04/14/20 BG was  150 in the am  on 04/13/20 BG was 124 in the am,  on 04/11/20 BG was 118 in the am  on 04/10/20 BG was 129 in the am  on 04/09/20 BG was 91 in the am  on 04/08/20 BG was  137 in the am, 108 at dinner time    "

## 2020-05-13 DIAGNOSIS — Z79.4 TYPE 2 DIABETES MELLITUS WITHOUT COMPLICATION, WITH LONG-TERM CURRENT USE OF INSULIN (H): ICD-10-CM

## 2020-05-13 DIAGNOSIS — E11.9 TYPE 2 DIABETES MELLITUS WITHOUT COMPLICATION, WITH LONG-TERM CURRENT USE OF INSULIN (H): ICD-10-CM

## 2020-05-13 NOTE — TELEPHONE ENCOUNTER
Failed protocol due to A1C  Hemoglobin A1C   Date Value Ref Range Status   01/30/2020 8.7 (H) 0 - 5.6 % Final     Comment:     Normal <5.7% Prediabetes 5.7-6.4%  Diabetes 6.5% or higher - adopted from ADA   consensus guidelines.

## 2020-05-13 NOTE — TELEPHONE ENCOUNTER
Basaglar      Last Written Prescription Date:  1/17/2020  Last Fill Quantity: 16ml,   # refills: 0  Last Office Visit: 4/7/2020

## 2020-05-14 RX ORDER — INSULIN GLARGINE 100 [IU]/ML
INJECTION, SOLUTION SUBCUTANEOUS
Qty: 15 ML | Refills: 3 | Status: SHIPPED | OUTPATIENT
Start: 2020-05-14 | End: 2020-08-05

## 2020-06-17 DIAGNOSIS — Z79.4 TYPE 2 DIABETES MELLITUS WITHOUT COMPLICATION, WITH LONG-TERM CURRENT USE OF INSULIN (H): Primary | ICD-10-CM

## 2020-06-17 DIAGNOSIS — E11.9 TYPE 2 DIABETES MELLITUS WITHOUT COMPLICATION, WITH LONG-TERM CURRENT USE OF INSULIN (H): Primary | ICD-10-CM

## 2020-06-19 DIAGNOSIS — E11.9 TYPE 2 DIABETES MELLITUS WITHOUT COMPLICATION, WITH LONG-TERM CURRENT USE OF INSULIN (H): ICD-10-CM

## 2020-06-19 DIAGNOSIS — Z79.4 TYPE 2 DIABETES MELLITUS WITHOUT COMPLICATION, WITH LONG-TERM CURRENT USE OF INSULIN (H): ICD-10-CM

## 2020-06-19 LAB
ALBUMIN SERPL-MCNC: 3.8 G/DL (ref 3.4–5)
ALP SERPL-CCNC: 92 U/L (ref 40–150)
ALT SERPL W P-5'-P-CCNC: 38 U/L (ref 0–50)
ANION GAP SERPL CALCULATED.3IONS-SCNC: 6 MMOL/L (ref 3–14)
AST SERPL W P-5'-P-CCNC: 34 U/L (ref 0–45)
BILIRUB SERPL-MCNC: 0.8 MG/DL (ref 0.2–1.3)
BUN SERPL-MCNC: 11 MG/DL (ref 7–30)
CALCIUM SERPL-MCNC: 8.8 MG/DL (ref 8.5–10.1)
CHLORIDE SERPL-SCNC: 109 MMOL/L (ref 94–109)
CHOLEST SERPL-MCNC: 143 MG/DL
CO2 SERPL-SCNC: 26 MMOL/L (ref 20–32)
CREAT SERPL-MCNC: 0.7 MG/DL (ref 0.52–1.04)
EST. AVERAGE GLUCOSE BLD GHB EST-MCNC: 143 MG/DL
GFR SERPL CREATININE-BSD FRML MDRD: >90 ML/MIN/{1.73_M2}
GLUCOSE SERPL-MCNC: 97 MG/DL (ref 70–99)
HBA1C MFR BLD: 6.6 % (ref 0–5.6)
HDLC SERPL-MCNC: 33 MG/DL
LDLC SERPL CALC-MCNC: 73 MG/DL
NONHDLC SERPL-MCNC: 110 MG/DL
POTASSIUM SERPL-SCNC: 3.6 MMOL/L (ref 3.4–5.3)
PROT SERPL-MCNC: 7.3 G/DL (ref 6.8–8.8)
SODIUM SERPL-SCNC: 141 MMOL/L (ref 133–144)
TRIGL SERPL-MCNC: 184 MG/DL

## 2020-06-19 PROCEDURE — 80061 LIPID PANEL: CPT | Performed by: FAMILY MEDICINE

## 2020-06-19 PROCEDURE — 80053 COMPREHEN METABOLIC PANEL: CPT | Performed by: FAMILY MEDICINE

## 2020-06-19 PROCEDURE — 36415 COLL VENOUS BLD VENIPUNCTURE: CPT | Performed by: FAMILY MEDICINE

## 2020-06-19 PROCEDURE — 83036 HEMOGLOBIN GLYCOSYLATED A1C: CPT | Performed by: FAMILY MEDICINE

## 2020-06-24 ENCOUNTER — TELEPHONE (OUTPATIENT)
Dept: FAMILY MEDICINE | Facility: OTHER | Age: 60
End: 2020-06-24

## 2020-06-24 NOTE — TELEPHONE ENCOUNTER
----- Message from Diane Mccall LPN sent at 6/24/2020 11:33 AM CDT -----  Please call and schedule a 3 month follow up diabetic appt in clinic

## 2020-06-27 ENCOUNTER — TRANSFERRED RECORDS (OUTPATIENT)
Dept: HEALTH INFORMATION MANAGEMENT | Facility: CLINIC | Age: 60
End: 2020-06-27

## 2020-06-30 ENCOUNTER — TELEPHONE (OUTPATIENT)
Dept: FAMILY MEDICINE | Facility: OTHER | Age: 60
End: 2020-06-30

## 2020-06-30 ENCOUNTER — MYC MEDICAL ADVICE (OUTPATIENT)
Dept: FAMILY MEDICINE | Facility: OTHER | Age: 60
End: 2020-06-30

## 2020-06-30 DIAGNOSIS — E78.2 MIXED HYPERLIPIDEMIA: ICD-10-CM

## 2020-06-30 DIAGNOSIS — S42.409A CLOSED FRACTURE OF DISTAL END OF HUMERUS, UNSPECIFIED FRACTURE MORPHOLOGY, UNSPECIFIED LATERALITY, INITIAL ENCOUNTER: Primary | ICD-10-CM

## 2020-06-30 RX ORDER — HYDROCODONE BITARTRATE AND ACETAMINOPHEN 5; 325 MG/1; MG/1
1-2 TABLET ORAL
COMMUNITY
Start: 2020-06-27 | End: 2020-06-30

## 2020-06-30 RX ORDER — HYDROCODONE BITARTRATE AND ACETAMINOPHEN 5; 325 MG/1; MG/1
TABLET ORAL
COMMUNITY
Start: 2020-06-27 | End: 2020-07-07

## 2020-06-30 NOTE — TELEPHONE ENCOUNTER
Please see patient message regarding pain medications.pended norco. Please advise.     I advised patient of stitch removal, that they should make a e.d. follow up to get them removed/checked out.    RICH Trujillo

## 2020-06-30 NOTE — TELEPHONE ENCOUNTER
Reason for ER/UC visit: head laceration after a fall    New or Worsening symptoms:     Patient has stitches that need to be removed and she needs to know when to have them taken out.    Prescription Received/Picked up from Pharmacy?: hydrocodone Any questions regarding your prescription?:     Follow-up Results or Labs that are pending:     Do you have any questions or concerns?:     ER Recommends Follow-up By: ER  didn't tell her when to get the stitches out.  She has 3 stitches.    RN Recommendations: will send a message to provider.    Thank you for your time, if you start feeling worse, or have any further questions, please feel free to contact us again at [phone].  If needing immediate medical attention at any time please call 911/Go to the ER.

## 2020-06-30 NOTE — TELEPHONE ENCOUNTER
PCP has no openings this week. Could you schedule her with someone next week Monday or Tuesday?     RICH Trujillo

## 2020-07-01 ENCOUNTER — TRANSFERRED RECORDS (OUTPATIENT)
Dept: HEALTH INFORMATION MANAGEMENT | Facility: CLINIC | Age: 60
End: 2020-07-01

## 2020-07-01 LAB — RETINOPATHY: NORMAL

## 2020-07-01 RX ORDER — SIMVASTATIN 40 MG
TABLET ORAL
Qty: 90 TABLET | Refills: 2 | Status: SHIPPED | OUTPATIENT
Start: 2020-07-01 | End: 2021-03-09

## 2020-07-01 RX ORDER — HYDROCODONE BITARTRATE AND ACETAMINOPHEN 5; 325 MG/1; MG/1
1-2 TABLET ORAL EVERY 6 HOURS PRN
Qty: 18 TABLET | Refills: 0 | Status: SHIPPED | OUTPATIENT
Start: 2020-07-01 | End: 2020-09-24

## 2020-07-01 NOTE — TELEPHONE ENCOUNTER
Zocor       Last Written Prescription Date:  4/06/2020  Last Fill Quantity: 90,   # refills: 0  Last Office Visit: 4/07/2020  Future Office visit:    Next 5 appointments (look out 90 days)    Jul 07, 2020 10:15 AM CDT  (Arrive by 10:00 AM)  SHORT with YUKI Bowen CNP  [facility] ([facility] ) [address]  [place] [address]  [phone]   Sep 24, 2020  9:45 AM CDT  (Arrive by 9:30 AM)  SHORT with Nathanael Mena MD  [facility] ([facility] ) [address] [place] [address]  [phone]

## 2020-07-01 NOTE — TELEPHONE ENCOUNTER
Patients reply to pain medication question in mychart. Please see message. She has a broken arm    RICH Trujillo

## 2020-07-01 NOTE — TELEPHONE ENCOUNTER
Please clarify why the pain pill and what exactly is happening that would require narcotics.  She is reliable and I would fill if needed, but I just want to be crystal clear.  Thanks.   Nathanael Mena MD

## 2020-07-06 DIAGNOSIS — E11.9 TYPE 2 DIABETES MELLITUS WITHOUT COMPLICATION, WITH LONG-TERM CURRENT USE OF INSULIN (H): Primary | ICD-10-CM

## 2020-07-06 DIAGNOSIS — Z79.4 TYPE 2 DIABETES MELLITUS WITHOUT COMPLICATION, WITH LONG-TERM CURRENT USE OF INSULIN (H): Primary | ICD-10-CM

## 2020-07-07 ENCOUNTER — OFFICE VISIT (OUTPATIENT)
Dept: FAMILY MEDICINE | Facility: OTHER | Age: 60
End: 2020-07-07
Attending: NURSE PRACTITIONER
Payer: COMMERCIAL

## 2020-07-07 VITALS
BODY MASS INDEX: 33.68 KG/M2 | HEIGHT: 62 IN | DIASTOLIC BLOOD PRESSURE: 62 MMHG | WEIGHT: 183 LBS | OXYGEN SATURATION: 98 % | TEMPERATURE: 99.2 F | HEART RATE: 70 BPM | SYSTOLIC BLOOD PRESSURE: 130 MMHG

## 2020-07-07 DIAGNOSIS — S01.81XD LACERATION OF OTHER PART OF HEAD WITHOUT FOREIGN BODY, SUBSEQUENT ENCOUNTER: Primary | ICD-10-CM

## 2020-07-07 DIAGNOSIS — S42.291D OTHER CLOSED DISPLACED FRACTURE OF PROXIMAL END OF RIGHT HUMERUS WITH ROUTINE HEALING, SUBSEQUENT ENCOUNTER: ICD-10-CM

## 2020-07-07 PROCEDURE — 99213 OFFICE O/P EST LOW 20 MIN: CPT | Performed by: NURSE PRACTITIONER

## 2020-07-07 RX ORDER — SEMAGLUTIDE 1.34 MG/ML
INJECTION, SOLUTION SUBCUTANEOUS
COMMUNITY
Start: 2020-06-28 | End: 2021-03-10

## 2020-07-07 ASSESSMENT — MIFFLIN-ST. JEOR: SCORE: 1364.36

## 2020-07-07 ASSESSMENT — PAIN SCALES - GENERAL: PAINLEVEL: MILD PAIN (3)

## 2020-07-07 NOTE — PATIENT INSTRUCTIONS
Patient Education     Wound Care    You have a break in the skin. This wound may be because of an injury. Or it may be the result of surgery. Closing the wound helps stop bleeding, protects the wound from infection, and speeds healing. The type of closure that is used depends on the size and location of the wound. Choices include stitches (sutures), strips of surgical tape, skin glue, or staples.  Home care  Your healthcare provider may prescribe medicines for pain. Or he or she may suggest an over-the-counter (OTC) pain reliever, such as ibuprofen. If you have chronic kidney disease, talk with your provider before taking any OTC medicines. Also talk with your provider if you've had a stomach ulcer or gastrointestinal bleeding. In certain cases, antibiotics may be prescribed to help prevent infection. If antibiotics are prescribed, take them exactly as directed for as long as directed. Don't stop taking your antibiotics until they are all gone, even if you feel better.  General care    Follow the healthcare provider s instructions on how to care for the wound.    Wash your hands with soap and warm water before and after caring for the wound. This helps prevent infection.    If a bandage was applied, change it once a day or as directed. If at any time the bandage becomes wet or dirty, replace it with a new one.    Unless told otherwise, avoid soaking the wound in water. Take showers or sponge baths instead of tub baths. Don't scrub or pick at the wound.    Don't go swimming.    If you have a bandage and it gets wet, use a clean cloth to gently pat the wound dry. Then replace the bandage with a dry one.    Don't scratch, rub, or pick at the area.    Watch for the signs of infection listed below. Any wound can get infected, even if you are taking antibiotics. Seek care right away if you see any possible signs of infection.  Care for specific closures    Stitches. You may want to clean the wound daily after the first 2  to 3 days. To do this, remove the bandage and gently wash the area with soap and warm water. After cleaning, apply a thin layer of antibiotic ointment if recommended. Then apply a new bandage. Stitches es on the outside of the skin usually need to be removed by your healthcare provider.    Surgical tape. Keep the area dry. If it gets wet, blot it dry with a towel. Surgical tape closures usually fall off within 7 to 10 days. If they have not fallen off after 10 days, you can remove them yourself. To remove the tape, use mineral oil or petroleum jelly on a cotton ball to gently rub the adhesive.    Skin glue. You may shower or bathe as usual, but don't use soaps, lotions, or ointments on the wound area. Don't scrub the wound. After bathing, pat the wound dry with a soft towel. Don't apply liquids like peroxide, ointments, or creams to the wound while the strips or film is in place. Don't scratch, rub, or pick at the strips or film. Don't put tape directly over the strips or film. Skin adhesive film will fall off naturally in 5 to 10 days. If it does not peel off in 10 days, gently rub petroleum jelly or an ointment onto the film.    [place]. Take showers or sponge baths. Don't take tub baths. Don't use lotions on the wound area. The area may be cleaned with soap and water 2 to 3 days after the wound was stapled. Don't scrub the wound. Pat it dry with a clean soft cloth or towel. You can use antibiotic ointment if your provider tells you to. Staples will need to be removed by your healthcare provider in 10 to 14 days.  Follow-up care  Follow up with your healthcare provider, or as directed. If you have stitches or staples, return for their removal as directed.  When to seek medical advice  Call your healthcare provider right away if you have signs of infection:    Fever of 100.4 F (38 C) or higher, or as directed by your healthcare provider    Increasing pain in the wound    Increasing redness or swelling    Pus or  bad-smelling drainage from the wound  Also call your provider right away if any of these occur:    Wound bleeds more than a small amount or won t stop bleeding    Wound edges come apart    Numbness or weakness in the wound area that doesn t go away  Date Last Reviewed: 6/1/2018 2000-2019 [employer]. [address] [place] [address], [place] [address]. All rights reserved. This information is not intended as a substitute for professional medical care. Always follow your healthcare professional's instructions.

## 2020-07-07 NOTE — NURSING NOTE
"Chief Complaint   Patient presents with     Hospital F/U     suture removal # 3 to forehead        Initial /62 (BP Location: Left arm, Patient Position: Chair, Cuff Size: Adult Regular)   Pulse 70   Temp 99.2  F (37.3  C) (Tympanic)   Ht 1.568 m (5' 1.75\")   Wt 83 kg (183 lb)   SpO2 98%   BMI 33.74 kg/m   Estimated body mass index is 33.74 kg/m  as calculated from the following:    Height as of this encounter: 1.568 m (5' 1.75\").    Weight as of this encounter: 83 kg (183 lb).  Medication Reconciliation: complete  Rima Sumner LPN  "

## 2020-07-07 NOTE — PROGRESS NOTES
Subjective     Suzy Gallardo is a 59 year old female who presents to clinic today for the following health issues:    HPI   ED/UC Followup:    Facility:  [facility]   Date of visit: 2020 - fall   Unknown loss of consciousness, fall down a hill.   Reason for visit: Displaced FX of proximal end (R) humerus , # 3 sutures to forehead   Current Status: denies headaches, right arm remains in sling, suture to right forehead (no signs of infection)  Prescribed Norco for pain medication at discharge  Ortho referral  Has an appointment with ortho at [facility]     Diagnosis from ER Visit  Other closed displaced fracture of proximal end of right humerus, initial encounter     Fall, initial encounter     Laceration of other part of head without foreign body, initial encounter            Labs and imaging copied from ER visit   Procedure Note - [facility] Incoming Radiology Results - 2020 6:06 PM CDT    This document is currently in Preliminary Status    Exam Accession# [id number]    XR SHOULDER RIGHT 2 OR MORE VIEWS VRHRAD  REFERRING PROVIDER: JAZMIN ZHANG  REPORT DATE: 2020 6:00 PM  PATIENT : 1960, 59 years  HISTORY: Fall;     FINDINGS: Mild acromioclavicular joint degenerative change. Comminuted fracture of the proximal humerus with greater tuberosity avulsion about 4 mm. There is a transverse component extending into the glenohumeral articulation with mild posterior angulation of the distal fragment. The scapula looks intact. The clavicle is unremarkable. No rib fractures.    IMPRESSION: Comminuted fracture of the proximal humerus.    Dictated By: Dr. Michael Kenyon MD 2020 6:00 PM  Edited By: MILANA 2020 6:06 PM       Procedure Note - Interface, [facility] Incoming Radiology Results - 2020 6:06 PM CDT    This document is currently in Preliminary Status  Exam Accession# [id number]  XR ELBOW RIGHT 3 OR MORE VIEWS VRHRAD  REFERRING PROVIDER: JAZMIN ZHANG  REPORT  DATE: 2020 5:59 PM  PATIENT : 1960, 59 years  HISTORY PROVIDED: Fall;     IMPRESSION: Small radiopaque foreign bodies superficially over the distal forearm and elbow likely contamination of the skin. There is no fracture or dislocation in the elbow.    Dictated By: Dr. Michael Kenyon MD 2020 5:59 PM  Edited By: CL 2020 6:05 PM      CT HEAD WO IV CONTRAST  COMPARISON: None.  HISTORY: Fall, hit head;   TECHNIQUE:   CT images of the brain are performed without contrast.    FINDINGS:  CSF spaces: Normal for age.    Parenchyma: No mass lesion, midline shift, or mass effect. No intraaxial or extraaxial hemorrhage.    Orbits: Globes, extraocular muscles, optic nerve and lacrimal glands are unremarkable.    Auditory complex: External and internal auditory canals, middle and inner ears, and mastoid air cells are normal in appearance.    Sinuses: No appreciable disease.     Skull and scalp: Soft tissue injury right forehead with some air consistent with an open wound. No underlying fracture.    IMPRESSION:  Superficial soft tissue injury right forehead. No underlying hemorrhage or fracture.    Dictated By: Dr. Michael Kenyon MD 2020 5:50 PM  Edited By: MILANA 2020 5:52 PM      CT CERVICAL SPINE WO IV CONTRAST    COMPARISON: None      HISTORY: fall;      TECHNICAL:    Axial, sagittal and coronal images of the cervical spine without contrast.        FINDINGS:     Moderate degenerative disc disease identified.  No fracture, subluxation, or paraspinal soft tissue swelling.        IMPRESSION:    Degenerative change. No acute fracture.        Electronically Signed: Dr. Michael Kenyon MD 2020 5:57 PM                Patient Active Problem List   Diagnosis     [place] disease      Cellulitis     Annual physical exam     Morbid obesity (H)     Disorder of lipoid metabolism     Status post hysterectomy     ACP (advance care planning)     Type 2 diabetes mellitus without complication, with long-term  current use of insulin (H)     Depression     Mixed hyperlipidemia     Elevated liver function tests     Abnormal stress test     Chest discomfort     Benign essential hypertension     Hepatic steatosis     Anxiety     Past Surgical History:   Procedure Laterality Date     BREAST SURGERY      breast biopsy     C TOTAL ABDOM HYSTERECTOMY  07/01/2009    Dr Martinez, no cervix     COLONOSCOPY  5/3/2013    Procedure: COLONOSCOPY;  COLONOSCOPY;  Surgeon: Pablito Urbina MD;  Location: [facility]     ENDOMETRIAL SAMPLING (BIOPSY)       LAPAROSCOPY       NOSE SURGERY      birth edilberto removed     wisdom teeth removed         Social History     Tobacco Use     Smoking status: Never Smoker     Smokeless tobacco: Never Used   Substance Use Topics     Alcohol use: No     Family History   Problem Relation Age of Onset     Stomach Cancer Brother      C.A.D. Father 40        s/p quad bypass     Diabetes Type 2  Brother      Hypertension Brother      Hypertension Other      Chronic Obstructive Pulmonary Disease Mother      Lung Cancer Mother      Bladder Cancer Mother          Current Outpatient Medications   Medication Sig Dispense Refill     ASPIRIN PO Take 81 mg by mouth daily        blood glucose (CONTOUR NEXT TEST) test strip Use to test blood sugar 4 times daily. 200 each 11     Blood Glucose Monitoring Suppl (ONE TOUCH ULTRA) 1 each strip TID AC       furosemide (LASIX) 40 MG tablet TAKE 1 TABLET BY MOUTH 2 TIMES DAILY 180 tablet 0     HYDROcodone-acetaminophen (NORCO) 5-325 MG tablet Take 1-2 tablets by mouth every 6 hours as needed for severe pain 18 tablet 0     IBUPROFEN PO        insulin glargine (BASAGLAR KWIKPEN) 100 UNIT/ML pen INJECT 53 UNITS UNDER THE SKIN DAILY. 15 mL 3     Insulin Pen Needle (BD U/F III SHORT PEN NEEDLE) See instructions.       metFORMIN (GLUCOPHAGE) 1000 MG tablet TAKE 1 TABLET BY MOUTH TWICE DAILY WITH MEALS. 180 tablet 3     simvastatin (ZOCOR) 40 MG tablet TAKE 1 TABLET BY MOUTH EVERY DAY. *DUE FOR  "ANNUAL LABS 90 tablet 2     venlafaxine (EFFEXOR-XR) 150 MG 24 hr capsule TAKE 1 CAPSULE BY MOUTH EVERY DAY 90 capsule 1     calcium-vitamin D (CALTRATE) 600-400 MG-UNIT per tablet Take 1 tablet by mouth daily.       OZEMPIC, 0.25 OR 0.5 MG/DOSE, 2 MG/1.5ML SOPN INJECT 0.25 MG WEEKLY FOR 4 WEEKS THEN INCREASE TO 0.5 MG WEEKLY       No Known Allergies  BP Readings from Last 3 Encounters:   07/07/20 130/62   02/24/20 125/67   02/20/20 148/69    Wt Readings from Last 3 Encounters:   07/07/20 83 kg (183 lb)   02/24/20 86.2 kg (190 lb 1.6 oz)   02/20/20 83.9 kg (185 lb)                      Reviewed and updated as needed this visit by Provider         Review of Systems   CONSTITUTIONAL: NEGATIVE for fever, chills, change in weight  INTEGUMENTARY/SKIN: suture intact to forehead, abrasion to right arm   EYES: NEGATIVE for vision changes or irritation  RESP: NEGATIVE for significant cough or SOB  CV: NEGATIVE for chest pain, palpitations or peripheral edema  GI: NEGATIVE for nausea, abdominal pain, heartburn, or change in bowel habits  : denies dysuria   MUSCULOSKELETAL: right arm and shoulder pain   NEURO: NEGATIVE for weakness, dizziness or paresthesias      Objective    /62 (BP Location: Left arm, Patient Position: Chair, Cuff Size: Adult Regular)   Pulse 70   Temp 99.2  F (37.3  C) (Tympanic)   Ht 1.568 m (5' 1.75\")   Wt 83 kg (183 lb)   SpO2 98%   BMI 33.74 kg/m    Body mass index is 33.74 kg/m .  Physical Exam   GENERAL: alert and no distress  RESP: lungs clear to auscultation - no rales, rhonchi or wheezes  CV: regular rate and rhythm, normal S1 S2, no S3 or S4, no murmur, click or rub, no peripheral edema and peripheral pulses strong  MS: decreased ROM right shoulder and arm/sling on  SKIN: sutures removed light bruising for forehead, swelling right hand   NEURO: weakness of right arm and shoulder, sensory exam grossly normal, mentation intact and cranial nerves 2-12 intact  PSYCH: mentation appears " "normal, affect normal/bright    Diagnostic Test Results:  Labs reviewed in Epic        Assessment & Plan     1. Laceration of other part of head without foreign body, subsequent encounter  3 sutures removed without complications  Discussed continue to monitor for signs of infection  Follow up if any concerns or signs of infections    2. Other closed displaced fracture of proximal end of right humerus with routine healing, subsequent encounter  Continue to follow up with orthopedics at [facility]       BMI:   Estimated body mass index is 33.74 kg/m  as calculated from the following:    Height as of this encounter: 1.568 m (5' 1.75\").    Weight as of this encounter: 83 kg (183 lb).           See Patient Instructions    No follow-ups on file.    YUKI Gutierrez [facility] [place]      "

## 2020-07-08 ENCOUNTER — MYC MEDICAL ADVICE (OUTPATIENT)
Dept: FAMILY MEDICINE | Facility: OTHER | Age: 60
End: 2020-07-08

## 2020-07-08 DIAGNOSIS — Z79.4 TYPE 2 DIABETES MELLITUS WITHOUT COMPLICATION, WITH LONG-TERM CURRENT USE OF INSULIN (H): Primary | ICD-10-CM

## 2020-07-08 DIAGNOSIS — E11.9 TYPE 2 DIABETES MELLITUS WITHOUT COMPLICATION, WITH LONG-TERM CURRENT USE OF INSULIN (H): Primary | ICD-10-CM

## 2020-07-09 RX ORDER — PEN NEEDLE, DIABETIC 31 GX5/16"
NEEDLE, DISPOSABLE MISCELLANEOUS
Qty: 100 EACH | Refills: 3 | Status: SHIPPED | OUTPATIENT
Start: 2020-07-09 | End: 2020-07-16

## 2020-07-09 RX ORDER — PEN NEEDLE, DIABETIC 31 GX5/16"
NEEDLE, DISPOSABLE MISCELLANEOUS
Qty: 200 EACH | Refills: 0 | OUTPATIENT
Start: 2020-07-09

## 2020-07-16 DIAGNOSIS — E11.9 TYPE 2 DIABETES MELLITUS WITHOUT COMPLICATION, WITH LONG-TERM CURRENT USE OF INSULIN (H): ICD-10-CM

## 2020-07-16 DIAGNOSIS — Z79.4 TYPE 2 DIABETES MELLITUS WITHOUT COMPLICATION, WITH LONG-TERM CURRENT USE OF INSULIN (H): ICD-10-CM

## 2020-07-16 RX ORDER — PEN NEEDLE, DIABETIC 31 GX5/16"
NEEDLE, DISPOSABLE MISCELLANEOUS
Qty: 100 EACH | Refills: 3 | Status: SHIPPED | OUTPATIENT
Start: 2020-07-16 | End: 2021-09-01

## 2020-07-16 NOTE — TELEPHONE ENCOUNTER
insulin pen needle (B-D U/F) 31G X 8 MM miscellaneous     Last Written Prescription Date:  7/9/20  Last Fill Quantity: 100,   # refills: 3  Last Office Visit: 7/7/20 [facility]   Future Office visit:    Next 5 appointments (look out 90 days)    Sep 24, 2020  9:45 AM CDT  (Arrive by 9:30 AM)  SHORT with Nathanael Mena MD  [facility] [address] [place] [address] [facility] [address]  [phone]           Routing refill request to provider for review/approval because:      Please review order:    Added to order per patient for Insurance to cover:     Once daily when taking Basaglar.

## 2020-08-05 DIAGNOSIS — E11.9 TYPE 2 DIABETES MELLITUS WITHOUT COMPLICATION, WITH LONG-TERM CURRENT USE OF INSULIN (H): ICD-10-CM

## 2020-08-05 DIAGNOSIS — Z79.4 TYPE 2 DIABETES MELLITUS WITHOUT COMPLICATION, WITH LONG-TERM CURRENT USE OF INSULIN (H): ICD-10-CM

## 2020-08-05 RX ORDER — INSULIN GLARGINE 100 [IU]/ML
INJECTION, SOLUTION SUBCUTANEOUS
Qty: 45 ML | Refills: 1 | Status: SHIPPED | OUTPATIENT
Start: 2020-08-05 | End: 2021-01-19

## 2020-08-12 ENCOUNTER — TRANSFERRED RECORDS (OUTPATIENT)
Dept: HEALTH INFORMATION MANAGEMENT | Facility: CLINIC | Age: 60
End: 2020-08-12

## 2020-08-12 LAB — RETINOPATHY: NEGATIVE

## 2020-09-16 NOTE — PROGRESS NOTES
Subjective     Suzy Gallardo is a 60 year old female who presents to clinic today for the following health issues:    HPI       Diabetes Follow-up    How often are you checking your blood sugar? Two times daily  Blood sugar testing frequency justification:  Uncontrolled diabetes  What time of day are you checking your blood sugars (select all that apply)?  Before meals  Have you had any blood sugars above 200?  Yes   Have you had any blood sugars below 70?  Yes a couple    What symptoms do you notice when your blood sugar is low?  Shaky, Dizzy, Weak and Lethargy    What concerns do you have today about your diabetes? None     Do you have any of these symptoms? (Select all that apply)  No numbness or tingling in feet.  No redness, sores or blisters on feet.  No complaints of excessive thirst.  No reports of blurry vision.  No significant changes to weight.    Have you had a diabetic eye exam in the last 12 months? Yes- Date of last eye exam: july 2020,  Location: [facility]        BP Readings from Last 2 Encounters:   07/07/20 130/62   02/24/20 125/67     Hemoglobin A1C (%)   Date Value   06/19/2020 6.6 (H)   01/30/2020 8.7 (H)     LDL Cholesterol Calculated (mg/dL)   Date Value   06/19/2020 73   05/07/2019 76               Hypertension Follow-up      Do you check your blood pressure regularly outside of the clinic? No     Are you following a low salt diet? Yes    Are your blood pressures ever more than 140 on the top number (systolic) OR more   than 90 on the bottom number (diastolic), for example 140/90? No    Diabetic foot exam   1. foot deformity   No  2. Current or previous foot ulceration     No  3. Current or previous pre-ulcerative calluses     Yes  4. previous partial amputation of one or both feet or complete amputation of one foot     No  5. peripheral neuropathy with evidence of callus formation     No  6. poor circulation     No    Ongoing right shoulder pain is miserable s/p fall and fracture in the  "summer.  See below.      Review of Systems   Constitutional, HEENT, cardiovascular, pulmonary, gi and gu systems are negative, except as otherwise noted.      Objective    There were no vitals taken for this visit.  There is no height or weight on file to calculate BMI.  Physical Exam   GENERAL: healthy, alert and no distress  NECK: no adenopathy, no asymmetry, masses, or scars and thyroid normal to palpation  RESP: lungs clear to auscultation - no rales, rhonchi or wheezes  CV: regular rate and rhythm, normal S1 S2, no S3 or S4, no murmur, click or rub, no peripheral edema and peripheral pulses strong  ABDOMEN: soft, nontender, no hepatosplenomegaly, no masses and bowel sounds normal  MS: pain with any abduction of the shoulder    Xray machine malfunctioned, I can see the old fx.  MRI pending.         Assessment & Plan     Type 2 diabetes mellitus without complication, with long-term current use of insulin (H)  Reviewed.  Update and follow.    - Basic metabolic panel  - Hemoglobin A1c  - Albumin Random Urine Quantitative with Creat Ratio  - C FOOT EXAM  NO CHARGE    Need for prophylactic vaccination and inoculation against influenza  Given.   - Vaccine Administration, Initial [96669]    Benign essential hypertension  Reviewed.  Stable.     Right shoulder pain, unspecified chronicity  Discussed at length.  Consider cuff injury with the fall and ongoing sx.  Getting MRI to clarify and help plan.    - MR Shoulder Right w/o Contrast; Future  - XR Shoulder Right 1 View     BMI:   Estimated body mass index is 34.01 kg/m  as calculated from the following:    Height as of 7/7/20: 1.568 m (5' 1.75\").    Weight as of this encounter: 83.7 kg (184 lb 7 oz).               No follow-ups on file.    Nathanael Mena MD  [facility]    "

## 2020-09-24 ENCOUNTER — ANCILLARY PROCEDURE (OUTPATIENT)
Dept: GENERAL RADIOLOGY | Facility: OTHER | Age: 60
End: 2020-09-24
Attending: FAMILY MEDICINE
Payer: COMMERCIAL

## 2020-09-24 ENCOUNTER — OFFICE VISIT (OUTPATIENT)
Dept: FAMILY MEDICINE | Facility: OTHER | Age: 60
End: 2020-09-24
Attending: FAMILY MEDICINE
Payer: COMMERCIAL

## 2020-09-24 VITALS
TEMPERATURE: 98 F | BODY MASS INDEX: 34.01 KG/M2 | DIASTOLIC BLOOD PRESSURE: 68 MMHG | SYSTOLIC BLOOD PRESSURE: 132 MMHG | OXYGEN SATURATION: 97 % | WEIGHT: 184.44 LBS | HEART RATE: 74 BPM

## 2020-09-24 DIAGNOSIS — M25.511 RIGHT SHOULDER PAIN, UNSPECIFIED CHRONICITY: ICD-10-CM

## 2020-09-24 DIAGNOSIS — Z79.4 TYPE 2 DIABETES MELLITUS WITHOUT COMPLICATION, WITH LONG-TERM CURRENT USE OF INSULIN (H): Primary | ICD-10-CM

## 2020-09-24 DIAGNOSIS — Z23 NEED FOR PROPHYLACTIC VACCINATION AND INOCULATION AGAINST INFLUENZA: ICD-10-CM

## 2020-09-24 DIAGNOSIS — I10 BENIGN ESSENTIAL HYPERTENSION: ICD-10-CM

## 2020-09-24 DIAGNOSIS — E11.9 TYPE 2 DIABETES MELLITUS WITHOUT COMPLICATION, WITH LONG-TERM CURRENT USE OF INSULIN (H): Primary | ICD-10-CM

## 2020-09-24 LAB
ANION GAP SERPL CALCULATED.3IONS-SCNC: 8 MMOL/L (ref 3–14)
BUN SERPL-MCNC: 10 MG/DL (ref 7–30)
CALCIUM SERPL-MCNC: 9 MG/DL (ref 8.5–10.1)
CHLORIDE SERPL-SCNC: 105 MMOL/L (ref 94–109)
CO2 SERPL-SCNC: 26 MMOL/L (ref 20–32)
CREAT SERPL-MCNC: 0.58 MG/DL (ref 0.52–1.04)
CREAT UR-MCNC: 87 MG/DL
EST. AVERAGE GLUCOSE BLD GHB EST-MCNC: 137 MG/DL
GFR SERPL CREATININE-BSD FRML MDRD: >90 ML/MIN/{1.73_M2}
GLUCOSE SERPL-MCNC: 115 MG/DL (ref 70–99)
HBA1C MFR BLD: 6.4 % (ref 0–5.6)
MICROALBUMIN UR-MCNC: 8 MG/L
MICROALBUMIN/CREAT UR: 8.9 MG/G CR (ref 0–25)
POTASSIUM SERPL-SCNC: 3.6 MMOL/L (ref 3.4–5.3)
SODIUM SERPL-SCNC: 139 MMOL/L (ref 133–144)

## 2020-09-24 PROCEDURE — 82043 UR ALBUMIN QUANTITATIVE: CPT | Performed by: FAMILY MEDICINE

## 2020-09-24 PROCEDURE — 73020 X-RAY EXAM OF SHOULDER: CPT | Mod: TC

## 2020-09-24 PROCEDURE — 90682 RIV4 VACC RECOMBINANT DNA IM: CPT | Performed by: FAMILY MEDICINE

## 2020-09-24 PROCEDURE — 80048 BASIC METABOLIC PNL TOTAL CA: CPT | Performed by: FAMILY MEDICINE

## 2020-09-24 PROCEDURE — 83036 HEMOGLOBIN GLYCOSYLATED A1C: CPT | Performed by: FAMILY MEDICINE

## 2020-09-24 PROCEDURE — 99214 OFFICE O/P EST MOD 30 MIN: CPT | Mod: 25 | Performed by: FAMILY MEDICINE

## 2020-09-24 PROCEDURE — 36415 COLL VENOUS BLD VENIPUNCTURE: CPT | Performed by: FAMILY MEDICINE

## 2020-09-24 PROCEDURE — 90471 IMMUNIZATION ADMIN: CPT | Performed by: FAMILY MEDICINE

## 2020-09-24 ASSESSMENT — ANXIETY QUESTIONNAIRES
5. BEING SO RESTLESS THAT IT IS HARD TO SIT STILL: NOT AT ALL
4. TROUBLE RELAXING: NOT AT ALL
7. FEELING AFRAID AS IF SOMETHING AWFUL MIGHT HAPPEN: NOT AT ALL
GAD7 TOTAL SCORE: 0
2. NOT BEING ABLE TO STOP OR CONTROL WORRYING: NOT AT ALL
3. WORRYING TOO MUCH ABOUT DIFFERENT THINGS: NOT AT ALL
1. FEELING NERVOUS, ANXIOUS, OR ON EDGE: NOT AT ALL
6. BECOMING EASILY ANNOYED OR IRRITABLE: NOT AT ALL

## 2020-09-24 ASSESSMENT — PATIENT HEALTH QUESTIONNAIRE - PHQ9: SUM OF ALL RESPONSES TO PHQ QUESTIONS 1-9: 2

## 2020-09-24 ASSESSMENT — PAIN SCALES - GENERAL: PAINLEVEL: MODERATE PAIN (4)

## 2020-09-24 NOTE — NURSING NOTE
"Chief Complaint   Patient presents with     Diabetes       Initial /68   Pulse 74   Temp 98  F (36.7  C) (Tympanic)   Wt 83.7 kg (184 lb 7 oz)   SpO2 97%   BMI 34.01 kg/m   Estimated body mass index is 34.01 kg/m  as calculated from the following:    Height as of 7/7/20: 1.568 m (5' 1.75\").    Weight as of this encounter: 83.7 kg (184 lb 7 oz).  Medication Reconciliation: complete  Brenda Watt MA  "

## 2020-09-25 ASSESSMENT — ANXIETY QUESTIONNAIRES: GAD7 TOTAL SCORE: 0

## 2020-09-27 DIAGNOSIS — E11.9 TYPE 2 DIABETES MELLITUS WITHOUT COMPLICATION, WITH LONG-TERM CURRENT USE OF INSULIN (H): ICD-10-CM

## 2020-09-27 DIAGNOSIS — Z79.4 TYPE 2 DIABETES MELLITUS WITHOUT COMPLICATION, WITH LONG-TERM CURRENT USE OF INSULIN (H): ICD-10-CM

## 2020-10-06 ENCOUNTER — HOSPITAL ENCOUNTER (OUTPATIENT)
Dept: MRI IMAGING | Facility: HOSPITAL | Age: 60
Discharge: HOME | End: 2020-10-06
Attending: FAMILY MEDICINE | Admitting: FAMILY MEDICINE
Payer: COMMERCIAL

## 2020-10-06 DIAGNOSIS — M25.511 RIGHT SHOULDER PAIN, UNSPECIFIED CHRONICITY: ICD-10-CM

## 2020-10-06 DIAGNOSIS — M25.511 RIGHT SHOULDER PAIN, UNSPECIFIED CHRONICITY: Primary | ICD-10-CM

## 2020-10-06 DIAGNOSIS — S42.294S OTHER CLOSED NONDISPLACED FRACTURE OF PROXIMAL END OF RIGHT HUMERUS, SEQUELA: ICD-10-CM

## 2020-10-06 PROCEDURE — 73221 MRI JOINT UPR EXTREM W/O DYE: CPT | Mod: RT

## 2020-10-16 DIAGNOSIS — I10 BENIGN ESSENTIAL HYPERTENSION: ICD-10-CM

## 2020-10-16 RX ORDER — FUROSEMIDE 40 MG
TABLET ORAL
Qty: 180 TABLET | Refills: 0 | Status: SHIPPED | OUTPATIENT
Start: 2020-10-16 | End: 2021-01-14

## 2020-10-16 NOTE — TELEPHONE ENCOUNTER
Lasix       Last Written Prescription Date:  5/19/2020  Last Fill Quantity: 180,   # refills: 0  Last Office Visit: 9/24/2020  Future Office visit:

## 2020-11-07 ENCOUNTER — HEALTH MAINTENANCE LETTER (OUTPATIENT)
Age: 60
End: 2020-11-07

## 2020-11-10 ENCOUNTER — TRANSFERRED RECORDS (OUTPATIENT)
Dept: HEALTH INFORMATION MANAGEMENT | Facility: CLINIC | Age: 60
End: 2020-11-10

## 2020-12-01 NOTE — PATIENT INSTRUCTIONS

## 2020-12-02 NOTE — PROGRESS NOTES
[facility] - [place]  [address]  [place] [address]  Phone: [phone]  Primary Provider: Nathanael Mena  Pre-op Performing Provider: TRACEY CARNEY    PREOPERATIVE EVALUATION:  Today's date: 12/4/2020    Suzy Gallardo is a 60 year old female who presents for a preoperative evaluation.    Surgical Information:  Surgery/Procedure: Rt arm/ shoulder  Surgery Location: [facility]  Surgeon: Dr. Watson  Surgery Date: 12/11/20  Time of Surgery: TBD  Where patient plans to recover: At home with family  Fax number for surgical facility: [phone]    Type of Anesthesia Anticipated: to be determined    Subjective     HPI related to upcoming procedure: Patient scheduled to have right shoulder arthroscopy capsule release      Preop Questions 12/3/2020   1. Have you ever had a heart attack or stroke? No   2. Have you ever had surgery on your heart or blood vessels, such as a stent placement, a coronary artery bypass, or surgery on an artery in your head, neck, heart, or legs? No   3. Do you have chest pain with activity? No   4. Do you have a history of  heart failure? No   5. Do you currently have a cold, bronchitis or symptoms of other infection? No   6. Do you have a cough, shortness of breath, or wheezing? No   7. Do you or anyone in your family have previous history of blood clots? No   8. Do you or does anyone in your family have a serious bleeding problem such as prolonged bleeding following surgeries or cuts? No   9. Have you ever had problems with anemia or been told to take iron pills? YES - After hysterectomy 2009   10. Have you had any abnormal blood loss such as black, tarry or bloody stools, or abnormal vaginal bleeding? YES - Before hysterectomy    11. Have you ever had a blood transfusion? YES - 2009   11a. Have you ever had a transfusion reaction? No   12. Are you willing to have a blood transfusion if it is medically needed before, during, or after your surgery? Yes   13.  Have you or any of your relatives ever had problems with anesthesia? YES - mother BP dropped low   14. Do you have sleep apnea, excessive snoring or daytime drowsiness? No   15. Do you have any artifical heart valves or other implanted medical devices like a pacemaker, defibrillator, or continuous glucose monitor? No   16. Do you have artificial joints? No   17. Are you allergic to latex? No   18. Is there any chance that you may be pregnant? No     Health Care Directive:  Patient does not have a Health Care Directive or Living Will: Discussed advance care planning with patient; however, patient declined at this time.    Preoperative Review of :   reviewed - no record of controlled substances prescribed.          Review of Systems  CONSTITUTIONAL: NEGATIVE for fever, chills, change in weight  INTEGUMENTARY/SKIN: NEGATIVE for worrisome rashes, moles or lesions  EYES: NEGATIVE for vision changes or irritation  ENT/MOUTH: NEGATIVE for ear, mouth and throat problems  RESP: NEGATIVE for significant cough or SOB  BREAST: NEGATIVE for masses, tenderness or discharge  CV: NEGATIVE for chest pain, palpitations or peripheral edema  GI: NEGATIVE for nausea, abdominal pain, heartburn, or change in bowel habits  : NEGATIVE for frequency, dysuria, or hematuria  MUSCULOSKELETAL: NEGATIVE for significant arthralgias or myalgia  NEURO: NEGATIVE for weakness, dizziness or paresthesias  ENDOCRINE: NEGATIVE for temperature intolerance, skin/hair changes  HEME: NEGATIVE for bleeding problems  PSYCHIATRIC: NEGATIVE for changes in mood or affect    Patient Active Problem List    Diagnosis Date Noted     Abnormal stress test 12/17/2018     Priority: Medium     Chest discomfort 12/17/2018     Priority: Medium     Benign essential hypertension 12/17/2018     Priority: Medium     Hepatic steatosis 12/17/2018     Priority: Medium     Anxiety 12/17/2018     Priority: Medium     Elevated liver function tests 04/17/2018     Priority:  Medium     ACP (advance care planning) 08/03/2016     Priority: Medium     Advance Care Planning 8/3/2016: ACP Review of Chart / Resources Provided:  Reviewed chart for advance care plan.  Suzy Gallardo has been provided information and resources to begin or update their advance care plan.  Added by Sisi Link               Type 2 diabetes mellitus without complication, with long-term current use of insulin (H) 08/03/2016     Priority: Medium     Depression 04/13/2016     Priority: Medium     Mixed hyperlipidemia 04/13/2016     Priority: Medium     Status post hysterectomy 01/13/2015     Priority: Medium     Annual physical exam 01/07/2014     Priority: Medium     Morbid obesity (H) 01/07/2014     Priority: Medium     Overview:   Initial visit 4/13/16 initial  weight  198  Goal weight 150 BMI of 25     BMR 1460  Low activity  1752 Light activity 2007 Moderate activity 2263  High activity 2518    Calorie goal: 1200 to 1400   Carb goal :50 gm net  Protein goal: 75       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 Provider to review       Cellulitis 10/31/2013     Priority: Medium     [place] disease  03/01/2012     Priority: Medium     Nathanael Mena MD         Past Medical History:   Diagnosis Date     Anemia, unspecified 06/23/2004    Lali Monroe MD      Depressive disorder, not elsewhere classified 01/19/2004     Diabetes mellitus without mention of complication, 11/30/2001     Endometriosis of uterus 12/19/2001     [place] disease 03/01/2012    Nathanael Mena MD      Other (abnormal) findings on radiological examination of breast 08/15/2002    Alfonso Patton MD      Pure hypercholesterolemia 11/26/1999    Cristian Bradley MD      Sterilization 12/04/2001     Past Surgical History:   Procedure Laterality Date     BREAST SURGERY      breast biopsy     C TOTAL ABDOM HYSTERECTOMY  07/01/2009    Dr Martinez, no cervix     COLONOSCOPY   5/3/2013    Procedure: COLONOSCOPY;  COLONOSCOPY;  Surgeon: Pablito Urbina MD;  Location: [facility]     ENDOMETRIAL SAMPLING (BIOPSY)       LAPAROSCOPY       NOSE SURGERY      birth edilberto removed     wisdom teeth removed       Current Outpatient Medications   Medication Sig Dispense Refill     ASPIRIN PO Take 81 mg by mouth daily        blood glucose (CONTOUR NEXT TEST) test strip Use to test blood sugar 4 times daily. 200 each 11     calcium-vitamin D (CALTRATE) 600-400 MG-UNIT per tablet Take 1 tablet by mouth daily.       furosemide (LASIX) 40 MG tablet TAKE 1 TABLET BY MOUTH 2 TIMES DAILY 180 tablet 0     IBUPROFEN PO        insulin glargine (BASAGLAR KWIKPEN) 100 UNIT/ML pen INJECT 53 UNITS UNDER THE SKIN DAILY. 45 mL 1     insulin pen needle (B-D U/F) 31G X 8 MM miscellaneous See instructions. Once daily when taking basaglar 100 each 3     metFORMIN (GLUCOPHAGE) 1000 MG tablet TAKE 1 TABLET BY MOUTH TWICE A DAY WITH MEALS 180 tablet 3     OZEMPIC, 0.25 OR 0.5 MG/DOSE, 2 MG/1.5ML SOPN INJECT 0.25 MG WEEKLY FOR 4 WEEKS THEN INCREASE TO 0.5 MG WEEKLY       simvastatin (ZOCOR) 40 MG tablet TAKE 1 TABLET BY MOUTH EVERY DAY. *DUE FOR ANNUAL LABS 90 tablet 2     venlafaxine (EFFEXOR-XR) 150 MG 24 hr capsule TAKE 1 CAPSULE BY MOUTH EVERY DAY 90 capsule 1       No Known Allergies     Social History     Tobacco Use     Smoking status: Never Smoker     Smokeless tobacco: Never Used   Substance Use Topics     Alcohol use: No     Family History   Problem Relation Age of Onset     Stomach Cancer Brother      C.A.D. Father 40        s/p quad bypass     Diabetes Type 2  Brother      Hypertension Brother      Hypertension Other      Chronic Obstructive Pulmonary Disease Mother      Lung Cancer Mother      Bladder Cancer Mother      History   Drug Use No         Objective     /70 (BP Location: Left arm, Patient Position: Sitting, Cuff Size: Adult Regular)   Pulse 74   Temp 98.2  F (36.8  C) (Tympanic)   Ht 1.568 m (5'  "1.75\")   Wt 83.5 kg (184 lb)   SpO2 98%   BMI 33.93 kg/m      Physical Exam    GENERAL APPEARANCE: healthy, alert and no distress     EYES: EOMI, PERRL     HENT: ear canals and TM's normal and nose and mouth without ulcers or lesions     NECK: no adenopathy, no asymmetry, masses, or scars and thyroid normal to palpation     RESP: lungs clear to auscultation - no rales, rhonchi or wheezes     BREAST: Not examined     CV: regular rates and rhythm, normal S1 S2, no S3 or S4 and no murmur, click or rub     ABDOMEN:  soft, nontender, no HSM or masses and bowel sounds normal     GU_female: Not examined     MS: Little tenderness right upper extremity no edema in the arm     SKIN: no suspicious lesions or rashes     NEURO: Normal strength and tone, sensory exam grossly normal, mentation intact and speech normal     PSYCH: mentation appears normal. and affect normal     LYMPHATICS: No cervical adenopathy    Lab Test 09/24/20  1007   HGB  --    PLT  --       POTASSIUM 3.6   CR 0.58   A1C 6.4*        Diagnostics:  Recent Results (from the past 24 hour(s))   CBC with platelets and differential    Collection Time: 12/04/20 10:31 AM   Result Value Ref Range    WBC 10.8 4.0 - 11.0 10e9/L    RBC Count 5.00 3.8 - 5.2 10e12/L    Hemoglobin 14.7 11.7 - 15.7 g/dL    Hematocrit 43.2 35.0 - 47.0 %    MCV 86 78 - 100 fl    MCH 29.4 26.5 - 33.0 pg    MCHC 34.0 31.5 - 36.5 g/dL    RDW 13.2 10.0 - 15.0 %    Platelet Count 329 150 - 450 10e9/L    Diff Method Automated Method     % Neutrophils 42.0 %    % Lymphocytes 48.6 %    % Monocytes 6.5 %    % Eosinophils 1.5 %    % Basophils 0.8 %    % Immature Granulocytes 0.6 %    Nucleated RBCs 0 0 /100    Absolute Neutrophil 4.5 1.6 - 8.3 10e9/L    Absolute Lymphocytes 5.2 0.8 - 5.3 10e9/L    Absolute Monocytes 0.7 0.0 - 1.3 10e9/L    Absolute Eosinophils 0.2 0.0 - 0.7 10e9/L    Absolute Basophils 0.1 0.0 - 0.2 10e9/L    Abs Immature Granulocytes 0.1 0 - 0.4 10e9/L    Absolute Nucleated RBC " 0.0     RBC Morphology Consistent with reported results     Platelet Estimate       Automated count confirmed.  Platelet morphology is normal.   Basic metabolic panel    Collection Time: 12/04/20 10:31 AM   Result Value Ref Range    Sodium 137 133 - 144 mmol/L    Potassium 4.0 3.4 - 5.3 mmol/L    Chloride 101 94 - 109 mmol/L    Carbon Dioxide 27 20 - 32 mmol/L    Anion Gap 9 3 - 14 mmol/L    Glucose 167 (H) 70 - 99 mg/dL    Urea Nitrogen 10 7 - 30 mg/dL    Creatinine 0.64 0.52 - 1.04 mg/dL    GFR Estimate >90 >60 mL/min/[1.73_m2]    GFR Estimate If Black >90 >60 mL/min/[1.73_m2]    Calcium 9.2 8.5 - 10.1 mg/dL      EKG: appears normal, NSR, normal axis, normal intervals, no acute ST/T changes c/w ischemia, no LVH by voltage criteria    Revised Cardiac Risk Index (RCRI):  The patient has the following serious cardiovascular risks for perioperative complications:   - Diabetes Mellitus (on Insulin) = 1 point     RCRI Interpretation: 0 points: Class I (very low risk - 0.4% complication rate)           Assessment & Plan   The proposed surgical procedure is considered INTERMEDIATE risk.    Preop general physical exam    - CBC with platelets and differential  - Basic metabolic panel  - EKG 12-lead complete w/read - (Clinic Performed)    Encounter for laboratory testing for COVID-19 virus    - Asymptomatic COVID-19 Virus (Coronavirus) by PCR  - Asymptomatic COVID-19 Virus (Coronavirus) by PCR    Type 2 diabetes mellitus without complication, with long-term current use of insulin (H)           Risks and Recommendations:  The patient has the following additional risks and recommendations for perioperative complications:  Diabetes:  - Patient is on insulin therapy; diabetic NPO guidelines provided and discussed.    Medication Instructions:  Patient will hold her aspirin for a week prior.  Will take half of her usual evening insulin the night before the procedure and will be n.p.o. after midnight.  She is on a diuretic but will  not take it the morning of the procedure.  She can do 4 METS without difficulty.  Note in January 2019 had a coronary CT angiogram that did not show any worrisome lesions.  Did get her Covid test today.  She is diabetic her A1c was 6.4 in September of this year.  Note she does not smoke. She has had a hysterectomy.    RECOMMENDATION:  APPROVAL GIVEN to proceed with proposed procedure, without further diagnostic evaluation.    Signed Electronically by: TRACEY Henson MD    Copy of this evaluation report is provided to requesting physician.    [facility]    Revised Cardiac Risk Index

## 2020-12-04 ENCOUNTER — OFFICE VISIT (OUTPATIENT)
Dept: FAMILY MEDICINE | Facility: OTHER | Age: 60
End: 2020-12-04
Attending: FAMILY MEDICINE
Payer: COMMERCIAL

## 2020-12-04 VITALS
BODY MASS INDEX: 33.86 KG/M2 | OXYGEN SATURATION: 98 % | SYSTOLIC BLOOD PRESSURE: 126 MMHG | TEMPERATURE: 98.2 F | HEART RATE: 74 BPM | WEIGHT: 184 LBS | DIASTOLIC BLOOD PRESSURE: 70 MMHG | HEIGHT: 62 IN

## 2020-12-04 DIAGNOSIS — Z20.822 ENCOUNTER FOR LABORATORY TESTING FOR COVID-19 VIRUS: ICD-10-CM

## 2020-12-04 DIAGNOSIS — Z79.4 TYPE 2 DIABETES MELLITUS WITHOUT COMPLICATION, WITH LONG-TERM CURRENT USE OF INSULIN (H): ICD-10-CM

## 2020-12-04 DIAGNOSIS — E11.9 TYPE 2 DIABETES MELLITUS WITHOUT COMPLICATION, WITH LONG-TERM CURRENT USE OF INSULIN (H): ICD-10-CM

## 2020-12-04 DIAGNOSIS — Z01.818 PREOP GENERAL PHYSICAL EXAM: Primary | ICD-10-CM

## 2020-12-04 LAB
ANION GAP SERPL CALCULATED.3IONS-SCNC: 9 MMOL/L (ref 3–14)
BASOPHILS # BLD AUTO: 0.1 10E9/L (ref 0–0.2)
BASOPHILS NFR BLD AUTO: 0.8 %
BUN SERPL-MCNC: 10 MG/DL (ref 7–30)
CALCIUM SERPL-MCNC: 9.2 MG/DL (ref 8.5–10.1)
CHLORIDE SERPL-SCNC: 101 MMOL/L (ref 94–109)
CO2 SERPL-SCNC: 27 MMOL/L (ref 20–32)
CREAT SERPL-MCNC: 0.64 MG/DL (ref 0.52–1.04)
DIFFERENTIAL METHOD BLD: NORMAL
EOSINOPHIL # BLD AUTO: 0.2 10E9/L (ref 0–0.7)
EOSINOPHIL NFR BLD AUTO: 1.5 %
ERYTHROCYTE [DISTWIDTH] IN BLOOD BY AUTOMATED COUNT: 13.2 % (ref 10–15)
GFR SERPL CREATININE-BSD FRML MDRD: >90 ML/MIN/{1.73_M2}
GLUCOSE SERPL-MCNC: 167 MG/DL (ref 70–99)
HCT VFR BLD AUTO: 43.2 % (ref 35–47)
HGB BLD-MCNC: 14.7 G/DL (ref 11.7–15.7)
IMM GRANULOCYTES # BLD: 0.1 10E9/L (ref 0–0.4)
IMM GRANULOCYTES NFR BLD: 0.6 %
LYMPHOCYTES # BLD AUTO: 5.2 10E9/L (ref 0.8–5.3)
LYMPHOCYTES NFR BLD AUTO: 48.6 %
MCH RBC QN AUTO: 29.4 PG (ref 26.5–33)
MCHC RBC AUTO-ENTMCNC: 34 G/DL (ref 31.5–36.5)
MCV RBC AUTO: 86 FL (ref 78–100)
MONOCYTES # BLD AUTO: 0.7 10E9/L (ref 0–1.3)
MONOCYTES NFR BLD AUTO: 6.5 %
NEUTROPHILS # BLD AUTO: 4.5 10E9/L (ref 1.6–8.3)
NEUTROPHILS NFR BLD AUTO: 42 %
NRBC # BLD AUTO: 0 10*3/UL
NRBC BLD AUTO-RTO: 0 /100
PLATELET # BLD AUTO: 329 10E9/L (ref 150–450)
PLATELET # BLD EST: NORMAL 10*3/UL
POTASSIUM SERPL-SCNC: 4 MMOL/L (ref 3.4–5.3)
RBC # BLD AUTO: 5 10E12/L (ref 3.8–5.2)
RBC MORPH BLD: NORMAL
SODIUM SERPL-SCNC: 137 MMOL/L (ref 133–144)
WBC # BLD AUTO: 10.8 10E9/L (ref 4–11)

## 2020-12-04 PROCEDURE — 99214 OFFICE O/P EST MOD 30 MIN: CPT | Performed by: FAMILY MEDICINE

## 2020-12-04 PROCEDURE — 85025 COMPLETE CBC W/AUTO DIFF WBC: CPT | Performed by: FAMILY MEDICINE

## 2020-12-04 PROCEDURE — 80048 BASIC METABOLIC PNL TOTAL CA: CPT | Performed by: FAMILY MEDICINE

## 2020-12-04 PROCEDURE — 36415 COLL VENOUS BLD VENIPUNCTURE: CPT | Performed by: FAMILY MEDICINE

## 2020-12-04 PROCEDURE — 93000 ELECTROCARDIOGRAM COMPLETE: CPT | Performed by: INTERNAL MEDICINE

## 2020-12-04 PROCEDURE — U0003 INFECTIOUS AGENT DETECTION BY NUCLEIC ACID (DNA OR RNA); SEVERE ACUTE RESPIRATORY SYNDROME CORONAVIRUS 2 (SARS-COV-2) (CORONAVIRUS DISEASE [COVID-19]), AMPLIFIED PROBE TECHNIQUE, MAKING USE OF HIGH THROUGHPUT TECHNOLOGIES AS DESCRIBED BY CMS-2020-01-R: HCPCS | Performed by: FAMILY MEDICINE

## 2020-12-04 ASSESSMENT — PAIN SCALES - GENERAL: PAINLEVEL: MILD PAIN (2)

## 2020-12-04 ASSESSMENT — MIFFLIN-ST. JEOR: SCORE: 1363.9

## 2020-12-04 NOTE — NURSING NOTE
"Chief Complaint   Patient presents with     Pre-Op Exam       Initial /70 (BP Location: Left arm, Patient Position: Sitting, Cuff Size: Adult Regular)   Pulse 74   Temp 98.2  F (36.8  C) (Tympanic)   Ht 1.568 m (5' 1.75\")   Wt 83.5 kg (184 lb)   SpO2 98%   BMI 33.93 kg/m   Estimated body mass index is 33.93 kg/m  as calculated from the following:    Height as of this encounter: 1.568 m (5' 1.75\").    Weight as of this encounter: 83.5 kg (184 lb).  Medication Reconciliation: complete  Brandie Lee LPN  "

## 2020-12-05 LAB
SARS-COV-2 RNA SPEC QL NAA+PROBE: NOT DETECTED
SPECIMEN SOURCE: NORMAL

## 2020-12-22 ENCOUNTER — TRANSFERRED RECORDS (OUTPATIENT)
Dept: HEALTH INFORMATION MANAGEMENT | Facility: CLINIC | Age: 60
End: 2020-12-22

## 2020-12-27 ENCOUNTER — MYC MEDICAL ADVICE (OUTPATIENT)
Dept: FAMILY MEDICINE | Facility: OTHER | Age: 60
End: 2020-12-27

## 2020-12-27 DIAGNOSIS — S42.294S OTHER CLOSED NONDISPLACED FRACTURE OF PROXIMAL END OF RIGHT HUMERUS, SEQUELA: ICD-10-CM

## 2020-12-27 DIAGNOSIS — M25.511 RIGHT SHOULDER PAIN, UNSPECIFIED CHRONICITY: Primary | ICD-10-CM

## 2021-01-14 DIAGNOSIS — I10 BENIGN ESSENTIAL HYPERTENSION: ICD-10-CM

## 2021-01-14 RX ORDER — FUROSEMIDE 40 MG
TABLET ORAL
Qty: 180 TABLET | Refills: 0 | Status: SHIPPED | OUTPATIENT
Start: 2021-01-14 | End: 2021-05-10

## 2021-01-15 ENCOUNTER — HEALTH MAINTENANCE LETTER (OUTPATIENT)
Age: 61
End: 2021-01-15

## 2021-01-19 DIAGNOSIS — Z79.4 TYPE 2 DIABETES MELLITUS WITHOUT COMPLICATION, WITH LONG-TERM CURRENT USE OF INSULIN (H): ICD-10-CM

## 2021-01-19 DIAGNOSIS — E11.9 TYPE 2 DIABETES MELLITUS WITHOUT COMPLICATION, WITH LONG-TERM CURRENT USE OF INSULIN (H): ICD-10-CM

## 2021-01-19 RX ORDER — INSULIN GLARGINE 100 [IU]/ML
INJECTION, SOLUTION SUBCUTANEOUS
Qty: 45 ML | Refills: 1 | Status: SHIPPED | OUTPATIENT
Start: 2021-01-19 | End: 2021-07-16

## 2021-03-09 DIAGNOSIS — F34.1 DYSTHYMIA: ICD-10-CM

## 2021-03-09 DIAGNOSIS — E78.2 MIXED HYPERLIPIDEMIA: ICD-10-CM

## 2021-03-09 RX ORDER — VENLAFAXINE HYDROCHLORIDE 150 MG/1
CAPSULE, EXTENDED RELEASE ORAL
Qty: 90 CAPSULE | Refills: 1 | Status: SHIPPED | OUTPATIENT
Start: 2021-03-09 | End: 2021-06-21

## 2021-03-09 RX ORDER — SIMVASTATIN 40 MG
TABLET ORAL
Qty: 90 TABLET | Refills: 1 | Status: SHIPPED | OUTPATIENT
Start: 2021-03-09 | End: 2021-09-16

## 2021-03-24 ENCOUNTER — IMMUNIZATION (OUTPATIENT)
Dept: FAMILY MEDICINE | Facility: OTHER | Age: 61
End: 2021-03-24
Attending: FAMILY MEDICINE
Payer: COMMERCIAL

## 2021-03-24 PROCEDURE — 91300 PR COVID VAC PFIZER DIL RECON 30 MCG/0.3 ML IM: CPT

## 2021-03-24 PROCEDURE — 0001A PR COVID VAC PFIZER DIL RECON 30 MCG/0.3 ML IM: CPT

## 2021-04-12 ENCOUNTER — IMMUNIZATION (OUTPATIENT)
Dept: FAMILY MEDICINE | Facility: OTHER | Age: 61
End: 2021-04-12
Attending: FAMILY MEDICINE
Payer: COMMERCIAL

## 2021-04-12 PROCEDURE — 91300 PR COVID VAC PFIZER DIL RECON 30 MCG/0.3 ML IM: CPT

## 2021-04-12 PROCEDURE — 0002A PR COVID VAC PFIZER DIL RECON 30 MCG/0.3 ML IM: CPT

## 2021-05-09 DIAGNOSIS — I10 BENIGN ESSENTIAL HYPERTENSION: ICD-10-CM

## 2021-05-10 RX ORDER — FUROSEMIDE 40 MG
TABLET ORAL
Qty: 180 TABLET | Refills: 3 | Status: SHIPPED | OUTPATIENT
Start: 2021-05-10 | End: 2022-10-26

## 2021-05-22 ENCOUNTER — HEALTH MAINTENANCE LETTER (OUTPATIENT)
Age: 61
End: 2021-05-22

## 2021-06-11 NOTE — PROGRESS NOTES
"    Assessment & Plan     Type 2 diabetes mellitus with hyperglycemia, with long-term current use of insulin (H)  Doing well, but the sugars have likely been high per her report.  She has not been checking.  Will follow.   - Comprehensive metabolic panel  - Lipid Profile  - Hemoglobin A1c  - TSH with free T4 reflex    Encounter for screening mammogram for breast cancer  [place].   - MA Screen Bilateral w/Hector; Future    Need for vaccination  Given.   - SHINGRIX [[id number]]    Other fatigue  Reviewed.  Consider sleep.  Consider mood.  Consider hyperglycemia as well.  Cbc stable.    - CBC with platelets and differential    [place] disease  Reviewed.  abx to keep on hand for the recurrent cellulitis.  OT for the milroy to maybe help the edema which has worsened.    - OCCUPATIONAL THERAPY REFERRAL; Future  - sulfamethoxazole-trimethoprim (BACTRIM DS) 800-160 MG tablet; Take 1 tablet by mouth 2 times daily for 7 days  - cephALEXin (KEFLEX) 500 MG capsule; Take 1 capsule (500 mg) by mouth 3 times daily    Chronic edema  As above.    - OCCUPATIONAL THERAPY REFERRAL; Future  - sulfamethoxazole-trimethoprim (BACTRIM DS) 800-160 MG tablet; Take 1 tablet by mouth 2 times daily for 7 days  - cephALEXin (KEFLEX) 500 MG capsule; Take 1 capsule (500 mg) by mouth 3 times daily      25 minutes spent on the date of the encounter doing patient visit and documentation        BMI:   Estimated body mass index is 35.12 kg/m  as calculated from the following:    Height as of this encounter: 1.575 m (5' 2\").    Weight as of this encounter: 87.1 kg (192 lb).           No follow-ups on file.    Nathanael Mena MD  [facility]   Suzy is a 60 year old who presents for the following health issues     HPI     Diabetes Follow-up      How often are you checking your blood sugar? Not at all    What concerns do you have today about your diabetes? None     Do you have any of these symptoms? (Select all that apply)  " "No numbness or tingling in feet.  No redness, sores or blisters on feet.  No complaints of excessive thirst.  No reports of blurry vision.  No significant changes to weight.      BP Readings from Last 2 Encounters:   06/23/21 126/72   12/04/20 126/70     Hemoglobin A1C (%)   Date Value   09/24/2020 6.4 (H)   06/19/2020 6.6 (H)     LDL Cholesterol Calculated (mg/dL)   Date Value   06/19/2020 73   05/07/2019 76               Hypertension Follow-up      Do you check your blood pressure regularly outside of the clinic? No     Are you following a low salt diet? Yes    Are your blood pressures ever more than 140 on the top number (systolic) OR more   than 90 on the bottom number (diastolic), for example 140/90? NA          Review of Systems   Constitutional, HEENT, cardiovascular, pulmonary, gi and gu systems are negative, except as otherwise noted.      Objective    /72   Pulse 65   Temp 98.3  F (36.8  C)   Ht 1.575 m (5' 2\")   Wt 87.1 kg (192 lb)   SpO2 99%   BMI 35.12 kg/m    Body mass index is 35.12 kg/m .  Physical Exam   GENERAL: healthy, alert and no distress  NECK: no adenopathy, no asymmetry, masses, or scars and thyroid normal to palpation  RESP: lungs clear to auscultation - no rales, rhonchi or wheezes  CV: regular rate and rhythm, normal S1 S2, no S3 or S4, no murmur, click or rub, no peripheral edema and peripheral pulses strong  ABDOMEN: soft, nontender, no hepatosplenomegaly, no masses and bowel sounds normal  MS: no gross musculoskeletal defects noted, chronic edema noted.      Full labs pending.             "

## 2021-06-21 DIAGNOSIS — F34.1 DYSTHYMIA: ICD-10-CM

## 2021-06-21 RX ORDER — VENLAFAXINE HYDROCHLORIDE 150 MG/1
CAPSULE, EXTENDED RELEASE ORAL
Qty: 90 CAPSULE | Refills: 1 | Status: SHIPPED | OUTPATIENT
Start: 2021-06-21 | End: 2021-12-13

## 2021-06-21 ASSESSMENT — ANXIETY QUESTIONNAIRES
6. BECOMING EASILY ANNOYED OR IRRITABLE: NOT AT ALL
3. WORRYING TOO MUCH ABOUT DIFFERENT THINGS: NOT AT ALL
GAD7 TOTAL SCORE: 0
1. FEELING NERVOUS, ANXIOUS, OR ON EDGE: NOT AT ALL
GAD7 TOTAL SCORE: 0
7. FEELING AFRAID AS IF SOMETHING AWFUL MIGHT HAPPEN: NOT AT ALL
4. TROUBLE RELAXING: NOT AT ALL
5. BEING SO RESTLESS THAT IT IS HARD TO SIT STILL: NOT AT ALL
2. NOT BEING ABLE TO STOP OR CONTROL WORRYING: NOT AT ALL
GAD7 TOTAL SCORE: 0
7. FEELING AFRAID AS IF SOMETHING AWFUL MIGHT HAPPEN: NOT AT ALL

## 2021-06-21 ASSESSMENT — PATIENT HEALTH QUESTIONNAIRE - PHQ9
SUM OF ALL RESPONSES TO PHQ QUESTIONS 1-9: 2
SUM OF ALL RESPONSES TO PHQ QUESTIONS 1-9: 2
10. IF YOU CHECKED OFF ANY PROBLEMS, HOW DIFFICULT HAVE THESE PROBLEMS MADE IT FOR YOU TO DO YOUR WORK, TAKE CARE OF THINGS AT HOME, OR GET ALONG WITH OTHER PEOPLE: NOT DIFFICULT AT ALL

## 2021-06-21 NOTE — TELEPHONE ENCOUNTER
effexor      Last Written Prescription Date:  3/9/21  Last Fill Quantity: 90,   # refills: 1  Last Office Visit: 6/23/21  Future Office visit:    Next 5 appointments (look out 90 days)    Jun 23, 2021 10:45 AM  (Arrive by 10:30 AM)  [facility] with [facility] [address]  [facility] [place] [address]  [phone]

## 2021-06-22 ASSESSMENT — ANXIETY QUESTIONNAIRES: GAD7 TOTAL SCORE: 0

## 2021-06-23 ENCOUNTER — OFFICE VISIT (OUTPATIENT)
Dept: FAMILY MEDICINE | Facility: OTHER | Age: 61
End: 2021-06-23
Attending: FAMILY MEDICINE
Payer: COMMERCIAL

## 2021-06-23 VITALS
DIASTOLIC BLOOD PRESSURE: 72 MMHG | BODY MASS INDEX: 35.33 KG/M2 | SYSTOLIC BLOOD PRESSURE: 126 MMHG | HEIGHT: 62 IN | TEMPERATURE: 98.3 F | HEART RATE: 65 BPM | OXYGEN SATURATION: 99 % | WEIGHT: 192 LBS

## 2021-06-23 DIAGNOSIS — R53.83 OTHER FATIGUE: ICD-10-CM

## 2021-06-23 DIAGNOSIS — Z23 NEED FOR VACCINATION: ICD-10-CM

## 2021-06-23 DIAGNOSIS — Z79.4 TYPE 2 DIABETES MELLITUS WITH HYPERGLYCEMIA, WITH LONG-TERM CURRENT USE OF INSULIN (H): Primary | ICD-10-CM

## 2021-06-23 DIAGNOSIS — E11.65 TYPE 2 DIABETES MELLITUS WITH HYPERGLYCEMIA, WITH LONG-TERM CURRENT USE OF INSULIN (H): Primary | ICD-10-CM

## 2021-06-23 DIAGNOSIS — Q82.0: ICD-10-CM

## 2021-06-23 DIAGNOSIS — Z12.31 ENCOUNTER FOR SCREENING MAMMOGRAM FOR BREAST CANCER: ICD-10-CM

## 2021-06-23 DIAGNOSIS — R60.9 CHRONIC EDEMA: ICD-10-CM

## 2021-06-23 LAB
ALBUMIN SERPL-MCNC: 4.2 G/DL (ref 3.4–5)
ALP SERPL-CCNC: 101 U/L (ref 40–150)
ALT SERPL W P-5'-P-CCNC: 40 U/L (ref 0–50)
ANION GAP SERPL CALCULATED.3IONS-SCNC: 8 MMOL/L (ref 3–14)
AST SERPL W P-5'-P-CCNC: 38 U/L (ref 0–45)
BASOPHILS # BLD AUTO: 0.1 10E9/L (ref 0–0.2)
BASOPHILS NFR BLD AUTO: 0.7 %
BILIRUB SERPL-MCNC: 0.5 MG/DL (ref 0.2–1.3)
BUN SERPL-MCNC: 8 MG/DL (ref 7–30)
CALCIUM SERPL-MCNC: 9 MG/DL (ref 8.5–10.1)
CHLORIDE SERPL-SCNC: 103 MMOL/L (ref 94–109)
CHOLEST SERPL-MCNC: 162 MG/DL
CO2 SERPL-SCNC: 28 MMOL/L (ref 20–32)
CREAT SERPL-MCNC: 0.66 MG/DL (ref 0.52–1.04)
DIFFERENTIAL METHOD BLD: NORMAL
EOSINOPHIL # BLD AUTO: 0.2 10E9/L (ref 0–0.7)
EOSINOPHIL NFR BLD AUTO: 2.2 %
ERYTHROCYTE [DISTWIDTH] IN BLOOD BY AUTOMATED COUNT: 13.4 % (ref 10–15)
EST. AVERAGE GLUCOSE BLD GHB EST-MCNC: 177 MG/DL
GFR SERPL CREATININE-BSD FRML MDRD: >90 ML/MIN/{1.73_M2}
GLUCOSE SERPL-MCNC: 94 MG/DL (ref 70–99)
HBA1C MFR BLD: 7.8 % (ref 0–5.6)
HCT VFR BLD AUTO: 40.7 % (ref 35–47)
HDLC SERPL-MCNC: 37 MG/DL
HGB BLD-MCNC: 13.8 G/DL (ref 11.7–15.7)
LDLC SERPL CALC-MCNC: 74 MG/DL
LYMPHOCYTES # BLD AUTO: 3.8 10E9/L (ref 0.8–5.3)
LYMPHOCYTES NFR BLD AUTO: 46.2 %
MCH RBC QN AUTO: 29.9 PG (ref 26.5–33)
MCHC RBC AUTO-ENTMCNC: 33.9 G/DL (ref 31.5–36.5)
MCV RBC AUTO: 88 FL (ref 78–100)
MONOCYTES # BLD AUTO: 0.6 10E9/L (ref 0–1.3)
MONOCYTES NFR BLD AUTO: 7.1 %
NEUTROPHILS # BLD AUTO: 3.6 10E9/L (ref 1.6–8.3)
NEUTROPHILS NFR BLD AUTO: 43.8 %
NONHDLC SERPL-MCNC: 125 MG/DL
PLATELET # BLD AUTO: 308 10E9/L (ref 150–450)
POTASSIUM SERPL-SCNC: 3.5 MMOL/L (ref 3.4–5.3)
PROT SERPL-MCNC: 7.7 G/DL (ref 6.8–8.8)
RBC # BLD AUTO: 4.61 10E12/L (ref 3.8–5.2)
SODIUM SERPL-SCNC: 139 MMOL/L (ref 133–144)
TRIGL SERPL-MCNC: 257 MG/DL
TSH SERPL DL<=0.005 MIU/L-ACNC: 3.71 MU/L (ref 0.4–4)
WBC # BLD AUTO: 8.2 10E9/L (ref 4–11)

## 2021-06-23 PROCEDURE — 90750 HZV VACC RECOMBINANT IM: CPT | Performed by: FAMILY MEDICINE

## 2021-06-23 PROCEDURE — 36415 COLL VENOUS BLD VENIPUNCTURE: CPT | Performed by: FAMILY MEDICINE

## 2021-06-23 PROCEDURE — 83036 HEMOGLOBIN GLYCOSYLATED A1C: CPT | Performed by: FAMILY MEDICINE

## 2021-06-23 PROCEDURE — 80050 GENERAL HEALTH PANEL: CPT | Performed by: FAMILY MEDICINE

## 2021-06-23 PROCEDURE — 80061 LIPID PANEL: CPT | Performed by: FAMILY MEDICINE

## 2021-06-23 PROCEDURE — 90471 IMMUNIZATION ADMIN: CPT | Performed by: FAMILY MEDICINE

## 2021-06-23 PROCEDURE — 99214 OFFICE O/P EST MOD 30 MIN: CPT | Mod: 25 | Performed by: FAMILY MEDICINE

## 2021-06-23 RX ORDER — CEPHALEXIN 500 MG/1
500 CAPSULE ORAL 3 TIMES DAILY
Qty: 21 CAPSULE | Refills: 0 | Status: SHIPPED | OUTPATIENT
Start: 2021-06-23 | End: 2021-08-18

## 2021-06-23 RX ORDER — SULFAMETHOXAZOLE/TRIMETHOPRIM 800-160 MG
1 TABLET ORAL 2 TIMES DAILY
Qty: 14 TABLET | Refills: 0 | Status: SHIPPED | OUTPATIENT
Start: 2021-06-23 | End: 2022-06-13

## 2021-06-23 ASSESSMENT — PAIN SCALES - GENERAL: PAINLEVEL: MODERATE PAIN (4)

## 2021-06-23 ASSESSMENT — PATIENT HEALTH QUESTIONNAIRE - PHQ9: SUM OF ALL RESPONSES TO PHQ QUESTIONS 1-9: 2

## 2021-06-23 ASSESSMENT — MIFFLIN-ST. JEOR: SCORE: 1404.16

## 2021-06-23 NOTE — NURSING NOTE
"Chief Complaint   Patient presents with     Diabetes       Initial /72   Pulse 65   Temp 98.3  F (36.8  C)   Ht 1.575 m (5' 2\")   Wt 87.1 kg (192 lb)   SpO2 99%   BMI 35.12 kg/m   Estimated body mass index is 35.12 kg/m  as calculated from the following:    Height as of this encounter: 1.575 m (5' 2\").    Weight as of this encounter: 87.1 kg (192 lb).  Medication Reconciliation: complete  Diane Mccall LPN  "

## 2021-06-29 ENCOUNTER — HOSPITAL ENCOUNTER (OUTPATIENT)
Dept: OCCUPATIONAL THERAPY | Facility: HOSPITAL | Age: 61
Setting detail: THERAPIES SERIES
End: 2021-06-29
Attending: FAMILY MEDICINE
Payer: COMMERCIAL

## 2021-06-29 DIAGNOSIS — Q82.0: ICD-10-CM

## 2021-06-29 DIAGNOSIS — R60.9 CHRONIC EDEMA: ICD-10-CM

## 2021-06-29 PROCEDURE — 97535 SELF CARE MNGMENT TRAINING: CPT | Mod: GO

## 2021-06-29 PROCEDURE — 97166 OT EVAL MOD COMPLEX 45 MIN: CPT | Mod: GO

## 2021-06-30 ENCOUNTER — ANCILLARY PROCEDURE (OUTPATIENT)
Dept: MAMMOGRAPHY | Facility: OTHER | Age: 61
End: 2021-06-30
Attending: FAMILY MEDICINE
Payer: COMMERCIAL

## 2021-06-30 DIAGNOSIS — Z12.31 ENCOUNTER FOR SCREENING MAMMOGRAM FOR BREAST CANCER: ICD-10-CM

## 2021-06-30 PROCEDURE — 77067 SCR MAMMO BI INCL CAD: CPT | Mod: TC | Performed by: RADIOLOGY

## 2021-06-30 PROCEDURE — 77063 BREAST TOMOSYNTHESIS BI: CPT | Mod: TC | Performed by: RADIOLOGY

## 2021-06-30 NOTE — PROGRESS NOTES
06/29/21 1500   Signing Clinician's Name / Credentials   Signing clinician's name / credentials Brandie Mckenna OTR/L,CLT   Discipline   Discipline OT   Lower Extremity Girth Measurements   RT: Great Toe 10.5 cms   RT: MTP's 25 cms   RT: Arch of Foot 26 cms   RT: Calcaneous 33.2   RT: Malleolus 27.5 cms   RT: 10 cm above heel 26.9 cms   RT: 20 cm above heel 32 cms   RT: 30 cm above heel 39 cms   RT: 40 cm above heel 34.4 cms   RT: 50 cm above heel 45.8 cms   RT: 60 cm above heel 56.5 cms   RT: 70 cm above heel 62 cms   RT: Lower Extremity Total Volume 8935.99   LT: Great Toe 10.4 cms   LT: MTP's 24 cms   LT: Arch of Foot 25.5 cms   LT: Calcaneous 32.8   LT:  Malleolus 27.5 cms   LT: 10 cm above heel 26.5 cms   LT: 20 cm above heel 31 cms   LT: 30 cm above heel 37.5 cms   LT: 40 cm above heel 35.1 cms   LT: 50 cm above heel 47.4 cms   LT: 60 cm above heel 55.4 cms   LT: 70 cm above heel 62.5 cms   LT: Lower Extremity Total Volume 8875.2

## 2021-06-30 NOTE — PROGRESS NOTES
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 Pt has had a lymphedema pump in the past but it doesn't work now. She also used to have compression garments but they are older and streched out and she needs new ones.Pt reports she likes to walk but has not been walking much lately and her legs burn and get more swollen after walking   Surgical / medical history reviewed Yes   Edema special tests Ultrasound   Prior level of functional mobility independent   Prior treatment Compression garments;Compression pump;Elevation;Diuretics   Patient role / employment history Retired   Psychosocial concerns Impaired body image;Depression   Living environment [facility]   Assistive device comments pt previously had a lymphedema pump but it is not working   General observations Pt presented to evaluation alone   Fall Risk Screen   Fall screen completed by OT   Have you fallen 2 or more times in the past year? Yes   Have you fallen and had an injury in the past year? No   Is patient a fall risk? No   Fall screen comments pt had a fall down a hill    Abuse Screen (yes response referral indicated)   Feels Unsafe at Home or Work/School no   Feels Threatened by Someone no   Does Anyone Try to Keep You From Having Contact with Others or Doing Things Outside Your Home? no   Physical Signs of Abuse Present no   System Outcome Measures   Outcome Measures  Lymphedema   Subjective Report   Patient report of symptoms burning in legs, heaviness, tightness, fatigue in legs   Patient / Family Goals   Patient / family goals statement to be able to walk without    Pain   Patient currently in pain No   Vitals Signs   Weight 192   BMI 35.12   Cognitive Status   Orientation Orientation to person, place and time   Level of consciousness Alert   Follows commands and answers questions 100% of the time   Personal safety and judgement Intact   Memory Intact   Edema Exam / Assessment   Skin condition Pitting   Pitting 1+   Pitting location B lower legs, feet   Scar No   Capillary refill Symmetrical   Stemmer sign Positive   Ulceration No   Girth Measurements   Girth Measurements Refer to separate girth measurement flowsheet   Volume LE   Right LE (mL) 8935.99   Left LE (mL) 8875.2   LE volume comparison RLE volume greater than LLE volume   % difference 0.6   Range of Motion   ROM No deficits were identified   Strength   Strength No deficits were identified   Posture   Posture Normal   Activities of Daily Living   Activities of Daily Living independent   Bed Mobility   Bed mobility independent   Transfers   Transfers independent   Gait / Locomotion   Gait / Locomotion independent, no deficits   Coordination   Coordination Gross motor coordination appropriate   Muscle Tone   Muscle tone No deficits were identified   Planned Edema Interventions   Planned edema interventions Home management program development;Precautions to prevent infection / exacerbation;Exercises;Education;Gradient compression bandaging  (pt would like to get new pump and garments )   Planned edema intervention comments Pt would like to get new pump and garments and is open to education and staring a home program to manage lymphedema   Clinical Impression   Criteria for skilled therapeutic intervention met Yes   Therapy diagnosis edema   Influenced by the following impairments / conditions Primary Lymphedema    Assessment of Occupational Performance 1-3 Performance Deficits   Identified Performance Deficits pain, mobility   Clinical Decision Making (Complexity) Moderate complexity   Treatment Frequency Other (see comments)  (1x/wk)   Treatment duration 4-6 wks   Patient / family and/or staff in agreement with plan of care Yes   Risks and benefits of therapy have been explained Yes   Clinical impression comments Pt has primary lymphedema in B LEs as a result of congential disease. Pt has had a lymphedema pump and compression garments but pump is not working and her garments do not fit. Pt would benefit from OT services to establish a HEP and get needed items to manage chronic lymphedema.   Education Assessment   Preferred learning style Listening;Demonstration   Barriers to learning No barriers   Goals   Edema Eval Goals 1;2;3   Goal 1   Goal identifier LTG 1   Goal description Pt will get compression garments to manage lymphedema.   Target date 07/30/21   Goal 2   Goal identifier STG 1   Goal description Pt will be able to follow HEP consistently for 2 weeks   Target date 07/09/21   Goal 3   Goal identifier LTG 2   Goal description Pt will be able to demonstrate self massage and bandaging to manage lymphedema   Target date 07/30/21   Total Evaluation Time   OT Servando, Moderate Complexity Minutes (20193) 60

## 2021-07-07 ENCOUNTER — HOSPITAL ENCOUNTER (OUTPATIENT)
Dept: OCCUPATIONAL THERAPY | Facility: HOSPITAL | Age: 61
Setting detail: THERAPIES SERIES
End: 2021-07-07
Attending: FAMILY MEDICINE
Payer: COMMERCIAL

## 2021-07-07 PROCEDURE — 97535 SELF CARE MNGMENT TRAINING: CPT | Mod: GO

## 2021-07-15 DIAGNOSIS — Z79.4 TYPE 2 DIABETES MELLITUS WITHOUT COMPLICATION, WITH LONG-TERM CURRENT USE OF INSULIN (H): ICD-10-CM

## 2021-07-15 DIAGNOSIS — E11.9 TYPE 2 DIABETES MELLITUS WITHOUT COMPLICATION, WITH LONG-TERM CURRENT USE OF INSULIN (H): ICD-10-CM

## 2021-07-15 DIAGNOSIS — Z79.4 TYPE 2 DIABETES MELLITUS WITH HYPERGLYCEMIA, WITH LONG-TERM CURRENT USE OF INSULIN (H): ICD-10-CM

## 2021-07-15 DIAGNOSIS — E11.65 TYPE 2 DIABETES MELLITUS WITH HYPERGLYCEMIA, WITH LONG-TERM CURRENT USE OF INSULIN (H): ICD-10-CM

## 2021-07-16 ENCOUNTER — TELEPHONE (OUTPATIENT)
Dept: FAMILY MEDICINE | Facility: OTHER | Age: 61
End: 2021-07-16

## 2021-07-16 RX ORDER — INSULIN GLARGINE 100 [IU]/ML
INJECTION, SOLUTION SUBCUTANEOUS
Qty: 45 ML | Refills: 1 | Status: SHIPPED | OUTPATIENT
Start: 2021-07-16 | End: 2021-12-20

## 2021-07-16 RX ORDER — SEMAGLUTIDE 1.34 MG/ML
INJECTION, SOLUTION SUBCUTANEOUS
Qty: 1.5 ML | Refills: 3 | Status: SHIPPED | OUTPATIENT
Start: 2021-07-16 | End: 2021-10-29

## 2021-07-16 NOTE — TELEPHONE ENCOUNTER
Gene from [facility] in regards to a form he had faxed over yesterday for a compression pump. Was this received?   Please fax back to [phone] when completed    Phone number is [phone]

## 2021-07-27 ENCOUNTER — TELEPHONE (OUTPATIENT)
Dept: FAMILY MEDICINE | Facility: OTHER | Age: 61
End: 2021-07-27

## 2021-07-27 NOTE — TELEPHONE ENCOUNTER
Received fax from [facility] stating she needs a letter stating she needs a Flexitouch lymphedema pump for her lower extremities due to chronic cellulitis.  Ok for letter?

## 2021-07-27 NOTE — LETTER
July 27, 2021      Suzy Gallardo  [address] [facility] [address]        To Whom It May Concern,     Suzy has lymphedema affecting both lower extremities secondary to chronic cellulitis.  Infections have been longstanding and recurrent.  She experiences persistent swelling, difficulty walking and daily pain.  Suzy has used all conventional means of treating the lymphedema, including daily compression, elevation, therapeutic exercise and MLD.  She has also used a basic compression pump for 15 years in effort to control symptoms, however, saw no clinical improvement.  She continues to have significant symptoms and compromised skin that puts her at continued risk of more infections.    Given he complicated clinical scenario, I feel it is necessary to augment these therapies with the more advanced Flexitouch lymphedema pump.  This pump treats the leg and the trunk so as not to create displaced edema to the upper thighs, groin, and abdomen and provides gentle, gradient compression she can tolerate.  Additionally, it has the programmability to treat areas of fibrosis she requires to reduce the risk of infection.  Given her susceptibility to infections, this device is essential for my patient.  I believe that it will provide long term, effective treatment of her lymphedema, minimize progression and complication of her condition, and greatly improve her quality of life.       Sincerely,        Nathanael Mena MD

## 2021-07-29 ENCOUNTER — TELEPHONE (OUTPATIENT)
Dept: FAMILY MEDICINE | Facility: OTHER | Age: 61
End: 2021-07-29

## 2021-07-29 NOTE — TELEPHONE ENCOUNTER
I signed it and will have it faxed. I am hoping they are ok with an NP signature and didn't need MD signature.

## 2021-07-29 NOTE — TELEPHONE ENCOUNTER
Karlee from [facility] called reports she faxed letter medical necessity for compression device flexitouch on 7/26/21. Reports she hasn't hear back yet.     Call back number: [phone]    Requesting covering provider address this, needing to close this by the end of the month per Karlee.     See note below.

## 2021-08-04 ENCOUNTER — HOSPITAL ENCOUNTER (OUTPATIENT)
Dept: OCCUPATIONAL THERAPY | Facility: HOSPITAL | Age: 61
Setting detail: THERAPIES SERIES
End: 2021-08-04
Attending: FAMILY MEDICINE
Payer: COMMERCIAL

## 2021-08-04 PROCEDURE — 97535 SELF CARE MNGMENT TRAINING: CPT | Mod: GO

## 2021-08-05 NOTE — PROGRESS NOTES
Outpatient Occupational Therapy Discharge Note     Patient: Suzy Gallardo  : 1960    Beginning/End Dates of Reporting Period:  2021 to 2021    Referring Provider: Nathanael Mena MD    Therapy Diagnosis: lymphedema    Client Self Report: Pt seen today from [id number], pt reported she was able to get her compression garments and her pump is in the works. She reported less pain and burning in her feet.    Objective Measurements:      Outcome Measures (most recent score):  Lymphedema Life Impact Scale (score range 0-72). A higher score indicates greater impairment.: 20 (decreased from 27 at initial evaluation)    Goals:   Goal Identifier LTG 1   Goal Description Pt will get compression garments to manage lymphedema.   Target Date 21   Date Met  21   Progress (detail required for progress note):     Goal Identifier STG 1   Goal Description Pt will be able to follow HEP consistently for 2 weeks   Target Date 21   Date Met  21   Progress (detail required for progress note):     Goal Identifier LTG 2   Goal Description Pt will be able to demonstrate self massage and bandaging to manage lymphedema   Target Date 21   Date Met  21   Progress (detail required for progress note):     Goal Identifier     Goal Description     Target Date     Date Met      Progress (detail required for progress note):     Goal Identifier     Goal Description     Target Date     Date Met      Progress (detail required for progress note):     Goal Identifier     Goal Description     Target Date     Date Met      Progress (detail required for progress note):     Goal Identifier     Goal Description     Target Date     Date Met      Progress (detail required for progress note):     Goal Identifier     Goal Description     Target Date     Date Met      Progress (detail required for progress note):       Plan:  Discharge from therapy.    Discharge:    Reason for Discharge: Patient has met all  goals.    Equipment Issued: pt obtained compression garments for LEs and is in the process of getting a pump for lymphedema management as well.    Discharge Plan: Patient to continue home program. OT available if needed. Pt has been educated on self massage, exercises, compression garments and bandages.

## 2021-08-17 NOTE — PROGRESS NOTES
SUBJECTIVE:   CC: Suzy Gallardo is an 61 year old woman who presents for preventive health visit.       Patient has been advised of split billing requirements and indicates understanding: Yes  Healthy Habits:     Getting at least 3 servings of Calcium per day:  Yes    Bi-annual eye exam:  Yes    Dental care twice a year:  Yes    Sleep apnea or symptoms of sleep apnea:  None    Diet:  Low salt and Diabetic    Frequency of exercise:  2-3 days/week    Duration of exercise:  15-30 minutes    Taking medications regularly:  Yes    Medication side effects:  None    PHQ-2 Total Score: 0    Additional concerns today:  Yes    Today's PHQ-2 Score:   PHQ-2 ( 1999 Pfizer) 8/15/2021   Q1: Little interest or pleasure in doing things 0   Q2: Feeling down, depressed or hopeless 0   PHQ-2 Score 0   Q1: Little interest or pleasure in doing things Not at all   Q2: Feeling down, depressed or hopeless Not at all   PHQ-2 Score 0       Abuse: Current or Past (Physical, Sexual or Emotional) - No  Do you feel safe in your environment? Yes        Social History     Tobacco Use     Smoking status: Never Smoker     Smokeless tobacco: Never Used   Substance Use Topics     Alcohol use: No     If you drink alcohol do you typically have >3 drinks per day or >7 drinks per week? No    Alcohol Use 8/15/2021   Prescreen: >3 drinks/day or >7 drinks/week? No   No flowsheet data found.    Reviewed orders with patient.  Reviewed health maintenance and updated orders accordingly - Yes      Cancer Screenings:  Colon - due at 62, colonoscopy with 10 year follow up.   Cervical - LEIF 2009  Breast - 6/2021  Lung - Never smoker  Prostate - NA  Skin - No lesions of concerns.     Immunizations:  Tdap - 2020  Zoster - Singrix x 1  Influenza - NA  Prevnar - NA  Pneumovax - 2010  COVID 19: Completed pfizer.     Cardiovascular:  Fasting glucose/Hgb A1C: 7.8 (6/21) has plan in place with PCP.   Cholesterol: ldl 74 6/21, on statin.   ASCVD score:      Other:  Osteoporosis - Normal bone density in 2019.   --Vitamin D - 33 2019    Breast CA Risk Assessment (FHS-7) 8/15/2021   Do you have a family history of breast, colon, or ovarian cancer? No / Unknown        Reviewed and updated as needed this visit by clinical staff  Tobacco  Allergies  Meds   Med Hx  Surg Hx  Fam Hx  Soc Hx        Reviewed and updated as needed this visit by Provider                  Review of Systems  CONSTITUTIONAL: NEGATIVE for fever, chills, change in weight  INTEGUMENTARY/SKIN: NEGATIVE for worrisome rashes, moles or lesions  EYES: NEGATIVE for vision changes or irritation  ENT: NEGATIVE for ear, mouth and throat problems  RESP: NEGATIVE for significant cough or SOB  BREAST: NEGATIVE for masses, tenderness or discharge  CV: NEGATIVE for chest pain, palpitations or peripheral edema  GI: NEGATIVE for nausea, abdominal pain, heartburn, or change in bowel habits  : NEGATIVE for unusual urinary or vaginal symptoms. No vaginal bleeding.  MUSCULOSKELETAL: NEGATIVE for significant arthralgias or myalgia  NEURO: NEGATIVE for weakness, dizziness or paresthesias  PSYCHIATRIC: NEGATIVE for changes in mood or affect      OBJECTIVE:   BP (!) 140/78 (BP Location: Left arm, Patient Position: Sitting, Cuff Size: Adult Regular)   Pulse 66   Temp 97.8  F (36.6  C) (Tympanic)   Resp 18   Wt 83.9 kg (185 lb)   SpO2 99%   BMI 33.84 kg/m    Physical Exam  GENERAL: healthy, alert and no distress  EYES: Eyes grossly normal to inspection, PERRL and conjunctivae and sclerae normal  HENT: ear canals and TM's normal, nose and mouth without ulcers or lesions  NECK: no adenopathy, no asymmetry, masses, or scars and thyroid normal to palpation  RESP: lungs clear to auscultation - no rales, rhonchi or wheezes  BREAST: normal without masses, tenderness or nipple discharge and no palpable axillary masses or adenopathy  CV: regular rate and rhythm, normal S1 S2, no S3 or S4, no murmur, click or rub, no  "peripheral edema and peripheral pulses strong  ABDOMEN: soft, nontender, no hepatosplenomegaly, no masses and bowel sounds normal   (female): normal urethral meatus , vaginal mucosa pink, moist, well rugated and vaginal mucosal atrophy  MS: no gross musculoskeletal defects noted, no edema  SKIN: no suspicious lesions or rashes  NEURO: Normal strength and tone, mentation intact and speech normal  PSYCH: mentation appears normal, affect normal/bright    Diagnostic Test Results:  Labs reviewed in Epic  No results found for any visits on 08/18/21.    ASSESSMENT/PLAN:     Suzy was seen today for physical.    Diagnoses and all orders for this visit:    Routine history and physical examination of adult  See HCM above.     Vaginal atrophy  With painful intercourse even with silicone based lubricants. Trial of intrarosa if covered due to significant pain and burning. Consider estrogen, however, pt reports she has been counseled to avoid estrogen in setting of milroys disease. Suspect topical estrogen would be acceptable with risk/benefit discussion   -     Prasterone 6.5 MG INST; Place 1 capsule vaginally At Bedtime    [place] lymphedema  Stable, relative contra indication for estrogen per patient, see above.       Patient has been advised of split billing requirements and indicates understanding: Yes  COUNSELING:  Reviewed preventive health counseling, as reflected in patient instructions    Estimated body mass index is 33.84 kg/m  as calculated from the following:    Height as of 6/23/21: 1.575 m (5' 2\").    Weight as of this encounter: 83.9 kg (185 lb).    She reports that she has never smoked. She has never used smokeless tobacco.      Counseling Resources:  ATP IV Guidelines  Pooled Cohorts Equation Calculator  Breast Cancer Risk Calculator  BRCA-Related Cancer Risk Assessment: FHS-7 Tool  FRAX Risk Assessment  ICSI Preventive Guidelines  Dietary Guidelines for Americans, 2010  USDA's MyPlate  ASA Prophylaxis  Lung CA " Screening    Maryjane Lyon MD  [facility]

## 2021-08-18 ENCOUNTER — OFFICE VISIT (OUTPATIENT)
Dept: FAMILY MEDICINE | Facility: OTHER | Age: 61
End: 2021-08-18
Attending: FAMILY MEDICINE
Payer: COMMERCIAL

## 2021-08-18 ENCOUNTER — TRANSFERRED RECORDS (OUTPATIENT)
Dept: HEALTH INFORMATION MANAGEMENT | Facility: CLINIC | Age: 61
End: 2021-08-18

## 2021-08-18 VITALS
RESPIRATION RATE: 18 BRPM | BODY MASS INDEX: 33.84 KG/M2 | WEIGHT: 185 LBS | OXYGEN SATURATION: 99 % | SYSTOLIC BLOOD PRESSURE: 118 MMHG | DIASTOLIC BLOOD PRESSURE: 74 MMHG | HEART RATE: 66 BPM | TEMPERATURE: 97.8 F

## 2021-08-18 DIAGNOSIS — N95.2 VAGINAL ATROPHY: ICD-10-CM

## 2021-08-18 DIAGNOSIS — Z00.00 ROUTINE HISTORY AND PHYSICAL EXAMINATION OF ADULT: Primary | ICD-10-CM

## 2021-08-18 DIAGNOSIS — Q82.0 MILROY LYMPHEDEMA: ICD-10-CM

## 2021-08-18 LAB — RETINOPATHY: NEGATIVE

## 2021-08-18 PROCEDURE — 99396 PREV VISIT EST AGE 40-64: CPT | Performed by: FAMILY MEDICINE

## 2021-08-18 NOTE — NURSING NOTE
"Chief Complaint   Patient presents with     Physical       Initial /74 (BP Location: Left arm, Patient Position: Sitting, Cuff Size: Adult Regular)   Pulse 66   Temp 97.8  F (36.6  C) (Tympanic)   Resp 18   Wt 83.9 kg (185 lb)   SpO2 99%   BMI 33.84 kg/m   Estimated body mass index is 33.84 kg/m  as calculated from the following:    Height as of 6/23/21: 1.575 m (5' 2\").    Weight as of this encounter: 83.9 kg (185 lb).  Medication Reconciliation: complete  Meghna Noble LPN  "

## 2021-08-23 ENCOUNTER — ALLIED HEALTH/NURSE VISIT (OUTPATIENT)
Dept: FAMILY MEDICINE | Facility: OTHER | Age: 61
End: 2021-08-23
Attending: FAMILY MEDICINE
Payer: COMMERCIAL

## 2021-08-23 DIAGNOSIS — Z23 NEED FOR VACCINATION: Primary | ICD-10-CM

## 2021-08-23 PROCEDURE — 90750 HZV VACC RECOMBINANT IM: CPT

## 2021-08-23 PROCEDURE — 90471 IMMUNIZATION ADMIN: CPT

## 2021-08-28 DIAGNOSIS — Z79.4 TYPE 2 DIABETES MELLITUS WITHOUT COMPLICATION, WITH LONG-TERM CURRENT USE OF INSULIN (H): ICD-10-CM

## 2021-08-28 DIAGNOSIS — E11.9 TYPE 2 DIABETES MELLITUS WITHOUT COMPLICATION, WITH LONG-TERM CURRENT USE OF INSULIN (H): ICD-10-CM

## 2021-08-31 ENCOUNTER — TELEPHONE (OUTPATIENT)
Dept: FAMILY MEDICINE | Facility: OTHER | Age: 61
End: 2021-08-31

## 2021-08-31 DIAGNOSIS — Z79.4 TYPE 2 DIABETES MELLITUS WITHOUT COMPLICATION, WITH LONG-TERM CURRENT USE OF INSULIN (H): ICD-10-CM

## 2021-08-31 DIAGNOSIS — E11.9 TYPE 2 DIABETES MELLITUS WITHOUT COMPLICATION, WITH LONG-TERM CURRENT USE OF INSULIN (H): ICD-10-CM

## 2021-08-31 NOTE — TELEPHONE ENCOUNTER
Fax from [facility] on alternative request:    Prasterone 6.5 MG INST is not covered by patients insurance.     Please advise.

## 2021-08-31 NOTE — TELEPHONE ENCOUNTER
Please contact patient, can trial optical estrogen cream or estrogen vaginal tablets.     Risk of clots with these meds as topical agents is very small, I think she could likely use these safely with Milry Disease.

## 2021-08-31 NOTE — TELEPHONE ENCOUNTER
Test strips      Last Written Prescription Date:  4/22/2020  Last Fill Quantity: 200,   # refills: 11  Last Office Visit: 8/18/21  Future Office visit:    Next 5 appointments (look out 90 days)    Sep 27, 2021  8:45 AM  (Arrive by 8:30 AM)  [facility] [address]  [facility] [address]  [phone]

## 2021-09-01 ENCOUNTER — MYC MEDICAL ADVICE (OUTPATIENT)
Dept: FAMILY MEDICINE | Facility: OTHER | Age: 61
End: 2021-09-01

## 2021-09-01 DIAGNOSIS — N95.2 VAGINAL ATROPHY: ICD-10-CM

## 2021-09-01 DIAGNOSIS — N95.2 VAGINAL ATROPHY: Primary | ICD-10-CM

## 2021-09-01 RX ORDER — PEN NEEDLE, DIABETIC 31 GX5/16"
NEEDLE, DISPOSABLE MISCELLANEOUS
Qty: 100 EACH | Refills: 3 | Status: SHIPPED | OUTPATIENT
Start: 2021-09-01 | End: 2023-04-11

## 2021-09-01 NOTE — TELEPHONE ENCOUNTER
Prasterone       Last Written Prescription Date:  8/18/2021  Last Fill Quantity: 56,   # refills: 0  Last Office Visit: 8/18/2021  Future Office visit:    Next 5 appointments (look out 90 days)    Sep 27, 2021  8:45 AM  (Arrive by 8:30 AM)  SHORT with Nathanael Mena MD  [facility] ) [address] [facility] [address]  [phone]

## 2021-09-06 ENCOUNTER — MYC MEDICAL ADVICE (OUTPATIENT)
Dept: FAMILY MEDICINE | Facility: OTHER | Age: 61
End: 2021-09-06

## 2021-09-06 DIAGNOSIS — N95.2 VAGINAL ATROPHY: Primary | ICD-10-CM

## 2021-09-07 ENCOUNTER — MYC MEDICAL ADVICE (OUTPATIENT)
Dept: FAMILY MEDICINE | Facility: OTHER | Age: 61
End: 2021-09-07

## 2021-09-07 DIAGNOSIS — Z79.4 TYPE 2 DIABETES MELLITUS WITHOUT COMPLICATION, WITH LONG-TERM CURRENT USE OF INSULIN (H): Primary | ICD-10-CM

## 2021-09-07 DIAGNOSIS — E11.9 TYPE 2 DIABETES MELLITUS WITHOUT COMPLICATION, WITH LONG-TERM CURRENT USE OF INSULIN (H): Primary | ICD-10-CM

## 2021-09-11 ENCOUNTER — HEALTH MAINTENANCE LETTER (OUTPATIENT)
Age: 61
End: 2021-09-11

## 2021-09-12 ENCOUNTER — MYC MEDICAL ADVICE (OUTPATIENT)
Dept: FAMILY MEDICINE | Facility: OTHER | Age: 61
End: 2021-09-12

## 2021-09-12 DIAGNOSIS — E11.9 TYPE 2 DIABETES MELLITUS WITHOUT COMPLICATION, WITH LONG-TERM CURRENT USE OF INSULIN (H): ICD-10-CM

## 2021-09-12 DIAGNOSIS — Z79.4 TYPE 2 DIABETES MELLITUS WITHOUT COMPLICATION, WITH LONG-TERM CURRENT USE OF INSULIN (H): ICD-10-CM

## 2021-09-13 RX ORDER — LANCETS 33 GAUGE
1 EACH MISCELLANEOUS 4 TIMES DAILY
Qty: 200 EACH | Refills: 4 | Status: SHIPPED | OUTPATIENT
Start: 2021-09-13

## 2021-09-15 DIAGNOSIS — Z79.4 TYPE 2 DIABETES MELLITUS WITHOUT COMPLICATION, WITH LONG-TERM CURRENT USE OF INSULIN (H): ICD-10-CM

## 2021-09-15 DIAGNOSIS — E78.2 MIXED HYPERLIPIDEMIA: ICD-10-CM

## 2021-09-15 DIAGNOSIS — E11.9 TYPE 2 DIABETES MELLITUS WITHOUT COMPLICATION, WITH LONG-TERM CURRENT USE OF INSULIN (H): ICD-10-CM

## 2021-09-16 ENCOUNTER — MYC MEDICAL ADVICE (OUTPATIENT)
Dept: FAMILY MEDICINE | Facility: OTHER | Age: 61
End: 2021-09-16

## 2021-09-16 RX ORDER — SIMVASTATIN 40 MG
TABLET ORAL
Qty: 90 TABLET | Refills: 1 | Status: SHIPPED | OUTPATIENT
Start: 2021-09-16 | End: 2022-03-14

## 2021-09-16 RX ORDER — ESTRADIOL 0.1 MG/G
2 CREAM VAGINAL
Qty: 42.5 G | Refills: 11 | Status: SHIPPED | OUTPATIENT
Start: 2021-09-16 | End: 2022-11-22

## 2021-09-16 NOTE — TELEPHONE ENCOUNTER
Please advise? She is still looking for a cream to be sent in? These are covered.     Imvexxy suppositories 10 mcg  Vagifem tab. 10 mcg  Estrodiol vag. cream

## 2021-09-20 ENCOUNTER — MYC MEDICAL ADVICE (OUTPATIENT)
Dept: FAMILY MEDICINE | Facility: OTHER | Age: 61
End: 2021-09-20

## 2021-09-24 NOTE — PROGRESS NOTES
Assessment & Plan     Type 2 diabetes mellitus without complication, with long-term current use of insulin (H)  Stable.  Sugars much improved.  Await labs and follow.  Doing well.    - Basic metabolic panel; Future  - Hemoglobin A1c; Future  - CA FOOT EXAM NO CHARGE  - Albumin Random Urine Quantitative with Creat Ratio; Future  - Basic metabolic panel  - Hemoglobin A1c    Benign essential hypertension  Stable.      Lymphedema  Stable.  No recent cellulitis.  She has the abx at home.                     No follow-ups on file.    Nathanael Mena MD  [facility]   Suzy is a 61 year old who presents for the following health issues     HPI     Diabetes Follow-up    How often are you checking your blood sugar? Four or more times daily  Blood sugar testing frequency justification:  Uncontrolled diabetes  What time of day are you checking your blood sugars (select all that apply)?  Before and after meals  Have you had any blood sugars above 200?  Yes a few  Have you had any blood sugars below 70?  Yes a couple    What symptoms do you notice when your blood sugar is low?  None    What concerns do you have today about your diabetes? None     Do you have any of these symptoms? (Select all that apply)  No numbness or tingling in feet.  No redness, sores or blisters on feet.  No complaints of excessive thirst.  No reports of blurry vision.  No significant changes to weight.      BP Readings from Last 2 Encounters:   09/27/21 122/80   08/18/21 118/74     Hemoglobin A1C (%)   Date Value   06/23/2021 7.8 (H)   09/24/2020 6.4 (H)     LDL Cholesterol Calculated (mg/dL)   Date Value   06/23/2021 74   06/19/2020 73         Hypertension Follow-up      Do you check your blood pressure regularly outside of the clinic? No     Are you following a low salt diet? Yes    Are your blood pressures ever more than 140 on the top number (systolic) OR more   than 90 on the bottom number (diastolic), for  example 140/90? NA    Diabetic foot exam   1. foot deformity   Yes chronic edematous changes all the way to the distal toes.   2. Current or previous foot ulceration     No  3. Current or previous pre-ulcerative calluses     Yes  4. previous partial amputation of one or both feet or complete amputation of one foot     No  5. peripheral neuropathy with evidence of callus formation     No  6. poor circulation     No        Review of Systems   Constitutional, HEENT, cardiovascular, pulmonary, gi and gu systems are negative, except as otherwise noted.      Objective    /80   Pulse 68   Temp 98.2  F (36.8  C)   Wt 82.6 kg (182 lb)   SpO2 96%   BMI 33.29 kg/m    Body mass index is 33.29 kg/m .  Physical Exam   GENERAL: healthy, alert and no distress  NECK: no adenopathy, no asymmetry, masses, or scars and thyroid normal to palpation  RESP: lungs clear to auscultation - no rales, rhonchi or wheezes  CV: regular rate and rhythm, normal S1 S2, no S3 or S4, no murmur, click or rub, no peripheral edema and peripheral pulses strong  ABDOMEN: soft, nontender, no hepatosplenomegaly, no masses and bowel sounds normal  MS: no gross musculoskeletal defects noted, no edema

## 2021-09-27 ENCOUNTER — TELEPHONE (OUTPATIENT)
Dept: FAMILY MEDICINE | Facility: OTHER | Age: 61
End: 2021-09-27

## 2021-09-27 ENCOUNTER — OFFICE VISIT (OUTPATIENT)
Dept: FAMILY MEDICINE | Facility: OTHER | Age: 61
End: 2021-09-27
Attending: FAMILY MEDICINE
Payer: COMMERCIAL

## 2021-09-27 VITALS
BODY MASS INDEX: 33.29 KG/M2 | DIASTOLIC BLOOD PRESSURE: 80 MMHG | OXYGEN SATURATION: 96 % | WEIGHT: 182 LBS | SYSTOLIC BLOOD PRESSURE: 122 MMHG | TEMPERATURE: 98.2 F | HEART RATE: 68 BPM

## 2021-09-27 DIAGNOSIS — Z79.4 TYPE 2 DIABETES MELLITUS WITHOUT COMPLICATION, WITH LONG-TERM CURRENT USE OF INSULIN (H): Primary | ICD-10-CM

## 2021-09-27 DIAGNOSIS — E11.9 TYPE 2 DIABETES MELLITUS WITHOUT COMPLICATION, WITH LONG-TERM CURRENT USE OF INSULIN (H): Primary | ICD-10-CM

## 2021-09-27 DIAGNOSIS — I89.0 LYMPHEDEMA: ICD-10-CM

## 2021-09-27 DIAGNOSIS — E87.6 LOW BLOOD POTASSIUM: Primary | ICD-10-CM

## 2021-09-27 DIAGNOSIS — I10 BENIGN ESSENTIAL HYPERTENSION: ICD-10-CM

## 2021-09-27 LAB
ANION GAP SERPL CALCULATED.3IONS-SCNC: 6 MMOL/L (ref 3–14)
BUN SERPL-MCNC: 8 MG/DL (ref 7–30)
CALCIUM SERPL-MCNC: 9.1 MG/DL (ref 8.5–10.1)
CHLORIDE BLD-SCNC: 107 MMOL/L (ref 94–109)
CO2 SERPL-SCNC: 29 MMOL/L (ref 20–32)
CREAT SERPL-MCNC: 0.73 MG/DL (ref 0.52–1.04)
CREAT UR-MCNC: 269 MG/DL
EST. AVERAGE GLUCOSE BLD GHB EST-MCNC: 143 MG/DL
GFR SERPL CREATININE-BSD FRML MDRD: 90 ML/MIN/1.73M2
GLUCOSE BLD-MCNC: 81 MG/DL (ref 70–99)
HBA1C MFR BLD: 6.6 % (ref 0–5.6)
MICROALBUMIN UR-MCNC: 48 MG/L
MICROALBUMIN/CREAT UR: 17.84 MG/G CR (ref 0–25)
POTASSIUM BLD-SCNC: 3.2 MMOL/L (ref 3.4–5.3)
SODIUM SERPL-SCNC: 142 MMOL/L (ref 133–144)

## 2021-09-27 PROCEDURE — 83036 HEMOGLOBIN GLYCOSYLATED A1C: CPT | Performed by: FAMILY MEDICINE

## 2021-09-27 PROCEDURE — 80048 BASIC METABOLIC PNL TOTAL CA: CPT | Performed by: FAMILY MEDICINE

## 2021-09-27 PROCEDURE — 82043 UR ALBUMIN QUANTITATIVE: CPT | Performed by: FAMILY MEDICINE

## 2021-09-27 PROCEDURE — 99214 OFFICE O/P EST MOD 30 MIN: CPT | Performed by: FAMILY MEDICINE

## 2021-09-27 PROCEDURE — 36415 COLL VENOUS BLD VENIPUNCTURE: CPT | Performed by: FAMILY MEDICINE

## 2021-09-27 RX ORDER — POTASSIUM CHLORIDE 1500 MG/1
20 TABLET, EXTENDED RELEASE ORAL DAILY
Qty: 90 TABLET | Refills: 1 | Status: SHIPPED | OUTPATIENT
Start: 2021-09-27 | End: 2022-06-13

## 2021-09-27 ASSESSMENT — PAIN SCALES - GENERAL: PAINLEVEL: NO PAIN (0)

## 2021-09-27 NOTE — NURSING NOTE
"Chief Complaint   Patient presents with     Diabetes       Initial /80   Pulse 68   Temp 98.2  F (36.8  C)   Wt 82.6 kg (182 lb)   SpO2 96%   BMI 33.29 kg/m   Estimated body mass index is 33.29 kg/m  as calculated from the following:    Height as of 6/23/21: 1.575 m (5' 2\").    Weight as of this encounter: 82.6 kg (182 lb).  Medication Reconciliation: complete  Diane Mccall LPN  "

## 2021-11-08 ENCOUNTER — IMMUNIZATION (OUTPATIENT)
Dept: FAMILY MEDICINE | Facility: OTHER | Age: 61
End: 2021-11-08
Attending: FAMILY MEDICINE
Payer: COMMERCIAL

## 2021-11-08 ENCOUNTER — LAB (OUTPATIENT)
Dept: LAB | Facility: OTHER | Age: 61
End: 2021-11-08
Payer: COMMERCIAL

## 2021-11-08 DIAGNOSIS — E87.6 LOW BLOOD POTASSIUM: ICD-10-CM

## 2021-11-08 LAB
ANION GAP SERPL CALCULATED.3IONS-SCNC: 5 MMOL/L (ref 3–14)
BUN SERPL-MCNC: 12 MG/DL (ref 7–30)
CALCIUM SERPL-MCNC: 9.2 MG/DL (ref 8.5–10.1)
CHLORIDE BLD-SCNC: 109 MMOL/L (ref 94–109)
CO2 SERPL-SCNC: 28 MMOL/L (ref 20–32)
CREAT SERPL-MCNC: 0.71 MG/DL (ref 0.52–1.04)
GFR SERPL CREATININE-BSD FRML MDRD: >90 ML/MIN/1.73M2
GLUCOSE BLD-MCNC: 107 MG/DL (ref 70–99)
POTASSIUM BLD-SCNC: 4 MMOL/L (ref 3.4–5.3)
SODIUM SERPL-SCNC: 142 MMOL/L (ref 133–144)

## 2021-11-08 PROCEDURE — 91300 PR COVID VAC PFIZER DIL RECON 30 MCG/0.3 ML IM: CPT

## 2021-11-08 PROCEDURE — 80048 BASIC METABOLIC PNL TOTAL CA: CPT

## 2021-11-08 PROCEDURE — 36415 COLL VENOUS BLD VENIPUNCTURE: CPT

## 2021-11-08 PROCEDURE — 0004A PR COVID VAC PFIZER DIL RECON 30 MCG/0.3 ML IM: CPT

## 2021-11-15 ENCOUNTER — ALLIED HEALTH/NURSE VISIT (OUTPATIENT)
Dept: FAMILY MEDICINE | Facility: OTHER | Age: 61
End: 2021-11-15
Attending: FAMILY MEDICINE
Payer: COMMERCIAL

## 2021-11-15 DIAGNOSIS — Z23 NEED FOR PROPHYLACTIC VACCINATION AND INOCULATION AGAINST INFLUENZA: Primary | ICD-10-CM

## 2021-11-15 PROCEDURE — 90471 IMMUNIZATION ADMIN: CPT

## 2021-11-15 PROCEDURE — 90682 RIV4 VACC RECOMBINANT DNA IM: CPT

## 2021-12-11 DIAGNOSIS — F34.1 DYSTHYMIA: ICD-10-CM

## 2021-12-13 RX ORDER — VENLAFAXINE HYDROCHLORIDE 150 MG/1
CAPSULE, EXTENDED RELEASE ORAL
Qty: 90 CAPSULE | Refills: 1 | Status: SHIPPED | OUTPATIENT
Start: 2021-12-13 | End: 2022-06-13

## 2021-12-13 NOTE — TELEPHONE ENCOUNTER
EFFEXOR      Last Written Prescription Date:  6-21-21  Last Fill Quantity: 90,   # refills: 1  Last Office Visit: 9-27-21  Future Office visit:    Next 5 appointments (look out 90 days)    Dec 27, 2021  9:45 AM  (Arrive by 9:30 AM)  SHORT with Nathanael Mena MD  [facility] [address]  [facility] [address]  [phone]           Routing refill request to provider for review/approval because:

## 2021-12-18 DIAGNOSIS — E11.9 TYPE 2 DIABETES MELLITUS WITHOUT COMPLICATION, WITH LONG-TERM CURRENT USE OF INSULIN (H): ICD-10-CM

## 2021-12-18 DIAGNOSIS — Z79.4 TYPE 2 DIABETES MELLITUS WITHOUT COMPLICATION, WITH LONG-TERM CURRENT USE OF INSULIN (H): ICD-10-CM

## 2021-12-20 RX ORDER — INSULIN GLARGINE 100 [IU]/ML
INJECTION, SOLUTION SUBCUTANEOUS
Qty: 15 ML | Refills: 3 | Status: SHIPPED | OUTPATIENT
Start: 2021-12-20 | End: 2022-04-20

## 2021-12-29 NOTE — TELEPHONE ENCOUNTER
Called LM to call and schedule DM follow up appt    Rosemary Gilliam    
Effexor  Last Written Prescription Date: 12/26/17  Last Fill Quantity: 180 # of Refills: 0  Last Office Visit: 8/28/17    Elisha Sigala LPN        
Yes

## 2022-01-01 ENCOUNTER — HEALTH MAINTENANCE LETTER (OUTPATIENT)
Age: 62
End: 2022-01-01

## 2022-02-04 NOTE — PROGRESS NOTES
"  Assessment & Plan     Type 2 diabetes mellitus without complication, with long-term current use of insulin (H)  She has not been on top of this.  The a1c will be higher.  Will follow.  She is well aware how to handle it and we will work together.  Expect this one to be higher.  She is well aware.    - Basic metabolic panel; Future  - Hemoglobin A1c; Future  - Basic metabolic panel  - Hemoglobin A1c    Benign essential hypertension  Stable.      Depression, unspecified depression type  Reviewed.  She has some stress at home with a disagreement with son/daughter in law over the holidays.  They are working through it.  I offer support.  She will let me know if she needs something.                 BMI:   Estimated body mass index is 34.75 kg/m  as calculated from the following:    Height as of this encounter: 1.575 m (5' 2\").    Weight as of this encounter: 86.2 kg (190 lb).           No follow-ups on file.    Nathanael Mena MD  [facility]   Suzy is a 61 year old who presents for the following health issues     HPI     Diabetes Follow-up    How often are you checking your blood sugar? One time daily  What time of day are you checking your blood sugars (select all that apply)?  Before meals  Have you had any blood sugars above 200?  Yes   Have you had any blood sugars below 70?  No    What symptoms do you notice when your blood sugar is low?  Shaky and Weak    What concerns do you have today about your diabetes? Blood sugar is often over 200     Do you have any of these symptoms? (Select all that apply)  No numbness or tingling in feet.  No redness, sores or blisters on feet.  No complaints of excessive thirst.  No reports of blurry vision.  No significant changes to weight.      BP Readings from Last 2 Encounters:   02/07/22 120/78   09/27/21 122/80     Hemoglobin A1C POCT (%)   Date Value   06/23/2021 7.8 (H)   09/24/2020 6.4 (H)     Hemoglobin A1C (%)   Date Value " "  09/27/2021 6.6 (H)     LDL Cholesterol Calculated (mg/dL)   Date Value   06/23/2021 74   06/19/2020 73               Hypertension Follow-up      Do you check your blood pressure regularly outside of the clinic? No     Are you following a low salt diet? Yes    Are your blood pressures ever more than 140 on the top number (systolic) OR more   than 90 on the bottom number (diastolic), for example 140/90? NA          Review of Systems   Constitutional, HEENT, cardiovascular, pulmonary, gi and gu systems are negative, except as otherwise noted.      Objective    /78   Pulse 66   Temp 98.1  F (36.7  C)   Ht 1.575 m (5' 2\")   Wt 86.2 kg (190 lb)   SpO2 97%   BMI 34.75 kg/m    Body mass index is 34.75 kg/m .  Physical Exam   GENERAL: healthy, alert and no distress  NECK: no adenopathy, no asymmetry, masses, or scars and thyroid normal to palpation  RESP: lungs clear to auscultation - no rales, rhonchi or wheezes  CV: regular rate and rhythm, normal S1 S2, no S3 or S4, no murmur, click or rub, no peripheral edema and peripheral pulses strong  ABDOMEN: soft, nontender, no hepatosplenomegaly, no masses and bowel sounds normal  MS: no gross musculoskeletal defects noted, no edema    Labs pending.             "

## 2022-02-07 ENCOUNTER — OFFICE VISIT (OUTPATIENT)
Dept: FAMILY MEDICINE | Facility: OTHER | Age: 62
End: 2022-02-07
Attending: FAMILY MEDICINE
Payer: COMMERCIAL

## 2022-02-07 VITALS
WEIGHT: 190 LBS | HEART RATE: 66 BPM | OXYGEN SATURATION: 97 % | TEMPERATURE: 98.1 F | SYSTOLIC BLOOD PRESSURE: 120 MMHG | BODY MASS INDEX: 34.96 KG/M2 | HEIGHT: 62 IN | DIASTOLIC BLOOD PRESSURE: 78 MMHG

## 2022-02-07 DIAGNOSIS — F32.A DEPRESSION, UNSPECIFIED DEPRESSION TYPE: ICD-10-CM

## 2022-02-07 DIAGNOSIS — I10 BENIGN ESSENTIAL HYPERTENSION: ICD-10-CM

## 2022-02-07 DIAGNOSIS — Z79.4 TYPE 2 DIABETES MELLITUS WITHOUT COMPLICATION, WITH LONG-TERM CURRENT USE OF INSULIN (H): Primary | ICD-10-CM

## 2022-02-07 DIAGNOSIS — E11.9 TYPE 2 DIABETES MELLITUS WITHOUT COMPLICATION, WITH LONG-TERM CURRENT USE OF INSULIN (H): Primary | ICD-10-CM

## 2022-02-07 LAB
ANION GAP SERPL CALCULATED.3IONS-SCNC: 8 MMOL/L (ref 3–14)
BUN SERPL-MCNC: 14 MG/DL (ref 7–30)
CALCIUM SERPL-MCNC: 9.8 MG/DL (ref 8.5–10.1)
CHLORIDE BLD-SCNC: 104 MMOL/L (ref 94–109)
CO2 SERPL-SCNC: 27 MMOL/L (ref 20–32)
CREAT SERPL-MCNC: 0.75 MG/DL (ref 0.52–1.04)
EST. AVERAGE GLUCOSE BLD GHB EST-MCNC: 157 MG/DL
GFR SERPL CREATININE-BSD FRML MDRD: >90 ML/MIN/1.73M2
GLUCOSE BLD-MCNC: 76 MG/DL (ref 70–99)
HBA1C MFR BLD: 7.1 % (ref 0–5.6)
POTASSIUM BLD-SCNC: 3.8 MMOL/L (ref 3.4–5.3)
SODIUM SERPL-SCNC: 139 MMOL/L (ref 133–144)

## 2022-02-07 PROCEDURE — 80048 BASIC METABOLIC PNL TOTAL CA: CPT | Performed by: FAMILY MEDICINE

## 2022-02-07 PROCEDURE — 99214 OFFICE O/P EST MOD 30 MIN: CPT | Performed by: FAMILY MEDICINE

## 2022-02-07 PROCEDURE — 36415 COLL VENOUS BLD VENIPUNCTURE: CPT | Performed by: FAMILY MEDICINE

## 2022-02-07 PROCEDURE — 83036 HEMOGLOBIN GLYCOSYLATED A1C: CPT | Performed by: FAMILY MEDICINE

## 2022-02-07 ASSESSMENT — PAIN SCALES - GENERAL: PAINLEVEL: NO PAIN (0)

## 2022-02-07 ASSESSMENT — MIFFLIN-ST. JEOR: SCORE: 1390.08

## 2022-02-07 NOTE — NURSING NOTE
"Chief Complaint   Patient presents with     Diabetes       Initial /78   Pulse 66   Temp 98.1  F (36.7  C)   Ht 1.575 m (5' 2\")   Wt 86.2 kg (190 lb)   SpO2 97%   BMI 34.75 kg/m   Estimated body mass index is 34.75 kg/m  as calculated from the following:    Height as of this encounter: 1.575 m (5' 2\").    Weight as of this encounter: 86.2 kg (190 lb).  Medication Reconciliation: complete  Diane Mccall LPN  "

## 2022-02-07 NOTE — PATIENT INSTRUCTIONS
"  Patient Education   Diabetes ABCs  Work with Your Health Care Team to Manage Diabetes!     A1c (hemoglobin A1c test):  Check at least every 6 months.  Goal without diabetes: Less than 6%  Goal with diabetes: Less than 7%, but your doctor may have a different goal for you.  My Goal: ___________________   BG (blood glucose):  Goals without diabetes:  Before meals: 60 to 99 mg/dL  After meals (2 hours): under 140 mg/dL  Goals with diabetes:   Before meals: 80 to 130 mg/dL  After meals: (2 hours): under 180 mg/dL  My Goals:  Before meals: __________  After meals: ___________   Blood pressure:  Check at every doctor visit.   Goal: Less than 140/90    Cholesterol:   Check at least 1 time a year.  Goals for LDL (\"bad\" cholesterol):  With heart disease: Less than 70 mg/dL  Without heart disease: Less than 100 mg/dL   Eyes:   Have a dilated eye exam every year.   Teeth:   See your dentist twice a year. People with diabetes have a higher risk of gum disease.  Smoking:   If you smoke, it's important to quit. Make a plan with help from your health care team.  Weight:   Work towards a healthy weight with help from your diabetes educator or dietitian.  Kidneys:     Check your urine protein 1 time each year.    Protect your kidneys by keeping your blood pressure normal.  Feet:    Check your feet every day for signs of injury, dry skin, cracks, cuts, swelling, or blisters.    Ask your doctor to check your feet at every visit.    Wear shoes and socks that fit well.    Don't go barefoot.  Sex:   Talk about erectile dysfunction (ED) and female sexual dysfunction (FSD) with your doctor.  For informational purposes only. Not to replace the advice of your health care provider. Copyright   2018 [employer]. All rights reserved. Illustration ID [id number]   [id number] [facility]. Clinically reviewed by [employer]. SMARTworks 409366 - Rev 07/21.       "

## 2022-03-13 DIAGNOSIS — E78.2 MIXED HYPERLIPIDEMIA: ICD-10-CM

## 2022-03-14 RX ORDER — SIMVASTATIN 40 MG
40 TABLET ORAL AT BEDTIME
Qty: 90 TABLET | Refills: 1 | Status: SHIPPED | OUTPATIENT
Start: 2022-03-14 | End: 2022-09-12

## 2022-04-14 DIAGNOSIS — E11.9 TYPE 2 DIABETES MELLITUS WITHOUT COMPLICATION, WITH LONG-TERM CURRENT USE OF INSULIN (H): ICD-10-CM

## 2022-04-14 DIAGNOSIS — Z79.4 TYPE 2 DIABETES MELLITUS WITHOUT COMPLICATION, WITH LONG-TERM CURRENT USE OF INSULIN (H): ICD-10-CM

## 2022-04-14 RX ORDER — BLOOD SUGAR DIAGNOSTIC
STRIP MISCELLANEOUS
Qty: 200 STRIP | Refills: 3 | Status: SHIPPED | OUTPATIENT
Start: 2022-04-14 | End: 2024-05-23

## 2022-04-19 DIAGNOSIS — Z79.4 TYPE 2 DIABETES MELLITUS WITHOUT COMPLICATION, WITH LONG-TERM CURRENT USE OF INSULIN (H): ICD-10-CM

## 2022-04-19 DIAGNOSIS — E11.9 TYPE 2 DIABETES MELLITUS WITHOUT COMPLICATION, WITH LONG-TERM CURRENT USE OF INSULIN (H): ICD-10-CM

## 2022-04-20 RX ORDER — INSULIN GLARGINE 100 [IU]/ML
INJECTION, SOLUTION SUBCUTANEOUS
Qty: 15 ML | Refills: 3 | Status: SHIPPED | OUTPATIENT
Start: 2022-04-20 | End: 2022-08-15

## 2022-05-26 NOTE — PROGRESS NOTES
"  Assessment & Plan     Type 2 diabetes mellitus without complication, with long-term current use of insulin (H)  Stable.  Expecting an increase with less focus on diet.  Will follow pending the labs.    - Comprehensive metabolic panel; Future  - Lipid Profile; Future  - TSH with free T4 reflex; Future  - Hemoglobin A1c; Future    Ong disease  Stable.  abx on hand.  Reviewed edema, etc.  She uses the pump as well.    - sulfamethoxazole-trimethoprim (BACTRIM DS) 800-160 MG tablet; Take 1 tablet by mouth 2 times daily  - cephALEXin (KEFLEX) 500 MG capsule; Take 1 capsule (500 mg) by mouth 3 times daily    Chronic edema  As above.    - sulfamethoxazole-trimethoprim (BACTRIM DS) 800-160 MG tablet; Take 1 tablet by mouth 2 times daily  - cephALEXin (KEFLEX) 500 MG capsule; Take 1 capsule (500 mg) by mouth 3 times daily    Depression, unspecified depression type  Discussed at some length.  There is still tension within the family, with son living away from home with less contact stressing both Suzy and Nathanael out.  Suggest add 75 more of effexor, for 225 mg total.  She tolerated this higher dose int he past.  Follow pending how things go over time.          30 minutes spent on the date of the encounter doing chart review, patient visit and documentation        BMI:   Estimated body mass index is 35.3 kg/m  as calculated from the following:    Height as of 2/7/22: 1.575 m (5' 2\").    Weight as of this encounter: 87.5 kg (193 lb).           No follow-ups on file.    Nathanael Mena MD  [facility]   Suzy is a 61 year old who presents for the following health issues     HPI     Diabetes Follow-up    How often are you checking your blood sugar? Three times daily  Blood sugar testing frequency justification:  Uncontrolled diabetes  What time of day are you checking your blood sugars (select all that apply)?  Before and after meals  Have you had any blood sugars above 200?  Yes   Have " you had any blood sugars below 70?  Yes     What symptoms do you notice when your blood sugar is low?  Shaky and Weak    What concerns do you have today about your diabetes? Blood sugar is often over 200 and Low blood sugar     Do you have any of these symptoms? (Select all that apply)  No numbness or tingling in feet.  No redness, sores or blisters on feet.  No complaints of excessive thirst.  No reports of blurry vision.  No significant changes to weight.              Hyperlipidemia Follow-Up      Are you regularly taking any medication or supplement to lower your cholesterol?   Yes- simvastatin    Are you having muscle aches or other side effects that you think could be caused by your cholesterol lowering medication?  No    Hypertension Follow-up      Do you check your blood pressure regularly outside of the clinic? No     Are you following a low salt diet? Yes    Are your blood pressures ever more than 140 on the top number (systolic) OR more   than 90 on the bottom number (diastolic), for example 140/90? NA    BP Readings from Last 2 Encounters:   06/13/22 136/74   02/07/22 120/78     Hemoglobin A1C POCT (%)   Date Value   06/23/2021 7.8 (H)   09/24/2020 6.4 (H)     Hemoglobin A1C (%)   Date Value   02/07/2022 7.1 (H)   09/27/2021 6.6 (H)     LDL Cholesterol Calculated (mg/dL)   Date Value   06/23/2021 74   06/19/2020 73           Review of Systems   Constitutional, HEENT, cardiovascular, pulmonary, gi and gu systems are negative, except as otherwise noted.      Objective    /74   Pulse 64   Temp 97.4  F (36.3  C)   Wt 87.5 kg (193 lb)   SpO2 96%   BMI 35.30 kg/m    Body mass index is 35.3 kg/m .  Physical Exam   GENERAL: healthy, alert and no distress  NECK: no adenopathy, no asymmetry, masses, or scars and thyroid normal to palpation  RESP: lungs clear to auscultation - no rales, rhonchi or wheezes  CV: regular rate and rhythm, normal S1 S2, no S3 or S4, no murmur, click or rub, no peripheral edema  and peripheral pulses strong  ABDOMEN: soft, nontender, no hepatosplenomegaly, no masses and bowel sounds normal  MS: no gross musculoskeletal defects noted, no edema    Labs pending.

## 2022-06-11 DIAGNOSIS — F34.1 DYSTHYMIA: ICD-10-CM

## 2022-06-12 ASSESSMENT — PATIENT HEALTH QUESTIONNAIRE - PHQ9
SUM OF ALL RESPONSES TO PHQ QUESTIONS 1-9: 6
SUM OF ALL RESPONSES TO PHQ QUESTIONS 1-9: 6
10. IF YOU CHECKED OFF ANY PROBLEMS, HOW DIFFICULT HAVE THESE PROBLEMS MADE IT FOR YOU TO DO YOUR WORK, TAKE CARE OF THINGS AT HOME, OR GET ALONG WITH OTHER PEOPLE: SOMEWHAT DIFFICULT

## 2022-06-13 ENCOUNTER — IMMUNIZATION (OUTPATIENT)
Dept: FAMILY MEDICINE | Facility: OTHER | Age: 62
End: 2022-06-13
Attending: FAMILY MEDICINE
Payer: COMMERCIAL

## 2022-06-13 VITALS
BODY MASS INDEX: 35.3 KG/M2 | HEART RATE: 64 BPM | OXYGEN SATURATION: 96 % | TEMPERATURE: 97.4 F | DIASTOLIC BLOOD PRESSURE: 74 MMHG | WEIGHT: 193 LBS | SYSTOLIC BLOOD PRESSURE: 136 MMHG

## 2022-06-13 DIAGNOSIS — E11.9 TYPE 2 DIABETES MELLITUS WITHOUT COMPLICATION, WITH LONG-TERM CURRENT USE OF INSULIN (H): Primary | ICD-10-CM

## 2022-06-13 DIAGNOSIS — R60.9 CHRONIC EDEMA: ICD-10-CM

## 2022-06-13 DIAGNOSIS — Q82.0: ICD-10-CM

## 2022-06-13 DIAGNOSIS — F32.A DEPRESSION, UNSPECIFIED DEPRESSION TYPE: ICD-10-CM

## 2022-06-13 DIAGNOSIS — Z79.4 TYPE 2 DIABETES MELLITUS WITHOUT COMPLICATION, WITH LONG-TERM CURRENT USE OF INSULIN (H): Primary | ICD-10-CM

## 2022-06-13 DIAGNOSIS — F34.1 DYSTHYMIA: ICD-10-CM

## 2022-06-13 LAB
ALBUMIN SERPL-MCNC: 4 G/DL (ref 3.4–5)
ALP SERPL-CCNC: 87 U/L (ref 40–150)
ALT SERPL W P-5'-P-CCNC: 38 U/L (ref 0–50)
ANION GAP SERPL CALCULATED.3IONS-SCNC: 8 MMOL/L (ref 3–14)
AST SERPL W P-5'-P-CCNC: 40 U/L (ref 0–45)
BILIRUB SERPL-MCNC: 0.5 MG/DL (ref 0.2–1.3)
BUN SERPL-MCNC: 12 MG/DL (ref 7–30)
CALCIUM SERPL-MCNC: 9.1 MG/DL (ref 8.5–10.1)
CHLORIDE BLD-SCNC: 105 MMOL/L (ref 94–109)
CHOLEST SERPL-MCNC: 144 MG/DL
CO2 SERPL-SCNC: 28 MMOL/L (ref 20–32)
CREAT SERPL-MCNC: 0.66 MG/DL (ref 0.52–1.04)
EST. AVERAGE GLUCOSE BLD GHB EST-MCNC: 177 MG/DL
FASTING STATUS PATIENT QL REPORTED: YES
GFR SERPL CREATININE-BSD FRML MDRD: >90 ML/MIN/1.73M2
GLUCOSE BLD-MCNC: 79 MG/DL (ref 70–99)
HBA1C MFR BLD: 7.8 % (ref 0–5.6)
HDLC SERPL-MCNC: 37 MG/DL
LDLC SERPL CALC-MCNC: 63 MG/DL
NONHDLC SERPL-MCNC: 107 MG/DL
POTASSIUM BLD-SCNC: 3.9 MMOL/L (ref 3.4–5.3)
PROT SERPL-MCNC: 7.8 G/DL (ref 6.8–8.8)
SODIUM SERPL-SCNC: 141 MMOL/L (ref 133–144)
TRIGL SERPL-MCNC: 219 MG/DL
TSH SERPL DL<=0.005 MIU/L-ACNC: 3.22 MU/L (ref 0.4–4)

## 2022-06-13 PROCEDURE — 99214 OFFICE O/P EST MOD 30 MIN: CPT | Performed by: FAMILY MEDICINE

## 2022-06-13 PROCEDURE — 36415 COLL VENOUS BLD VENIPUNCTURE: CPT | Performed by: FAMILY MEDICINE

## 2022-06-13 PROCEDURE — 80061 LIPID PANEL: CPT | Performed by: FAMILY MEDICINE

## 2022-06-13 PROCEDURE — 80053 COMPREHEN METABOLIC PANEL: CPT | Performed by: FAMILY MEDICINE

## 2022-06-13 PROCEDURE — 84443 ASSAY THYROID STIM HORMONE: CPT | Performed by: FAMILY MEDICINE

## 2022-06-13 PROCEDURE — 91305 COVID-19,PF,PFIZER (12+ YRS): CPT

## 2022-06-13 PROCEDURE — 83036 HEMOGLOBIN GLYCOSYLATED A1C: CPT | Performed by: FAMILY MEDICINE

## 2022-06-13 PROCEDURE — 0054A COVID-19,PF,PFIZER (12+ YRS): CPT

## 2022-06-13 RX ORDER — SULFAMETHOXAZOLE/TRIMETHOPRIM 800-160 MG
1 TABLET ORAL 2 TIMES DAILY
Qty: 14 TABLET | Refills: 0 | Status: SHIPPED | OUTPATIENT
Start: 2022-06-13 | End: 2022-08-19

## 2022-06-13 RX ORDER — VENLAFAXINE HYDROCHLORIDE 75 MG/1
225 CAPSULE, EXTENDED RELEASE ORAL DAILY
Qty: 270 CAPSULE | Refills: 3 | Status: SHIPPED | OUTPATIENT
Start: 2022-06-13 | End: 2023-02-01

## 2022-06-13 RX ORDER — CEPHALEXIN 500 MG/1
500 CAPSULE ORAL 3 TIMES DAILY
Qty: 21 CAPSULE | Refills: 0 | Status: SHIPPED | OUTPATIENT
Start: 2022-06-13 | End: 2022-08-19

## 2022-06-13 RX ORDER — VENLAFAXINE HYDROCHLORIDE 150 MG/1
CAPSULE, EXTENDED RELEASE ORAL
Qty: 90 CAPSULE | Refills: 3 | Status: SHIPPED | OUTPATIENT
Start: 2022-06-13 | End: 2022-06-13

## 2022-06-13 ASSESSMENT — PAIN SCALES - GENERAL: PAINLEVEL: MODERATE PAIN (4)

## 2022-06-13 NOTE — NURSING NOTE
"Chief Complaint   Patient presents with     Diabetes       Initial /74   Pulse 64   Temp 97.4  F (36.3  C)   Wt 87.5 kg (193 lb)   SpO2 96%   BMI 35.30 kg/m   Estimated body mass index is 35.3 kg/m  as calculated from the following:    Height as of 2/7/22: 1.575 m (5' 2\").    Weight as of this encounter: 87.5 kg (193 lb).  Medication Reconciliation: complete  Diane Mccall LPN  "

## 2022-07-01 DIAGNOSIS — Z12.31 VISIT FOR SCREENING MAMMOGRAM: Primary | ICD-10-CM

## 2022-08-01 ENCOUNTER — ANCILLARY PROCEDURE (OUTPATIENT)
Dept: MAMMOGRAPHY | Facility: OTHER | Age: 62
End: 2022-08-01
Attending: FAMILY MEDICINE
Payer: COMMERCIAL

## 2022-08-01 ENCOUNTER — TELEPHONE (OUTPATIENT)
Dept: MAMMOGRAPHY | Facility: OTHER | Age: 62
End: 2022-08-01

## 2022-08-01 DIAGNOSIS — Z12.31 VISIT FOR SCREENING MAMMOGRAM: ICD-10-CM

## 2022-08-01 PROCEDURE — 77063 BREAST TOMOSYNTHESIS BI: CPT | Mod: TC | Performed by: RADIOLOGY

## 2022-08-01 PROCEDURE — 77067 SCR MAMMO BI INCL CAD: CPT | Mod: TC | Performed by: RADIOLOGY

## 2022-08-17 ASSESSMENT — ENCOUNTER SYMPTOMS
COUGH: 0
MYALGIAS: 0
DIARRHEA: 0
ABDOMINAL PAIN: 0
HEMATURIA: 0
NAUSEA: 0
CONSTIPATION: 0
DIZZINESS: 0
JOINT SWELLING: 0
CHILLS: 0
WEAKNESS: 0
HEARTBURN: 0
HEMATOCHEZIA: 0
NERVOUS/ANXIOUS: 0
PALPITATIONS: 0
EYE PAIN: 0
HEADACHES: 0
ARTHRALGIAS: 0
FREQUENCY: 0
BREAST MASS: 0
PARESTHESIAS: 0
SORE THROAT: 0
SHORTNESS OF BREATH: 0
FEVER: 0
DYSURIA: 0

## 2022-08-19 ENCOUNTER — OFFICE VISIT (OUTPATIENT)
Dept: FAMILY MEDICINE | Facility: OTHER | Age: 62
End: 2022-08-19
Attending: FAMILY MEDICINE
Payer: COMMERCIAL

## 2022-08-19 VITALS
DIASTOLIC BLOOD PRESSURE: 66 MMHG | RESPIRATION RATE: 16 BRPM | HEART RATE: 62 BPM | OXYGEN SATURATION: 98 % | WEIGHT: 186 LBS | BODY MASS INDEX: 34.23 KG/M2 | SYSTOLIC BLOOD PRESSURE: 120 MMHG | TEMPERATURE: 98.3 F | HEIGHT: 62 IN

## 2022-08-19 DIAGNOSIS — R53.82 CHRONIC FATIGUE: ICD-10-CM

## 2022-08-19 DIAGNOSIS — E11.65 TYPE 2 DIABETES MELLITUS WITH HYPERGLYCEMIA, WITH LONG-TERM CURRENT USE OF INSULIN (H): ICD-10-CM

## 2022-08-19 DIAGNOSIS — E66.01 MORBID OBESITY (H): ICD-10-CM

## 2022-08-19 DIAGNOSIS — Z79.4 TYPE 2 DIABETES MELLITUS WITH HYPERGLYCEMIA, WITH LONG-TERM CURRENT USE OF INSULIN (H): ICD-10-CM

## 2022-08-19 DIAGNOSIS — E11.9 TYPE 2 DIABETES MELLITUS WITHOUT COMPLICATION, WITH LONG-TERM CURRENT USE OF INSULIN (H): ICD-10-CM

## 2022-08-19 DIAGNOSIS — Z00.00 ROUTINE GENERAL MEDICAL EXAMINATION AT A HEALTH CARE FACILITY: Primary | ICD-10-CM

## 2022-08-19 DIAGNOSIS — Z79.4 TYPE 2 DIABETES MELLITUS WITHOUT COMPLICATION, WITH LONG-TERM CURRENT USE OF INSULIN (H): ICD-10-CM

## 2022-08-19 LAB — VIT B12 SERPL-MCNC: 295 PG/ML (ref 232–1245)

## 2022-08-19 PROCEDURE — 36415 COLL VENOUS BLD VENIPUNCTURE: CPT | Performed by: FAMILY MEDICINE

## 2022-08-19 PROCEDURE — 82607 VITAMIN B-12: CPT | Performed by: FAMILY MEDICINE

## 2022-08-19 PROCEDURE — 82306 VITAMIN D 25 HYDROXY: CPT | Performed by: FAMILY MEDICINE

## 2022-08-19 PROCEDURE — 99396 PREV VISIT EST AGE 40-64: CPT | Performed by: FAMILY MEDICINE

## 2022-08-19 ASSESSMENT — ANXIETY QUESTIONNAIRES
7. FEELING AFRAID AS IF SOMETHING AWFUL MIGHT HAPPEN: NOT AT ALL
5. BEING SO RESTLESS THAT IT IS HARD TO SIT STILL: NOT AT ALL
3. WORRYING TOO MUCH ABOUT DIFFERENT THINGS: NOT AT ALL
4. TROUBLE RELAXING: NOT AT ALL
1. FEELING NERVOUS, ANXIOUS, OR ON EDGE: NOT AT ALL
6. BECOMING EASILY ANNOYED OR IRRITABLE: NOT AT ALL
GAD7 TOTAL SCORE: 0
GAD7 TOTAL SCORE: 0
2. NOT BEING ABLE TO STOP OR CONTROL WORRYING: NOT AT ALL

## 2022-08-19 ASSESSMENT — PATIENT HEALTH QUESTIONNAIRE - PHQ9: SUM OF ALL RESPONSES TO PHQ QUESTIONS 1-9: 0

## 2022-08-19 ASSESSMENT — PAIN SCALES - GENERAL: PAINLEVEL: NO PAIN (0)

## 2022-08-19 NOTE — PROGRESS NOTES
SUBJECTIVE:   CC: Suzy Gallardo is an 62 year old woman who presents for preventive health visit.       Patient has been advised of split billing requirements and indicates understanding: Yes     Healthy Habits:     Getting at least 3 servings of Calcium per day:  Yes    Bi-annual eye exam:  Yes    Dental care twice a year:  Yes    Sleep apnea or symptoms of sleep apnea:  None    Diet:  Low salt    Frequency of exercise:  2-3 days/week    Duration of exercise:  15-30 minutes    Taking medications regularly:  Yes    Medication side effects:  None    PHQ-2 Total Score: 2    Additional concerns today:  Yes    Today's PHQ-2 Score:   PHQ-2 ( 1999 Pfizer) 8/17/2022   Q1: Little interest or pleasure in doing things 1   Q2: Feeling down, depressed or hopeless 1   PHQ-2 Score 2   PHQ-2 Total Score (12-17 Years)- Positive if 3 or more points; Administer PHQ-A if positive -   Q1: Little interest or pleasure in doing things Several days   Q2: Feeling down, depressed or hopeless Several days   PHQ-2 Score 2     Abuse: Current or Past (Physical, Sexual or Emotional) - No  Do you feel safe in your environment? Yes    Social History     Tobacco Use     Smoking status: Never Smoker     Smokeless tobacco: Never Used   Substance Use Topics     Alcohol use: No     If you drink alcohol do you typically have >3 drinks per day or >7 drinks per week? No    No flowsheet data found.    Reviewed orders with patient.  Reviewed health maintenance and updated orders accordingly - Yes    Cancer Screenings:  Colon - 2013 colonoscopy with 10 year follow up.   Cervical - LEIF 2009.   Breast - 8/2022  Lung - Never smoker.  Prostate - NA  Skin - No lesions of concerns. Would like back moles reviewed.      Immunizations:  Tdap - 2020  Zoster - Singrix x 2  Influenza - out of season  Prevnar - NA  Pneumovax - 2010  COVID 19: Completed pfizer. with two boosters.      Cardiovascular:  Fasting glucose/Hgb A1C: 7.8 (6/21) has plan in place with PCP.  "  Cholesterol: ldl 74 6/21, on statin.   ASCVD score:      Other:  Osteoporosis - Normal bone density in 2019.   --Vitamin D - 33 2019    Breast Cancer Screening:    Breast CA Risk Assessment (FHS-7) 8/15/2021   Do you have a family history of breast, colon, or ovarian cancer? No / Unknown       Pertinent mammograms are reviewed under the imaging tab.    History of abnormal Pap smear: Status post benign hysterectomy. Health Maintenance and Surgical History updated.     Reviewed and updated as needed this visit by clinical staff   Tobacco  Allergies  Meds  Problems  Med Hx  Surg Hx  Fam Hx            Reviewed and updated as needed this visit by Provider   Tobacco    Problems  Med Hx  Surg Hx  Fam Hx               Review of Systems  CONSTITUTIONAL: NEGATIVE for fever, chills, change in weight  INTEGUMENTARY/SKIN: NEGATIVE for worrisome rashes, moles or lesions  EYES: NEGATIVE for vision changes or irritation  ENT: NEGATIVE for ear, mouth and throat problems  RESP: NEGATIVE for significant cough or SOB  BREAST: NEGATIVE for masses, tenderness or discharge  CV: NEGATIVE for chest pain, palpitations or peripheral edema  GI: NEGATIVE for nausea, abdominal pain, heartburn, or change in bowel habits  : NEGATIVE for unusual urinary or vaginal symptoms. No vaginal bleeding.  MUSCULOSKELETAL: NEGATIVE for significant arthralgias or myalgia  NEURO: NEGATIVE for weakness, dizziness or paresthesias  PSYCHIATRIC: NEGATIVE for changes in mood or affect      OBJECTIVE:   /66 (BP Location: Left arm, Patient Position: Sitting, Cuff Size: Adult Regular)   Pulse 62   Temp 98.3  F (36.8  C) (Tympanic)   Resp 16   Ht 1.575 m (5' 2\")   Wt 84.4 kg (186 lb)   SpO2 98%   BMI 34.02 kg/m    Physical Exam  GENERAL APPEARANCE: healthy, alert and no distress  EYES: Eyes grossly normal to inspection, PERRL and conjunctivae and sclerae normal  HENT: ear canals and TM's normal, nose and mouth without ulcers or lesions, " "oropharynx clear and oral mucous membranes moist  NECK: no adenopathy, no asymmetry, masses, or scars and thyroid normal to palpation  RESP: lungs clear to auscultation - no rales, rhonchi or wheezes  BREAST: normal without masses, tenderness or nipple discharge and no palpable axillary masses or adenopathy  CV: regular rate and rhythm, normal S1 S2, no S3 or S4, no murmur, click or rub, no peripheral edema and peripheral pulses strong  ABDOMEN: soft, nontender, no hepatosplenomegaly, no masses and bowel sounds normal  MS: no musculoskeletal defects are noted and gait is age appropriate without ataxia  SKIN: no suspicious lesions or rashes  NEURO: Normal strength and tone, sensory exam grossly normal, mentation intact and speech normal  PSYCH: mentation appears normal and affect normal/bright    Diagnostic Test Results:  Labs reviewed in Epic  No results found for any visits on 08/19/22.    ASSESSMENT/PLAN:   Suzy was seen today for physical.    Diagnoses and all orders for this visit:    Routine general medical examination at a health care facility  See HCM above    Type 2 diabetes mellitus without complication, with long-term current use of insulin (H)  Uncontrolled, working with PCP on this. Notes fatigue, will check b12 levels due to chronic metformin use. Defer increase in ozempic doing to PCP.   -     Vitamin B12; Future    Chronic fatigue  Will get vitamin levels as below. PCP has increased effexor. Consider augmentation with low dose levothyroxine, if partial improvment    -     Vitamin D Deficiency  -     Vitamin B12    Morbid obesity (H)  Consider increase in ozempic. Work on diet and exercise.     Patient has been advised of split billing requirements and indicates understanding: Yes    COUNSELING:  Reviewed preventive health counseling, as reflected in patient instructions    Estimated body mass index is 34.02 kg/m  as calculated from the following:    Height as of this encounter: 1.575 m (5' 2\").    " Weight as of this encounter: 84.4 kg (186 lb).    Weight management plan: Discussed healthy diet and exercise guidelines    She reports that she has never smoked. She has never used smokeless tobacco.    Counseling Resources:  ATP IV Guidelines  Pooled Cohorts Equation Calculator  Breast Cancer Risk Calculator  BRCA-Related Cancer Risk Assessment: FHS-7 Tool  FRAX Risk Assessment  ICSI Preventive Guidelines  Dietary Guidelines for Americans, 2010  USDA's MyPlate  ASA Prophylaxis  Lung CA Screening    Maryjane Lyon MD  [facility]

## 2022-08-19 NOTE — NURSING NOTE
"Chief Complaint   Patient presents with     Physical       Initial /66 (BP Location: Left arm, Patient Position: Sitting, Cuff Size: Adult Regular)   Pulse 62   Temp 98.3  F (36.8  C) (Tympanic)   Resp 16   Ht 1.575 m (5' 2\")   Wt 84.4 kg (186 lb)   SpO2 98%   BMI 34.02 kg/m   Estimated body mass index is 34.02 kg/m  as calculated from the following:    Height as of this encounter: 1.575 m (5' 2\").    Weight as of this encounter: 84.4 kg (186 lb).  Medication Reconciliation: complete  Brandie Lee LPN  "

## 2022-08-20 LAB — DEPRECATED CALCIDIOL+CALCIFEROL SERPL-MC: 27 UG/L (ref 20–75)

## 2022-08-31 ENCOUNTER — TRANSFERRED RECORDS (OUTPATIENT)
Dept: HEALTH INFORMATION MANAGEMENT | Facility: CLINIC | Age: 62
End: 2022-08-31

## 2022-08-31 LAB
RETINOPATHY: NEGATIVE
RETINOPATHY: NEGATIVE

## 2022-09-08 DIAGNOSIS — E11.9 TYPE 2 DIABETES MELLITUS WITHOUT COMPLICATION, WITH LONG-TERM CURRENT USE OF INSULIN (H): ICD-10-CM

## 2022-09-08 DIAGNOSIS — Z79.4 TYPE 2 DIABETES MELLITUS WITHOUT COMPLICATION, WITH LONG-TERM CURRENT USE OF INSULIN (H): ICD-10-CM

## 2022-09-08 RX ORDER — INSULIN GLARGINE 100 [IU]/ML
INJECTION, SOLUTION SUBCUTANEOUS
Qty: 3 ML | Refills: 0 | Status: SHIPPED | OUTPATIENT
Start: 2022-09-08 | End: 2022-10-03

## 2022-09-09 DIAGNOSIS — Z79.4 TYPE 2 DIABETES MELLITUS WITHOUT COMPLICATION, WITH LONG-TERM CURRENT USE OF INSULIN (H): ICD-10-CM

## 2022-09-09 DIAGNOSIS — E11.9 TYPE 2 DIABETES MELLITUS WITHOUT COMPLICATION, WITH LONG-TERM CURRENT USE OF INSULIN (H): ICD-10-CM

## 2022-09-10 DIAGNOSIS — E78.2 MIXED HYPERLIPIDEMIA: ICD-10-CM

## 2022-09-12 RX ORDER — SIMVASTATIN 40 MG
TABLET ORAL
Qty: 90 TABLET | Refills: 3 | Status: SHIPPED | OUTPATIENT
Start: 2022-09-12 | End: 2023-09-01

## 2022-10-02 DIAGNOSIS — Z79.4 TYPE 2 DIABETES MELLITUS WITHOUT COMPLICATION, WITH LONG-TERM CURRENT USE OF INSULIN (H): ICD-10-CM

## 2022-10-02 DIAGNOSIS — E11.9 TYPE 2 DIABETES MELLITUS WITHOUT COMPLICATION, WITH LONG-TERM CURRENT USE OF INSULIN (H): ICD-10-CM

## 2022-10-03 RX ORDER — INSULIN GLARGINE 100 [IU]/ML
INJECTION, SOLUTION SUBCUTANEOUS
Qty: 3 ML | Refills: 1 | Status: SHIPPED | OUTPATIENT
Start: 2022-10-03 | End: 2022-11-28

## 2022-10-03 NOTE — TELEPHONE ENCOUNTER
Insulin glargine      Last Written Prescription Date:  9/8/22  Last Fill Quantity: 3mL,   # refills: 0  Last Office Visit: 8/19/22  Future Office visit:    Next 5 appointments (look out 90 days)    Oct 26, 2022  1:15 PM  (Arrive by 1:00 PM)  [facility] [address] [facility] [address]  [phone]           Routing refill request to provider for review/approval because:

## 2022-10-12 DIAGNOSIS — E11.65 TYPE 2 DIABETES MELLITUS WITH HYPERGLYCEMIA, WITH LONG-TERM CURRENT USE OF INSULIN (H): ICD-10-CM

## 2022-10-12 DIAGNOSIS — Z79.4 TYPE 2 DIABETES MELLITUS WITH HYPERGLYCEMIA, WITH LONG-TERM CURRENT USE OF INSULIN (H): ICD-10-CM

## 2022-10-13 RX ORDER — SEMAGLUTIDE 1.34 MG/ML
INJECTION, SOLUTION SUBCUTANEOUS
Qty: 1.5 ML | Refills: 3 | Status: SHIPPED | OUTPATIENT
Start: 2022-10-13 | End: 2023-02-13

## 2022-10-26 ENCOUNTER — OFFICE VISIT (OUTPATIENT)
Dept: FAMILY MEDICINE | Facility: OTHER | Age: 62
End: 2022-10-26
Attending: FAMILY MEDICINE
Payer: COMMERCIAL

## 2022-10-26 VITALS
OXYGEN SATURATION: 97 % | SYSTOLIC BLOOD PRESSURE: 120 MMHG | HEART RATE: 70 BPM | BODY MASS INDEX: 34.2 KG/M2 | WEIGHT: 187 LBS | DIASTOLIC BLOOD PRESSURE: 70 MMHG

## 2022-10-26 DIAGNOSIS — I10 BENIGN ESSENTIAL HYPERTENSION: ICD-10-CM

## 2022-10-26 DIAGNOSIS — Z23 NEED FOR PROPHYLACTIC VACCINATION AND INOCULATION AGAINST INFLUENZA: ICD-10-CM

## 2022-10-26 DIAGNOSIS — E11.9 TYPE 2 DIABETES MELLITUS WITHOUT COMPLICATION, WITH LONG-TERM CURRENT USE OF INSULIN (H): Primary | ICD-10-CM

## 2022-10-26 DIAGNOSIS — F41.9 ANXIETY: ICD-10-CM

## 2022-10-26 DIAGNOSIS — I89.0 LYMPHEDEMA: ICD-10-CM

## 2022-10-26 DIAGNOSIS — Z79.4 TYPE 2 DIABETES MELLITUS WITHOUT COMPLICATION, WITH LONG-TERM CURRENT USE OF INSULIN (H): Primary | ICD-10-CM

## 2022-10-26 DIAGNOSIS — L82.1 SEBORRHEIC KERATOSIS: ICD-10-CM

## 2022-10-26 PROCEDURE — 90471 IMMUNIZATION ADMIN: CPT | Performed by: FAMILY MEDICINE

## 2022-10-26 PROCEDURE — 80048 BASIC METABOLIC PNL TOTAL CA: CPT | Performed by: FAMILY MEDICINE

## 2022-10-26 PROCEDURE — 36415 COLL VENOUS BLD VENIPUNCTURE: CPT | Performed by: FAMILY MEDICINE

## 2022-10-26 PROCEDURE — 82043 UR ALBUMIN QUANTITATIVE: CPT | Performed by: FAMILY MEDICINE

## 2022-10-26 PROCEDURE — 83036 HEMOGLOBIN GLYCOSYLATED A1C: CPT | Performed by: FAMILY MEDICINE

## 2022-10-26 PROCEDURE — 90682 RIV4 VACC RECOMBINANT DNA IM: CPT | Performed by: FAMILY MEDICINE

## 2022-10-26 PROCEDURE — 99214 OFFICE O/P EST MOD 30 MIN: CPT | Mod: 25 | Performed by: FAMILY MEDICINE

## 2022-10-26 RX ORDER — FUROSEMIDE 40 MG
40 TABLET ORAL 2 TIMES DAILY
Qty: 180 TABLET | Refills: 3 | Status: SHIPPED | OUTPATIENT
Start: 2022-10-26 | End: 2023-10-17

## 2022-10-26 RX ORDER — LANOLIN ALCOHOL/MO/W.PET/CERES
1000 CREAM (GRAM) TOPICAL DAILY
COMMUNITY

## 2022-10-26 RX ORDER — MULTIVIT-MIN/IRON/FOLIC ACID/K 18-600-40
1 CAPSULE ORAL DAILY
COMMUNITY

## 2022-10-26 ASSESSMENT — PAIN SCALES - GENERAL: PAINLEVEL: NO PAIN (0)

## 2022-10-26 NOTE — PROGRESS NOTES
Assessment & Plan     Type 2 diabetes mellitus without complication, with long-term current use of insulin (H)  Doing great.  Update labs and follow.    - Albumin Random Urine Quantitative with Creat Ratio; Future  - Basic metabolic panel; Future  - Hemoglobin A1c; Future  - MO FOOT EXAM NO CHARGE    Need for prophylactic vaccination and inoculation against influenza  Update.   - INFLUENZA QUAD, RECOMBINANT, P-FREE (RIV4) (FLUBLOK)    Benign essential hypertension  Stable.     Anxiety  Improved.  Continue to follow.  She is actually on the 2 pills of effexor but we will go up to 3 this winter if worsens again.     Lymphedema  Stable sx.      Seborrheic keratosis  Upper left back.  I wrote this down.  She is going to take photo and  will monitor as well.                     No follow-ups on file.    Nathanael Mena MD  [facility]   Suzy is a 62 year old, presenting for the following health issues:  Diabetes      HPI     Diabetes Follow-up    How often are you checking your blood sugar? Four or more times daily  Blood sugar testing frequency justification:  Uncontrolled diabetes  What time of day are you checking your blood sugars (select all that apply)?  Before and after meals  Have you had any blood sugars above 200?  Yes   Have you had any blood sugars below 70?  Yes     What symptoms do you notice when your blood sugar is low?  Shaky and Weak    What concerns do you have today about your diabetes? Blood sugar is often over 200 and Low blood sugar     Do you have any of these symptoms? (Select all that apply)  No numbness or tingling in feet.  No redness, sores or blisters on feet.  No complaints of excessive thirst.  No reports of blurry vision.  No significant changes to weight.      BP Readings from Last 2 Encounters:   10/26/22 120/70   08/19/22 120/66     Hemoglobin A1C (%)   Date Value   06/13/2022 7.8 (H)   02/07/2022 7.1 (H)   06/23/2021 7.8 (H)    09/24/2020 6.4 (H)     LDL Cholesterol Calculated (mg/dL)   Date Value   06/13/2022 63   06/23/2021 74   06/19/2020 73               Hypertension Follow-up      Do you check your blood pressure regularly outside of the clinic? No     Are you following a low salt diet? Yes    Are your blood pressures ever more than 140 on the top number (systolic) OR more   than 90 on the bottom number (diastolic), for example 140/90? NA    Diabetic foot exam   1. foot deformity   Yes  2. Current or previous foot ulceration     No  3. Current or previous pre-ulcerative calluses     No  4. previous partial amputation of one or both feet or complete amputation of one foot     No  5. peripheral neuropathy with evidence of callus formation     No  6. poor circulation     No  Approval given for 3 pairs of diabetic shoes and inserts if patient desires.        Review of Systems   Constitutional, HEENT, cardiovascular, pulmonary, gi and gu systems are negative, except as otherwise noted.      Objective    /70   Pulse 70   Wt 84.8 kg (187 lb)   SpO2 97%   BMI 34.20 kg/m    Body mass index is 34.2 kg/m .  Physical Exam   GENERAL: healthy, alert and no distress  NECK: no adenopathy, no asymmetry, masses, or scars and thyroid normal to palpation  RESP: lungs clear to auscultation - no rales, rhonchi or wheezes  CV: regular rate and rhythm, normal S1 S2, no S3 or S4, no murmur, click or rub, no peripheral edema and peripheral pulses strong  ABDOMEN: soft, nontender, no hepatosplenomegaly, no masses and bowel sounds normal  MS: no gross musculoskeletal defects noted, no edema  SKIN: likely sk left upper back and a couple more on the back as well.      Labs pending.

## 2022-10-27 LAB
ANION GAP SERPL CALCULATED.3IONS-SCNC: 16 MMOL/L (ref 7–15)
BUN SERPL-MCNC: 10.1 MG/DL (ref 8–23)
CALCIUM SERPL-MCNC: 9.6 MG/DL (ref 8.8–10.2)
CHLORIDE SERPL-SCNC: 98 MMOL/L (ref 98–107)
CREAT SERPL-MCNC: 0.66 MG/DL (ref 0.51–0.95)
CREAT UR-MCNC: 29 MG/DL
DEPRECATED HCO3 PLAS-SCNC: 26 MMOL/L (ref 22–29)
EST. AVERAGE GLUCOSE BLD GHB EST-MCNC: 154 MG/DL
GFR SERPL CREATININE-BSD FRML MDRD: >90 ML/MIN/1.73M2
GLUCOSE SERPL-MCNC: 66 MG/DL (ref 70–99)
HBA1C MFR BLD: 7 %
MICROALBUMIN UR-MCNC: <12 MG/L
MICROALBUMIN/CREAT UR: NORMAL MG/G{CREAT}
POTASSIUM SERPL-SCNC: 3.3 MMOL/L (ref 3.4–5.3)
SODIUM SERPL-SCNC: 140 MMOL/L (ref 136–145)

## 2022-11-22 DIAGNOSIS — N95.2 VAGINAL ATROPHY: ICD-10-CM

## 2022-11-22 RX ORDER — ESTRADIOL 0.1 MG/G
2 CREAM VAGINAL
Qty: 42.5 G | Refills: 3 | Status: SHIPPED | OUTPATIENT
Start: 2022-11-24 | End: 2023-08-22

## 2022-11-23 ENCOUNTER — IMMUNIZATION (OUTPATIENT)
Dept: FAMILY MEDICINE | Facility: OTHER | Age: 62
End: 2022-11-23
Attending: FAMILY MEDICINE
Payer: COMMERCIAL

## 2022-11-23 PROCEDURE — 0124A COVID-19 VACCINE BIVALENT BOOSTER 12+ (PFIZER): CPT

## 2022-11-23 PROCEDURE — 91312 COVID-19 VACCINE BIVALENT BOOSTER 12+ (PFIZER): CPT

## 2022-11-27 DIAGNOSIS — E11.9 TYPE 2 DIABETES MELLITUS WITHOUT COMPLICATION, WITH LONG-TERM CURRENT USE OF INSULIN (H): ICD-10-CM

## 2022-11-27 DIAGNOSIS — Z79.4 TYPE 2 DIABETES MELLITUS WITHOUT COMPLICATION, WITH LONG-TERM CURRENT USE OF INSULIN (H): ICD-10-CM

## 2022-11-28 RX ORDER — INSULIN GLARGINE 100 [IU]/ML
INJECTION, SOLUTION SUBCUTANEOUS
Qty: 15 ML | Refills: 4 | Status: SHIPPED | OUTPATIENT
Start: 2022-11-28 | End: 2023-04-12

## 2022-11-28 NOTE — TELEPHONE ENCOUNTER
Insulin Glargine (Basaglar KwikPen) 100 unit/ml pen    Last Written Prescription Date:  10/3/22  Last Fill Quantity: 3 ml,   # refills: 1  Last Office Visit: 10/26/22  Future Office visit:    Next 5 appointments (look out 90 days)    Feb 01, 2023  8:45 AM  (Arrive by 8:30 AM)  [facility] with [facility]ath[facility]el [facility]  [facility] [address] [place] [address] [address]VE [facility] [address]  [phone]

## 2023-01-31 NOTE — PROGRESS NOTES
Assessment & Plan     Type 2 diabetes mellitus without complication, with long-term current use of insulin (H)  Sugars a little better but the holiday and winter are usually a little tougher.  She is walking.    - Basic metabolic panel; Future  - Hemoglobin A1c; Future    Benign essential hypertension  Stable.      Depression, unspecified depression type  Stable.  Doing ok overall.  She is back down to the 150 and doing ok.      [place] disease  Stable.  No recent abx use for cellulitis which is always in play.          bmp and a1c             No follow-ups on file.    Nathnaael Mena MD  [facility]   Suzy is a 62 year old, presenting for the following health issues:  Diabetes      HPI     Diabetes Follow-up    How often are you checking your blood sugar? Four or more times daily  Blood sugar testing frequency justification:  Uncontrolled diabetes  What time of day are you checking your blood sugars (select all that apply)?  Before and after meals  Have you had any blood sugars above 200?  Yes   Have you had any blood sugars below 70?  Yes     What symptoms do you notice when your blood sugar is low?  Shaky and Weak    What concerns do you have today about your diabetes? Blood sugar is often over 200 and Low blood sugar     Do you have any of these symptoms? (Select all that apply)  No numbness or tingling in feet.  No redness, sores or blisters on feet.  No complaints of excessive thirst.  No reports of blurry vision.  No significant changes to weight.      BP Readings from Last 2 Encounters:   02/01/23 128/72   10/26/22 120/70     Hemoglobin A1C (%)   Date Value   10/26/2022 7.0 (H)   06/13/2022 7.8 (H)   06/23/2021 7.8 (H)   09/24/2020 6.4 (H)     LDL Cholesterol Calculated (mg/dL)   Date Value   06/13/2022 63   06/23/2021 74   06/19/2020 73               Hypertension Follow-up      Do you check your blood pressure regularly outside of the clinic? No     Are you following  a low salt diet? Yes    Are your blood pressures ever more than 140 on the top number (systolic) OR more   than 90 on the bottom number (diastolic), for example 140/90? NA          Review of Systems   Constitutional, HEENT, cardiovascular, pulmonary, gi and gu systems are negative, except as otherwise noted.      Objective    /72   Pulse 66   Temp 97.4  F (36.3  C) (Tympanic)   Wt 85.7 kg (189 lb)   SpO2 99%   BMI 34.57 kg/m    Body mass index is 34.57 kg/m .  Physical Exam   GENERAL: healthy, alert and no distress  NECK: no adenopathy, no asymmetry, masses, or scars and thyroid normal to palpation  RESP: lungs clear to auscultation - no rales, rhonchi or wheezes  CV: regular rate and rhythm, normal S1 S2, no S3 or S4, no murmur, click or rub, no peripheral edema and peripheral pulses strong  ABDOMEN: soft, nontender, no hepatosplenomegaly, no masses and bowel sounds normal  MS: no gross musculoskeletal defects noted, no edema        For her spine I sent her to chiropractic.  She has had success with Johanna in [place] at [facility] so I sent her there.  Her whole spine is in pain and the muscles are tight.

## 2023-02-01 ENCOUNTER — OFFICE VISIT (OUTPATIENT)
Dept: FAMILY MEDICINE | Facility: OTHER | Age: 63
End: 2023-02-01
Attending: FAMILY MEDICINE
Payer: COMMERCIAL

## 2023-02-01 VITALS
OXYGEN SATURATION: 99 % | DIASTOLIC BLOOD PRESSURE: 72 MMHG | SYSTOLIC BLOOD PRESSURE: 128 MMHG | TEMPERATURE: 97.4 F | HEART RATE: 66 BPM | BODY MASS INDEX: 34.57 KG/M2 | WEIGHT: 189 LBS

## 2023-02-01 DIAGNOSIS — M54.9 SPINE PAIN, MULTILEVEL: ICD-10-CM

## 2023-02-01 DIAGNOSIS — I10 BENIGN ESSENTIAL HYPERTENSION: ICD-10-CM

## 2023-02-01 DIAGNOSIS — Q82.0: ICD-10-CM

## 2023-02-01 DIAGNOSIS — Z79.4 TYPE 2 DIABETES MELLITUS WITHOUT COMPLICATION, WITH LONG-TERM CURRENT USE OF INSULIN (H): Primary | ICD-10-CM

## 2023-02-01 DIAGNOSIS — E11.9 TYPE 2 DIABETES MELLITUS WITHOUT COMPLICATION, WITH LONG-TERM CURRENT USE OF INSULIN (H): Primary | ICD-10-CM

## 2023-02-01 DIAGNOSIS — F32.A DEPRESSION, UNSPECIFIED DEPRESSION TYPE: ICD-10-CM

## 2023-02-01 LAB
ANION GAP SERPL CALCULATED.3IONS-SCNC: 15 MMOL/L (ref 7–15)
BUN SERPL-MCNC: 8.8 MG/DL (ref 8–23)
CALCIUM SERPL-MCNC: 9.2 MG/DL (ref 8.8–10.2)
CHLORIDE SERPL-SCNC: 103 MMOL/L (ref 98–107)
CREAT SERPL-MCNC: 0.65 MG/DL (ref 0.51–0.95)
DEPRECATED HCO3 PLAS-SCNC: 24 MMOL/L (ref 22–29)
EST. AVERAGE GLUCOSE BLD GHB EST-MCNC: 148 MG/DL
GFR SERPL CREATININE-BSD FRML MDRD: >90 ML/MIN/1.73M2
GLUCOSE SERPL-MCNC: 75 MG/DL (ref 70–99)
HBA1C MFR BLD: 6.8 %
POTASSIUM SERPL-SCNC: 4 MMOL/L (ref 3.4–5.3)
SODIUM SERPL-SCNC: 142 MMOL/L (ref 136–145)

## 2023-02-01 PROCEDURE — 83036 HEMOGLOBIN GLYCOSYLATED A1C: CPT | Performed by: FAMILY MEDICINE

## 2023-02-01 PROCEDURE — 80048 BASIC METABOLIC PNL TOTAL CA: CPT | Performed by: FAMILY MEDICINE

## 2023-02-01 PROCEDURE — 99214 OFFICE O/P EST MOD 30 MIN: CPT | Performed by: FAMILY MEDICINE

## 2023-02-01 PROCEDURE — 36415 COLL VENOUS BLD VENIPUNCTURE: CPT | Performed by: FAMILY MEDICINE

## 2023-02-01 RX ORDER — VENLAFAXINE HYDROCHLORIDE 150 MG/1
150 CAPSULE, EXTENDED RELEASE ORAL DAILY
COMMUNITY
Start: 2022-12-08 | End: 2023-06-05

## 2023-02-01 ASSESSMENT — PAIN SCALES - GENERAL: PAINLEVEL: NO PAIN (0)

## 2023-02-09 DIAGNOSIS — Z79.4 TYPE 2 DIABETES MELLITUS WITH HYPERGLYCEMIA, WITH LONG-TERM CURRENT USE OF INSULIN (H): ICD-10-CM

## 2023-02-09 DIAGNOSIS — E11.65 TYPE 2 DIABETES MELLITUS WITH HYPERGLYCEMIA, WITH LONG-TERM CURRENT USE OF INSULIN (H): ICD-10-CM

## 2023-02-13 RX ORDER — SEMAGLUTIDE 1.34 MG/ML
INJECTION, SOLUTION SUBCUTANEOUS
Qty: 1.5 ML | Refills: 3 | Status: SHIPPED | OUTPATIENT
Start: 2023-02-13 | End: 2023-05-11

## 2023-04-08 DIAGNOSIS — Z79.4 TYPE 2 DIABETES MELLITUS WITHOUT COMPLICATION, WITH LONG-TERM CURRENT USE OF INSULIN (H): ICD-10-CM

## 2023-04-08 DIAGNOSIS — E11.9 TYPE 2 DIABETES MELLITUS WITHOUT COMPLICATION, WITH LONG-TERM CURRENT USE OF INSULIN (H): ICD-10-CM

## 2023-04-10 NOTE — TELEPHONE ENCOUNTER
needles      Last Written Prescription Date:  9/1/21  Last Fill Quantity: 100,   # refills: 3  Last Office Visit: 2/1/23  Future Office visit:    Next 5 appointments (look out 90 days)    Jun 07, 2023 10:45 AM  (Arrive by 10:30 AM)  SHORT with Nathanael Mena MD  [facility] ) [address] [facility] [address]  [phone]

## 2023-04-11 RX ORDER — PEN NEEDLE, DIABETIC 31 GX5/16"
NEEDLE, DISPOSABLE MISCELLANEOUS
Qty: 100 EACH | Refills: 3 | Status: SHIPPED | OUTPATIENT
Start: 2023-04-11 | End: 2024-07-09

## 2023-04-12 DIAGNOSIS — E11.9 TYPE 2 DIABETES MELLITUS WITHOUT COMPLICATION, WITH LONG-TERM CURRENT USE OF INSULIN (H): ICD-10-CM

## 2023-04-12 DIAGNOSIS — Z79.4 TYPE 2 DIABETES MELLITUS WITHOUT COMPLICATION, WITH LONG-TERM CURRENT USE OF INSULIN (H): ICD-10-CM

## 2023-04-12 RX ORDER — INSULIN GLARGINE 100 [IU]/ML
INJECTION, SOLUTION SUBCUTANEOUS
Qty: 3 ML | Refills: 4 | Status: SHIPPED | OUTPATIENT
Start: 2023-04-12 | End: 2023-09-05

## 2023-05-10 DIAGNOSIS — Z79.4 TYPE 2 DIABETES MELLITUS WITH HYPERGLYCEMIA, WITH LONG-TERM CURRENT USE OF INSULIN (H): ICD-10-CM

## 2023-05-10 DIAGNOSIS — E11.65 TYPE 2 DIABETES MELLITUS WITH HYPERGLYCEMIA, WITH LONG-TERM CURRENT USE OF INSULIN (H): ICD-10-CM

## 2023-05-11 RX ORDER — SEMAGLUTIDE 0.68 MG/ML
INJECTION, SOLUTION SUBCUTANEOUS
Qty: 3 ML | Refills: 0 | Status: SHIPPED | OUTPATIENT
Start: 2023-05-11 | End: 2023-05-12

## 2023-05-11 NOTE — TELEPHONE ENCOUNTER
Ozempic  (0.25 or 0.5 MG/Dose) 2 MG/3 ML Pen  Last Written Prescription Date:  02/13/2023  Last Fill Quantity: 3,   # refills: 0  Last Office Visit: 10/26/2022  Future Office visit:    Next 5 appointments (look out 90 days)    Jun 07, 2023 10:45 AM  (Arrive by 10:30 AM)  SHORT with Nathanael [facility]ena MD  [facility] [address] [facility] [address] [facility] [address]  [phone]           Routing refill request to provider for review/approval because:  Need new script.

## 2023-05-15 RX ORDER — SEMAGLUTIDE 0.68 MG/ML
INJECTION, SOLUTION SUBCUTANEOUS
Qty: 3 ML | Refills: 0 | Status: SHIPPED | OUTPATIENT
Start: 2023-05-15 | End: 2023-06-12

## 2023-06-01 ENCOUNTER — HEALTH MAINTENANCE LETTER (OUTPATIENT)
Age: 63
End: 2023-06-01

## 2023-06-01 DIAGNOSIS — F34.1 DYSTHYMIA: ICD-10-CM

## 2023-06-01 DIAGNOSIS — F34.1 DYSTHYMIC DISORDER: ICD-10-CM

## 2023-06-01 NOTE — PROGRESS NOTES
"  Assessment & Plan     Type 2 diabetes mellitus without complication, with long-term current use of insulin (H)  Doing well.  Sugars up a bit with a little less activity with her back.  We talked aleve is ok several times a week.    - Hemoglobin A1c; Future  - Comprehensive metabolic panel; Future  - Lipid Profile; Future  - TSH with free T4 reflex; Future    Bilateral carpal tunnel syndrome  Ongoing.  Consider surgical intervention.  She will think on this and use the braces for now.      Strain of lumbar region, initial encounter  As above.  No red flags.  Monitor and use the aleve as above.      Morbid obesity (H)  No weight loss with the ozempic.      Benign essential hypertension  Stable.     Hepatic steatosis  Update cmp.     Mixed hyperlipidemia  Update lipids.      Update colon with cologuard.             BMI:   Estimated body mass index is 34.75 kg/m  as calculated from the following:    Height as of 8/19/22: 1.575 m (5' 2\").    Weight as of this encounter: 86.2 kg (190 lb).           No follow-ups on file.    Nathanael Mena MD  [facility]   Suzy is a 62 year old, presenting for the following health issues:  Diabetes         View : No data to display.              HPI     Diabetes Follow-up    How often are you checking your blood sugar? Four or more times daily  Blood sugar testing frequency justification:  Uncontrolled diabetes  What time of day are you checking your blood sugars (select all that apply)?  Before and after meals  Have you had any blood sugars above 200?  Yes   Have you had any blood sugars below 70?  No    What symptoms do you notice when your blood sugar is low?  None    What concerns do you have today about your diabetes? Blood sugar is often over 200 and Low blood sugar     Do you have any of these symptoms? (Select all that apply)  No numbness or tingling in feet.  No redness, sores or blisters on feet.  No complaints of excessive thirst.  No " reports of blurry vision.  No significant changes to weight.          Hyperlipidemia Follow-Up      Are you regularly taking any medication or supplement to lower your cholesterol?   Yes- simvastatin    Are you having muscle aches or other side effects that you think could be caused by your cholesterol lowering medication?  No    Hypertension Follow-up      Do you check your blood pressure regularly outside of the clinic? No     Are you following a low salt diet? Yes    Are your blood pressures ever more than 140 on the top number (systolic) OR more   than 90 on the bottom number (diastolic), for example 140/90? NA    BP Readings from Last 2 Encounters:   06/07/23 128/86   02/01/23 128/72     Hemoglobin A1C (%)   Date Value   02/01/2023 6.8 (H)   10/26/2022 7.0 (H)   06/23/2021 7.8 (H)   09/24/2020 6.4 (H)     LDL Cholesterol Calculated (mg/dL)   Date Value   06/13/2022 63   06/23/2021 74   06/19/2020 73               Review of Systems   Constitutional, HEENT, cardiovascular, pulmonary, gi and gu systems are negative, except as otherwise noted.      Objective    /86   Pulse 60   Temp 97.1  F (36.2  C) (Tympanic)   Wt 86.2 kg (190 lb)   SpO2 98%   BMI 34.75 kg/m    Body mass index is 34.75 kg/m .  Physical Exam   GENERAL: healthy, alert and no distress  NECK: no adenopathy, no asymmetry, masses, or scars and thyroid normal to palpation  RESP: lungs clear to auscultation - no rales, rhonchi or wheezes  CV: regular rate and rhythm, normal S1 S2, no S3 or S4, no murmur, click or rub, no peripheral edema and peripheral pulses strong  ABDOMEN: soft, nontender, no hepatosplenomegaly, no masses and bowel sounds normal  MS: no gross musculoskeletal defects noted, no edema

## 2023-06-02 NOTE — TELEPHONE ENCOUNTER
venlafaxine (EFFEXOR-XR) 75 MG 24 hr capsule (Discontinued) 4/22/2020     Last Written Prescription Date:  1/10/2020  Last Fill Quantity: 180,   # refills: 0  Last Office Visit: 2/01/2023  Future Office visit:    Next 5 appointments (look out 90 days)    Jun 07, 2023 10:45 AM  (Arrive by 10:30 AM)  [facility]el [facility]  [facility] [address]  [facility] [place] [address]  [phone]   Aug 22, 2023  8:30 AM  (Arrive by 8:15 AM)  Office Visit with Maryjane Lyon MD  [facility] ([facility] ) [address] [facility] [address]  [phone]           venlafaxine (EFFEXOR XR) 150 MG 24 hr capsule      Last Written Prescription Date:  Historical  Last Fill Quantity: 0,   # refills: 0

## 2023-06-05 RX ORDER — VENLAFAXINE HYDROCHLORIDE 75 MG/1
CAPSULE, EXTENDED RELEASE ORAL
Qty: 270 CAPSULE | Refills: 3 | Status: SHIPPED | OUTPATIENT
Start: 2023-06-05 | End: 2023-07-12

## 2023-06-05 RX ORDER — VENLAFAXINE HYDROCHLORIDE 150 MG/1
CAPSULE, EXTENDED RELEASE ORAL
Qty: 90 CAPSULE | Refills: 3 | Status: SHIPPED | OUTPATIENT
Start: 2023-06-05 | End: 2024-05-22

## 2023-06-07 ENCOUNTER — OFFICE VISIT (OUTPATIENT)
Dept: FAMILY MEDICINE | Facility: OTHER | Age: 63
End: 2023-06-07
Attending: FAMILY MEDICINE
Payer: COMMERCIAL

## 2023-06-07 VITALS
BODY MASS INDEX: 34.75 KG/M2 | DIASTOLIC BLOOD PRESSURE: 86 MMHG | SYSTOLIC BLOOD PRESSURE: 128 MMHG | WEIGHT: 190 LBS | TEMPERATURE: 97.1 F | HEART RATE: 60 BPM | OXYGEN SATURATION: 98 %

## 2023-06-07 DIAGNOSIS — E66.01 MORBID OBESITY (H): ICD-10-CM

## 2023-06-07 DIAGNOSIS — E11.9 TYPE 2 DIABETES MELLITUS WITHOUT COMPLICATION, WITH LONG-TERM CURRENT USE OF INSULIN (H): Primary | ICD-10-CM

## 2023-06-07 DIAGNOSIS — E78.2 MIXED HYPERLIPIDEMIA: ICD-10-CM

## 2023-06-07 DIAGNOSIS — G56.03 BILATERAL CARPAL TUNNEL SYNDROME: ICD-10-CM

## 2023-06-07 DIAGNOSIS — Z23 NEED FOR VACCINATION: ICD-10-CM

## 2023-06-07 DIAGNOSIS — K76.0 HEPATIC STEATOSIS: ICD-10-CM

## 2023-06-07 DIAGNOSIS — S39.012A STRAIN OF LUMBAR REGION, INITIAL ENCOUNTER: ICD-10-CM

## 2023-06-07 DIAGNOSIS — Z79.4 TYPE 2 DIABETES MELLITUS WITHOUT COMPLICATION, WITH LONG-TERM CURRENT USE OF INSULIN (H): Primary | ICD-10-CM

## 2023-06-07 DIAGNOSIS — I10 BENIGN ESSENTIAL HYPERTENSION: ICD-10-CM

## 2023-06-07 DIAGNOSIS — Z12.11 COLON CANCER SCREENING: ICD-10-CM

## 2023-06-07 LAB
ALBUMIN SERPL BCG-MCNC: 4.7 G/DL (ref 3.5–5.2)
ALP SERPL-CCNC: 82 U/L (ref 35–104)
ALT SERPL W P-5'-P-CCNC: 30 U/L (ref 10–35)
ANION GAP SERPL CALCULATED.3IONS-SCNC: 14 MMOL/L (ref 7–15)
AST SERPL W P-5'-P-CCNC: 33 U/L (ref 10–35)
BILIRUB SERPL-MCNC: 0.4 MG/DL
BUN SERPL-MCNC: 14.7 MG/DL (ref 8–23)
CALCIUM SERPL-MCNC: 9.8 MG/DL (ref 8.8–10.2)
CHLORIDE SERPL-SCNC: 102 MMOL/L (ref 98–107)
CHOLEST SERPL-MCNC: 138 MG/DL
CREAT SERPL-MCNC: 0.69 MG/DL (ref 0.51–0.95)
DEPRECATED HCO3 PLAS-SCNC: 26 MMOL/L (ref 22–29)
EST. AVERAGE GLUCOSE BLD GHB EST-MCNC: 151 MG/DL
GFR SERPL CREATININE-BSD FRML MDRD: >90 ML/MIN/1.73M2
GLUCOSE SERPL-MCNC: 65 MG/DL (ref 70–99)
HBA1C MFR BLD: 6.9 %
HDLC SERPL-MCNC: 38 MG/DL
LDLC SERPL CALC-MCNC: 68 MG/DL
NONHDLC SERPL-MCNC: 100 MG/DL
POTASSIUM SERPL-SCNC: 3.9 MMOL/L (ref 3.4–5.3)
PROT SERPL-MCNC: 7.5 G/DL (ref 6.4–8.3)
SODIUM SERPL-SCNC: 142 MMOL/L (ref 136–145)
T4 FREE SERPL-MCNC: 1.03 NG/DL (ref 0.9–1.7)
TRIGL SERPL-MCNC: 162 MG/DL
TSH SERPL DL<=0.005 MIU/L-ACNC: 4.74 UIU/ML (ref 0.3–4.2)

## 2023-06-07 PROCEDURE — 99214 OFFICE O/P EST MOD 30 MIN: CPT | Mod: 25 | Performed by: FAMILY MEDICINE

## 2023-06-07 PROCEDURE — 90677 PCV20 VACCINE IM: CPT | Performed by: FAMILY MEDICINE

## 2023-06-07 PROCEDURE — 84443 ASSAY THYROID STIM HORMONE: CPT | Performed by: FAMILY MEDICINE

## 2023-06-07 PROCEDURE — 80053 COMPREHEN METABOLIC PANEL: CPT | Performed by: FAMILY MEDICINE

## 2023-06-07 PROCEDURE — 90471 IMMUNIZATION ADMIN: CPT | Performed by: FAMILY MEDICINE

## 2023-06-07 PROCEDURE — 80061 LIPID PANEL: CPT | Performed by: FAMILY MEDICINE

## 2023-06-07 PROCEDURE — 84439 ASSAY OF FREE THYROXINE: CPT | Performed by: FAMILY MEDICINE

## 2023-06-07 PROCEDURE — 36415 COLL VENOUS BLD VENIPUNCTURE: CPT | Performed by: FAMILY MEDICINE

## 2023-06-07 PROCEDURE — 83036 HEMOGLOBIN GLYCOSYLATED A1C: CPT | Performed by: FAMILY MEDICINE

## 2023-06-07 ASSESSMENT — PAIN SCALES - GENERAL: PAINLEVEL: MILD PAIN (3)

## 2023-06-10 DIAGNOSIS — E11.65 TYPE 2 DIABETES MELLITUS WITH HYPERGLYCEMIA, WITH LONG-TERM CURRENT USE OF INSULIN (H): ICD-10-CM

## 2023-06-10 DIAGNOSIS — Z79.4 TYPE 2 DIABETES MELLITUS WITH HYPERGLYCEMIA, WITH LONG-TERM CURRENT USE OF INSULIN (H): ICD-10-CM

## 2023-06-12 RX ORDER — SEMAGLUTIDE 0.68 MG/ML
INJECTION, SOLUTION SUBCUTANEOUS
Qty: 3 ML | Refills: 5 | Status: SHIPPED | OUTPATIENT
Start: 2023-06-12 | End: 2023-12-04

## 2023-06-29 LAB — NONINV COLON CA DNA+OCC BLD SCRN STL QL: NEGATIVE

## 2023-07-10 ENCOUNTER — TELEPHONE (OUTPATIENT)
Dept: FAMILY MEDICINE | Facility: OTHER | Age: 63
End: 2023-07-10

## 2023-07-10 NOTE — TELEPHONE ENCOUNTER
12:16 PM    Reason for Call: OVERBOOK    Patient is having the following symptoms: Upper body discomfort / arm ache / off and on fever   for   5 days    The patient is requesting an appointment for Wednatalie or later  with Dr. Mena    Was an appointment offered for this call? No  If yes : Appointment type              Date    Preferred method for responding to this message: Telephone Call  What is your phone number ?  [phone]    If we cannot reach you directly, may we leave a detailed response at the number you provided? Yes    Can this message wait until your PCP/provider returns, if unavailable today? Not applicable,     Odette Johnson

## 2023-07-11 NOTE — PROGRESS NOTES
Assessment & Plan     [place] disease  Refill abx she took appropriately with the fever and aches, which is often how her cellulitis presents.  It never materialized.  We were thinking tick illness though no hx of tick.  No cough.  No dysuria.  Ongoing fever and achiness.  Get the workup as below and cover with doxy while we wait.  Warned about SE, etc.    - sulfamethoxazole-trimethoprim (BACTRIM DS) 800-160 MG tablet; Take 1 tablet by mouth 2 times daily  - amoxicillin (AMOXIL) 875 MG tablet; Take 1 tablet (875 mg) by mouth 2 times daily    Chronic edema  As above.    - sulfamethoxazole-trimethoprim (BACTRIM DS) 800-160 MG tablet; Take 1 tablet by mouth 2 times daily    Fever, unspecified fever cause  As above.  Not a clear cut case of anything specific raising tick illness on the differential.  Full workup, doxy coverage, and follow.    - Lyme Disease Total Abs Bld with Reflex to Confirm CLIA; Future  - Parasite stain; Future  - doxycycline hyclate (VIBRAMYCIN) 100 MG capsule; Take 1 capsule (100 mg) by mouth 2 times daily  - CBC with platelets and differential; Future  - Comprehensive metabolic panel (BMP + Alb, Alk Phos, ALT, AST, Total. Bili, TP); Future  - ESR: Erythrocyte sedimentation rate; Future  - UA Macroscopic with reflex to Microscopic and Culture; Future  - UA Macroscopic with reflex to Microscopic and Culture  - ESR: Erythrocyte sedimentation rate  - Comprehensive metabolic panel (BMP + Alb, Alk Phos, ALT, AST, Total. Bili, TP)  - CBC with platelets and differential  - Parasite stain  - Lyme Disease Total Abs Bld with Reflex to Confirm CLIA    Body aches  As aobve.    - Lyme Disease Total Abs Bld with Reflex to Confirm CLIA; Future  - Parasite stain; Future  - doxycycline hyclate (VIBRAMYCIN) 100 MG capsule; Take 1 capsule (100 mg) by mouth 2 times daily  - CBC with platelets and differential; Future  - Comprehensive metabolic panel (BMP + Alb, Alk Phos, ALT, AST, Total. Bili, TP); Future  - ESR:  "Erythrocyte sedimentation rate; Future  - UA Macroscopic with reflex to Microscopic and Culture; Future  - UA Macroscopic with reflex to Microscopic and Culture  - ESR: Erythrocyte sedimentation rate  - Comprehensive metabolic panel (BMP + Alb, Alk Phos, ALT, AST, Total. Bili, TP)  - CBC with platelets and differential  - Parasite stain  - Lyme Disease Total Abs Bld with Reflex to Confirm CLIA             BMI:   Estimated body mass index is 34.02 kg/m  as calculated from the following:    Height as of 8/19/22: 1.575 m (5' 2\").    Weight as of this encounter: 84.4 kg (186 lb).           No follow-ups on file.    Nathanael Mena MD  [facility]   Suzy is a 62 year old, presenting for the following health issues:  No chief complaint on file.         No data to display              HPI     Fever       Duration: June 25    Description (location/character/radiation): fever     Intensity:  moderate    Accompanying signs and symptoms: fever, body aches, skin sensitivity, fatigue, not sleeping, poor appetite    History (similar episodes/previous evaluation): 2 negative home covid test     Precipitating or alleviating factors: recent travel to [place]    Therapies tried and outcome: took abx for cellulitis when this started, tylenol and ibuprofen               Review of Systems   Constitutional, HEENT, cardiovascular, pulmonary, gi and gu systems are negative, except as otherwise noted.      Objective    BP (!) 162/90   Pulse 66   Temp 98  F (36.7  C) (Tympanic)   Wt 84.4 kg (186 lb)   SpO2 96%   BMI 34.02 kg/m    Body mass index is 34.02 kg/m .  Physical Exam   GENERAL: healthy, alert and no distress  EYES: Eyes grossly normal to inspection, PERRL and conjunctivae and sclerae normal  HENT: ear canals and TM's normal, nose and mouth without ulcers or lesions  NECK: no adenopathy, no asymmetry, masses, or scars and thyroid normal to palpation  RESP: lungs clear to auscultation - no " rales, rhonchi or wheezes  CV: regular rate and rhythm, normal S1 S2, no S3 or S4, no murmur, click or rub, no peripheral edema and peripheral pulses strong  ABDOMEN: soft, nontender, no hepatosplenomegaly, no masses and bowel sounds normal  MS: no gross musculoskeletal defects noted, no edema    Labs pending as above.      No xray here today otherwise I would have gotten one as a precaution, similar to why I got the UA (fever rather unexplained).  With normal oxygen and clear lungs I didn't send her to [place] to get one.

## 2023-07-12 ENCOUNTER — OFFICE VISIT (OUTPATIENT)
Dept: FAMILY MEDICINE | Facility: OTHER | Age: 63
End: 2023-07-12
Attending: FAMILY MEDICINE
Payer: COMMERCIAL

## 2023-07-12 VITALS
HEART RATE: 66 BPM | SYSTOLIC BLOOD PRESSURE: 162 MMHG | TEMPERATURE: 98 F | OXYGEN SATURATION: 96 % | BODY MASS INDEX: 34.02 KG/M2 | DIASTOLIC BLOOD PRESSURE: 90 MMHG | WEIGHT: 186 LBS

## 2023-07-12 DIAGNOSIS — R50.9 FEVER, UNSPECIFIED FEVER CAUSE: Primary | ICD-10-CM

## 2023-07-12 DIAGNOSIS — Q82.0: ICD-10-CM

## 2023-07-12 DIAGNOSIS — R52 BODY ACHES: ICD-10-CM

## 2023-07-12 DIAGNOSIS — R60.9 CHRONIC EDEMA: ICD-10-CM

## 2023-07-12 LAB
ALBUMIN SERPL BCG-MCNC: 4.3 G/DL (ref 3.5–5.2)
ALBUMIN UR-MCNC: 30 MG/DL
ALP SERPL-CCNC: 99 U/L (ref 35–104)
ALT SERPL W P-5'-P-CCNC: 20 U/L (ref 0–50)
ANION GAP SERPL CALCULATED.3IONS-SCNC: 18 MMOL/L (ref 7–15)
APPEARANCE UR: ABNORMAL
AST SERPL W P-5'-P-CCNC: 24 U/L (ref 0–45)
BACTERIA #/AREA URNS HPF: ABNORMAL /HPF
BASOPHILS # BLD AUTO: 0 10E3/UL (ref 0–0.2)
BASOPHILS NFR BLD AUTO: 0 %
BILIRUB SERPL-MCNC: 0.5 MG/DL
BILIRUB UR QL STRIP: ABNORMAL
BUN SERPL-MCNC: 8.4 MG/DL (ref 8–23)
CALCIUM SERPL-MCNC: 9.7 MG/DL (ref 8.8–10.2)
CHLORIDE SERPL-SCNC: 98 MMOL/L (ref 98–107)
COLOR UR AUTO: ABNORMAL
CREAT SERPL-MCNC: 0.57 MG/DL (ref 0.51–0.95)
DEPRECATED HCO3 PLAS-SCNC: 22 MMOL/L (ref 22–29)
EOSINOPHIL # BLD AUTO: 0.1 10E3/UL (ref 0–0.7)
EOSINOPHIL NFR BLD AUTO: 1 %
ERYTHROCYTE [DISTWIDTH] IN BLOOD BY AUTOMATED COUNT: 13.3 % (ref 10–15)
ERYTHROCYTE [SEDIMENTATION RATE] IN BLOOD BY WESTERGREN METHOD: 23 MM/HR (ref 0–30)
GFR SERPL CREATININE-BSD FRML MDRD: >90 ML/MIN/1.73M2
GLUCOSE SERPL-MCNC: 130 MG/DL (ref 70–99)
GLUCOSE UR STRIP-MCNC: NEGATIVE MG/DL
HCT VFR BLD AUTO: 40.7 % (ref 35–47)
HGB BLD-MCNC: 13.8 G/DL (ref 11.7–15.7)
HGB UR QL STRIP: ABNORMAL
IMM GRANULOCYTES # BLD: 0 10E3/UL
IMM GRANULOCYTES NFR BLD: 0 %
KETONES UR STRIP-MCNC: 15 MG/DL
LEUKOCYTE ESTERASE UR QL STRIP: NEGATIVE
LYMPHOCYTES # BLD AUTO: 2.6 10E3/UL (ref 0.8–5.3)
LYMPHOCYTES NFR BLD AUTO: 25 %
MCH RBC QN AUTO: 29.4 PG (ref 26.5–33)
MCHC RBC AUTO-ENTMCNC: 33.9 G/DL (ref 31.5–36.5)
MCV RBC AUTO: 87 FL (ref 78–100)
MONOCYTES # BLD AUTO: 0.8 10E3/UL (ref 0–1.3)
MONOCYTES NFR BLD AUTO: 8 %
NEUTROPHILS # BLD AUTO: 6.7 10E3/UL (ref 1.6–8.3)
NEUTROPHILS NFR BLD AUTO: 66 %
NITRATE UR QL: NEGATIVE
PH UR STRIP: 5.5 [PH] (ref 5–7)
PLATELET # BLD AUTO: 332 10E3/UL (ref 150–450)
POTASSIUM SERPL-SCNC: 3.5 MMOL/L (ref 3.4–5.3)
PROT SERPL-MCNC: 7.5 G/DL (ref 6.4–8.3)
RBC # BLD AUTO: 4.69 10E6/UL (ref 3.8–5.2)
RBC #/AREA URNS AUTO: ABNORMAL /HPF
SODIUM SERPL-SCNC: 138 MMOL/L (ref 136–145)
SP GR UR STRIP: 1.02 (ref 1–1.03)
SQUAMOUS #/AREA URNS AUTO: ABNORMAL /LPF
UROBILINOGEN UR STRIP-ACNC: 0.2 E.U./DL
WBC # BLD AUTO: 10.2 10E3/UL (ref 4–11)
WBC #/AREA URNS AUTO: ABNORMAL /HPF

## 2023-07-12 PROCEDURE — 87086 URINE CULTURE/COLONY COUNT: CPT | Performed by: FAMILY MEDICINE

## 2023-07-12 PROCEDURE — 85652 RBC SED RATE AUTOMATED: CPT | Performed by: FAMILY MEDICINE

## 2023-07-12 PROCEDURE — 87207 SMEAR SPECIAL STAIN: CPT | Performed by: FAMILY MEDICINE

## 2023-07-12 PROCEDURE — 86617 LYME DISEASE ANTIBODY: CPT | Performed by: FAMILY MEDICINE

## 2023-07-12 PROCEDURE — 85025 COMPLETE CBC W/AUTO DIFF WBC: CPT | Performed by: FAMILY MEDICINE

## 2023-07-12 PROCEDURE — 86618 LYME DISEASE ANTIBODY: CPT | Performed by: FAMILY MEDICINE

## 2023-07-12 PROCEDURE — 80053 COMPREHEN METABOLIC PANEL: CPT | Performed by: FAMILY MEDICINE

## 2023-07-12 PROCEDURE — 99214 OFFICE O/P EST MOD 30 MIN: CPT | Performed by: FAMILY MEDICINE

## 2023-07-12 PROCEDURE — 81001 URINALYSIS AUTO W/SCOPE: CPT | Performed by: FAMILY MEDICINE

## 2023-07-12 PROCEDURE — 36415 COLL VENOUS BLD VENIPUNCTURE: CPT | Performed by: FAMILY MEDICINE

## 2023-07-12 RX ORDER — DOXYCYCLINE 100 MG/1
100 CAPSULE ORAL 2 TIMES DAILY
Qty: 20 CAPSULE | Refills: 0 | Status: SHIPPED | OUTPATIENT
Start: 2023-07-12 | End: 2023-08-22

## 2023-07-12 RX ORDER — AMOXICILLIN 875 MG
875 TABLET ORAL 2 TIMES DAILY
Qty: 14 TABLET | Refills: 0 | Status: SHIPPED | OUTPATIENT
Start: 2023-07-12 | End: 2023-08-22

## 2023-07-12 RX ORDER — SULFAMETHOXAZOLE/TRIMETHOPRIM 800-160 MG
1 TABLET ORAL 2 TIMES DAILY
Qty: 14 TABLET | Refills: 0 | Status: SHIPPED | OUTPATIENT
Start: 2023-07-12 | End: 2024-06-04

## 2023-07-12 ASSESSMENT — PAIN SCALES - GENERAL: PAINLEVEL: MODERATE PAIN (5)

## 2023-07-13 ENCOUNTER — TELEPHONE (OUTPATIENT)
Dept: FAMILY MEDICINE | Facility: OTHER | Age: 63
End: 2023-07-13

## 2023-07-13 NOTE — TELEPHONE ENCOUNTER
----- Message from Nathanael Mena MD sent at 7/13/2023  8:33 AM CDT -----  Stable.  Nathanael Mena MD

## 2023-07-14 ENCOUNTER — TELEPHONE (OUTPATIENT)
Dept: FAMILY MEDICINE | Facility: OTHER | Age: 63
End: 2023-07-14

## 2023-07-14 LAB
ANAPLASMA BLD MOD GIEMSA: NEGATIVE
B BURGDOR IGG SERPL QL IA: 8.39 INDEX
B BURGDOR IGG SERPL QL IA: REACTIVE
B BURGDOR IGG+IGM SER QL: 4.17
B BURGDOR IGM SERPL QL IA: 7.91 INDEX
B BURGDOR IGM SERPL QL IA: REACTIVE
B MICROTI BLD SMEAR: NEGATIVE
EHRLICHIA SPEC QL MICRO: NEGATIVE

## 2023-07-14 NOTE — TELEPHONE ENCOUNTER
----- Message from YUKI Holley CNP sent at 7/14/2023  4:30 PM CDT -----  Positive for lyme's. On Doxycycline.

## 2023-07-15 LAB — BACTERIA UR CULT: NO GROWTH

## 2023-08-21 NOTE — PROGRESS NOTES
Answers submitted by the patient for this visit:  Patient Health Questionnaire (Submitted on 2023)  PHQ9 TOTAL SCORE: 0  HEMANTH-7 (Submitted on 2023)  HEMANTH 7 TOTAL SCORE: 0     SUBJECTIVE:   CC: Suzy is an 63 year old who presents for preventive health visit.       2023     8:19 AM   Additional Questions   Roomed by Brandie Lee       Healthy Habits:     Getting at least 3 servings of Calcium per day:  Yes    Bi-annual eye exam:  NO    Dental care twice a year:  Yes    Sleep apnea or symptoms of sleep apnea:  None    Diet:  Regular (no restrictions)    Frequency of exercise:  2-3 days/week    Duration of exercise:  30-45 minutes    Medication side effects:  None    Additional concerns today:  No    Social History     Tobacco Use    Smoking status: Never    Smokeless tobacco: Never   Substance Use Topics    Alcohol use: No           2023     9:18 AM   Alcohol Use   Prescreen: >3 drinks/day or >7 drinks/week? No          No data to display              Reviewed orders with patient.  Reviewed health maintenance and updated orders accordingly - Yes    Breast Cancer Screenin/15/2021    11:10 AM   Breast CA Risk Assessment (FHS-7)   Do you have a family history of breast, colon, or ovarian cancer? No / Unknown       click delete button to remove this line now  Mammogram Screening: Recommended mammography every 1-2 years with patient discussion and risk factor consideration  Pertinent mammograms are reviewed under the imaging tab.    History of abnormal Pap smear: Status post benign hysterectomy. Health Maintenance and Surgical History updated     Reviewed and updated as needed this visit by clinical staff   Tobacco  Allergies  Meds  Problems  Med Hx  Surg Hx  Fam Hx          Reviewed and updated as needed this visit by Provider   Tobacco  Allergies  Meds  Problems  Med Hx  Surg Hx  Fam Hx           Review of Systems   Constitutional:  Negative for chills and fever.   HENT:   "Negative for congestion, ear pain, hearing loss and sore throat.    Eyes:  Negative for pain and visual disturbance.   Respiratory:  Negative for cough and shortness of breath.    Cardiovascular:  Negative for chest pain, palpitations and peripheral edema.   Gastrointestinal:  Negative for abdominal pain, constipation, diarrhea, heartburn, hematochezia and nausea.   Breasts:  Negative for tenderness, breast mass and discharge.   Genitourinary:  Negative for dysuria, frequency, genital sores, hematuria, pelvic pain, urgency, vaginal bleeding and vaginal discharge.   Musculoskeletal:  Negative for arthralgias, joint swelling and myalgias.   Skin:  Negative for rash.   Neurological:  Negative for dizziness, weakness, headaches and paresthesias.   Psychiatric/Behavioral:  Negative for mood changes. The patient is not nervous/anxious.         OBJECTIVE:   /84 (BP Location: Left arm, Patient Position: Sitting, Cuff Size: Adult Regular)   Pulse 70   Temp 97  F (36.1  C) (Tympanic)   Resp 16   Ht 1.575 m (5' 2\")   Wt 80.3 kg (177 lb)   SpO2 98%   BMI 32.37 kg/m    Physical Exam  GENERAL APPEARANCE: healthy, alert and no distress  EYES: Eyes grossly normal to inspection, PERRL and conjunctivae and sclerae normal  HENT: ear canals and TM's normal, nose and mouth without ulcers or lesions, oropharynx clear and oral mucous membranes moist  NECK: no adenopathy, no asymmetry, masses, or scars and thyroid normal to palpation  RESP: lungs clear to auscultation - no rales, rhonchi or wheezes  BREAST: normal without masses, tenderness or nipple discharge and no palpable axillary masses or adenopathy  CV: regular rate and rhythm, normal S1 S2, no S3 or S4, no murmur, click or rub, no peripheral edema and peripheral pulses strong  ABDOMEN: soft, nontender, no hepatosplenomegaly, no masses and bowel sounds normal   (female): normal urethral meatus, vaginal mucosal atrophy noted, there is some resoption of the labia, " bilaterally with some residual erythema, no pale tissues  MS: no musculoskeletal defects are noted and gait is age appropriate without ataxia  SKIN: no suspicious lesions or rashes  NEURO: Normal strength and tone, sensory exam grossly normal, mentation intact and speech normal  PSYCH: mentation appears normal and affect normal/bright    Diagnostic Test Results:  Labs reviewed in Epic  No results found for any visits on 08/22/23.    ASSESSMENT/PLAN:   Answers submitted by the patient for this visit:  Patient Health Questionnaire (Submitted on 8/22/2023)  PHQ9 TOTAL SCORE: 0  HEMANTH-7 (Submitted on 8/22/2023)  HEMANTH 7 TOTAL SCORE: 0    Patient has been advised of split billing requirements and indicates understanding: Yes    Suzy was seen today for physical.    Diagnoses and all orders for this visit:    Routine general medical examination at a health care facility    Atrophic vaginitis / Vaginal atrophy  Increase to three times weekly.   -     estradiol (ESTRACE) 0.1 MG/GM vaginal cream; Place 2 g vaginally three times a week    Type 2 diabetes mellitus with hyperglycemia, with long-term current use of insulin (H)  Controlled    Obesity / Hepatic steatosis / Mixed hyperlipidemia  Weight is down about 10 lbs since 1 year ago. Continue healthy diet and exercise changes.     Benign essential hypertension  Stable    COUNSELING:  Reviewed preventive health counseling, as reflected in patient instructions        She reports that she has never smoked. She has never used smokeless tobacco.      Maryjane Lyon MD  [facility]

## 2023-08-22 ENCOUNTER — OFFICE VISIT (OUTPATIENT)
Dept: FAMILY MEDICINE | Facility: OTHER | Age: 63
End: 2023-08-22
Attending: FAMILY MEDICINE
Payer: COMMERCIAL

## 2023-08-22 VITALS
DIASTOLIC BLOOD PRESSURE: 84 MMHG | BODY MASS INDEX: 32.57 KG/M2 | HEIGHT: 62 IN | OXYGEN SATURATION: 98 % | HEART RATE: 70 BPM | WEIGHT: 177 LBS | RESPIRATION RATE: 16 BRPM | SYSTOLIC BLOOD PRESSURE: 128 MMHG | TEMPERATURE: 97 F

## 2023-08-22 DIAGNOSIS — Z79.4 TYPE 2 DIABETES MELLITUS WITH HYPERGLYCEMIA, WITH LONG-TERM CURRENT USE OF INSULIN (H): Primary | ICD-10-CM

## 2023-08-22 DIAGNOSIS — E78.2 MIXED HYPERLIPIDEMIA: ICD-10-CM

## 2023-08-22 DIAGNOSIS — K76.0 HEPATIC STEATOSIS: ICD-10-CM

## 2023-08-22 DIAGNOSIS — E11.65 TYPE 2 DIABETES MELLITUS WITH HYPERGLYCEMIA, WITH LONG-TERM CURRENT USE OF INSULIN (H): Primary | ICD-10-CM

## 2023-08-22 DIAGNOSIS — E66.9 OBESITY (BMI 30-39.9): ICD-10-CM

## 2023-08-22 DIAGNOSIS — Z00.00 ROUTINE GENERAL MEDICAL EXAMINATION AT A HEALTH CARE FACILITY: ICD-10-CM

## 2023-08-22 DIAGNOSIS — I10 BENIGN ESSENTIAL HYPERTENSION: ICD-10-CM

## 2023-08-22 DIAGNOSIS — N95.2 ATROPHIC VAGINITIS: ICD-10-CM

## 2023-08-22 DIAGNOSIS — N95.2 VAGINAL ATROPHY: ICD-10-CM

## 2023-08-22 PROBLEM — Q82.0: Status: RESOLVED | Noted: 2023-02-01 | Resolved: 2023-08-22

## 2023-08-22 PROCEDURE — 99214 OFFICE O/P EST MOD 30 MIN: CPT | Performed by: FAMILY MEDICINE

## 2023-08-22 RX ORDER — ESTRADIOL 0.1 MG/G
2 CREAM VAGINAL
Qty: 42.5 G | Refills: 3 | Status: SHIPPED | OUTPATIENT
Start: 2023-08-23 | End: 2024-02-26

## 2023-08-22 ASSESSMENT — PATIENT HEALTH QUESTIONNAIRE - PHQ9
SUM OF ALL RESPONSES TO PHQ QUESTIONS 1-9: 0
SUM OF ALL RESPONSES TO PHQ QUESTIONS 1-9: 0

## 2023-08-22 ASSESSMENT — ENCOUNTER SYMPTOMS
ARTHRALGIAS: 0
DIZZINESS: 0
HEMATURIA: 0
DYSURIA: 0
SORE THROAT: 0
NAUSEA: 0
ABDOMINAL PAIN: 0
EYE PAIN: 0
HEARTBURN: 0
WEAKNESS: 0
HEMATOCHEZIA: 0
PARESTHESIAS: 0
DIARRHEA: 0
CHILLS: 0
MYALGIAS: 0
NERVOUS/ANXIOUS: 0
FREQUENCY: 0
HEADACHES: 0
BREAST MASS: 0
JOINT SWELLING: 0
FEVER: 0
COUGH: 0
PALPITATIONS: 0
SHORTNESS OF BREATH: 0
CONSTIPATION: 0

## 2023-08-22 ASSESSMENT — ANXIETY QUESTIONNAIRES
7. FEELING AFRAID AS IF SOMETHING AWFUL MIGHT HAPPEN: NOT AT ALL
GAD7 TOTAL SCORE: 0
3. WORRYING TOO MUCH ABOUT DIFFERENT THINGS: NOT AT ALL
5. BEING SO RESTLESS THAT IT IS HARD TO SIT STILL: NOT AT ALL
4. TROUBLE RELAXING: NOT AT ALL
GAD7 TOTAL SCORE: 0
1. FEELING NERVOUS, ANXIOUS, OR ON EDGE: NOT AT ALL
IF YOU CHECKED OFF ANY PROBLEMS ON THIS QUESTIONNAIRE, HOW DIFFICULT HAVE THESE PROBLEMS MADE IT FOR YOU TO DO YOUR WORK, TAKE CARE OF THINGS AT HOME, OR GET ALONG WITH OTHER PEOPLE: NOT DIFFICULT AT ALL
2. NOT BEING ABLE TO STOP OR CONTROL WORRYING: NOT AT ALL
6. BECOMING EASILY ANNOYED OR IRRITABLE: NOT AT ALL

## 2023-08-22 ASSESSMENT — PAIN SCALES - GENERAL: PAINLEVEL: NO PAIN (0)

## 2023-08-22 NOTE — PROGRESS NOTES
SUBJECTIVE:   CC: Suzy is an 63 year old who presents for preventive health visit.       2023     9:00 AM   Additional Questions   Roomed by Brandie SHAH    Today's PHQ-9 Score:       2023     7:00 AM   PHQ-9 SCORE   PHQ-9 Total Score [place] 0   PHQ-9 Total Score 0           {Add if <65 person on Medicare  - Required Questions (Optional):964292}  {Outside tests to abstract? :614489}    {additional problems to add (Optional):709237}      Social History     Tobacco Use    Smoking status: Never    Smokeless tobacco: Never   Substance Use Topics    Alcohol use: No     {Rooming staff  Click this link to complete the Prescreen if response below is not for today's visit  Alcohol Use Prescreen >3 drinks/day or > 7 drinks/week.  If the prescreen question answer is YES, complete the full AUDIT  :791197}        2023     9:18 AM   Alcohol Use   Prescreen: >3 drinks/day or >7 drinks/week? No   {add AUDIT responses (Optional) (A score of 7 for adult men is an indication of hazardous drinking; a score of 8 or more is an indication of an alcohol use disorder.  A score of 7 or more for adult women is an indication of hazardous drinking or an alchohol use disorder):281110}  Reviewed orders with patient.  Reviewed health maintenance and updated orders accordingly - { :589249}  {Chronicprobdata (optional):592384}    Breast Cancer Screenin/15/2021    11:10 AM   Breast CA Risk Assessment (FHS-7)   Do you have a family history of breast, colon, or ovarian cancer? No / Unknown       {If any of the questions to the BCRA (FHS-7) are answered yes, consider ordering referral for genetic counseling (Optional) :870590}  {AMB Mammogram Decision Support (Optional) :864040}  Pertinent mammograms are reviewed under the imaging tab.    History of abnormal Pap smear: { :439935}     Reviewed and updated as needed this visit by clinical staff   Tobacco  Allergies  Meds  Problems  Med Hx  Surg Hx  Fam  "Hx          Reviewed and updated as needed this visit by Provider   Tobacco  Allergies  Meds  Problems  Med Hx  Surg Hx  Fam Hx         {HISTORY OPTIONS (Optional):909098}    Review of Systems  {FEMALE ROS (Optional):268086}     OBJECTIVE:   /84 (BP Location: Left arm, Patient Position: Sitting, Cuff Size: Adult Regular)   Pulse 70   Temp 97  F (36.1  C) (Tympanic)   Resp 16   Ht 1.575 m (5' 2\")   Wt 80.3 kg (177 lb)   SpO2 98%   BMI 32.37 kg/m    Physical Exam  {Exam Choices (Optional):380814}    {Diagnostic Test Results (Optional):702116}    ASSESSMENT/PLAN:   {Diag Picklist:182639}    {Patient advised of split billing (Optional):856020}      COUNSELING:  {FEMALE COUNSELING MESSAGES:559627::\"Reviewed preventive health counseling, as reflected in patient instructions\"}      BMI:   Estimated body mass index is 32.37 kg/m  as calculated from the following:    Height as of this encounter: 1.575 m (5' 2\").    Weight as of this encounter: 80.3 kg (177 lb).   {Weight Management Plan needed for ACO:848988}      She reports that she has never smoked. She has never used smokeless tobacco.      {Counseling Resources  [place] Preventive Services Task Force  Cholesterol Screening  Health diet/nutrition  Pooled Cohorts Equation Calculator  USDA's MyPlate  ASA Prophylaxis  Lung CA Screening  Osteoporosis prevention/bone health :086863}  {Breast Cancer Risk Calculator  BRCA-Related Cancer Risk Assessment FHS-7 Tool :214796}  Maryjane Lyon MD  [facility] [place] submitted by the patient for this visit:  Patient Health Questionnaire (Submitted on 8/22/2023)  PHQ9 TOTAL SCORE: 0  HEMANTH-7 (Submitted on 8/22/2023)  HEMANTH 7 TOTAL SCORE: 0    "

## 2023-08-30 ENCOUNTER — TRANSFERRED RECORDS (OUTPATIENT)
Dept: HEALTH INFORMATION MANAGEMENT | Facility: CLINIC | Age: 63
End: 2023-08-30

## 2023-08-30 DIAGNOSIS — E78.2 MIXED HYPERLIPIDEMIA: ICD-10-CM

## 2023-08-30 LAB — RETINOPATHY: NEGATIVE

## 2023-08-31 NOTE — TELEPHONE ENCOUNTER
Simvastatin       Last Written Prescription Date:  09/12/22  Last Fill Quantity: 90,   # refills: 3  Last Office Visit: 08/22/23  Future Office visit:    Next 5 appointments (look out 90 days)      Sep 20, 2023  9:45 AM  (Arrive by 9:30 AM)  SHORT with Nathanael Mena MD  [facility] ([facility] ) [address] [facility] [place] [address]  [phone]             Routing refill request to provider for review/approval because:

## 2023-09-01 RX ORDER — SIMVASTATIN 40 MG
40 TABLET ORAL AT BEDTIME
Qty: 90 TABLET | Refills: 2 | Status: SHIPPED | OUTPATIENT
Start: 2023-09-01 | End: 2024-05-22

## 2023-09-02 DIAGNOSIS — E11.9 TYPE 2 DIABETES MELLITUS WITHOUT COMPLICATION, WITH LONG-TERM CURRENT USE OF INSULIN (H): ICD-10-CM

## 2023-09-02 DIAGNOSIS — Z79.4 TYPE 2 DIABETES MELLITUS WITHOUT COMPLICATION, WITH LONG-TERM CURRENT USE OF INSULIN (H): ICD-10-CM

## 2023-09-05 RX ORDER — INSULIN GLARGINE 100 [IU]/ML
INJECTION, SOLUTION SUBCUTANEOUS
Qty: 15 ML | Refills: 2 | Status: SHIPPED | OUTPATIENT
Start: 2023-09-05 | End: 2023-11-30

## 2023-09-05 NOTE — TELEPHONE ENCOUNTER
Basaglar      Last Written Prescription Date:  4/12/23  Last Fill Quantity: 3mL,   # refills: 4  Last Office Visit: 8/22/23  Future Office visit:    Next 5 appointments (look out 90 days)      Sep 20, 2023  9:45 AM  (Arrive by 9:30 AM)  SHORT with Nathanael Mena MD  [facility] [address] [facility] [address]  [phone]             Routing refill request to provider for review/approval because:

## 2023-09-06 DIAGNOSIS — Z79.4 TYPE 2 DIABETES MELLITUS WITHOUT COMPLICATION, WITH LONG-TERM CURRENT USE OF INSULIN (H): ICD-10-CM

## 2023-09-06 DIAGNOSIS — E11.9 TYPE 2 DIABETES MELLITUS WITHOUT COMPLICATION, WITH LONG-TERM CURRENT USE OF INSULIN (H): ICD-10-CM

## 2023-09-06 NOTE — TELEPHONE ENCOUNTER
Metformin 1000 mg      Last Written Prescription Date:  9-12-22  Last Fill Quantity: 180,   # refills: 3  Last Office Visit: 8-22-23  Future Office visit:    Next 5 appointments (look out 90 days)      Sep 20, 2023  9:45 AM  (Arrive by 9:30 AM)  SHORT with Nathanael Mena MD  [facility] ) [address]  [facility] [place] [address]  [phone]

## 2023-09-14 NOTE — PROGRESS NOTES
"  Assessment & Plan     Type 2 diabetes mellitus without complication, with long-term current use of insulin (H)  Update and follow.  Doing well.  Has had more lows since lyme disease oddly, but definitely a change since then.      Need for prophylactic vaccination and inoculation against influenza  Update.     Lyme disease  Reviewed labs.  Had strong positive with IGG and IGM despite no tick history.      Benign essential hypertension  Stable.               BMI:   Estimated body mass index is 33.29 kg/m  as calculated from the following:    Height as of 8/22/23: 1.575 m (5' 2\").    Weight as of this encounter: 82.6 kg (182 lb).           No follow-ups on file.    Nathanael Mena MD  [facility]   Suzy is a 63 year old, presenting for the following health issues:  Diabetes      HPI     Patient is interested in flu shot    Diabetes Follow-up    How often are you checking your blood sugar? Four or more times daily  Blood sugar testing frequency justification:  Patient modifying lifestyle changes (diet, exercise) with blood sugars  What time of day are you checking your blood sugars (select all that apply)?  Before meals and Morning time   Have you had any blood sugars above 200?  Yes 260's   Have you had any blood sugars below 70?  Yes 48  What symptoms do you notice when your blood sugar is low?  Shaky, Dizzy, Weak, Blurred vision, and Confusion  What concerns do you have today about your diabetes? None   Do you have any of these symptoms? (Select all that apply)  No numbness or tingling in feet.  No redness, sores or blisters on feet.  No complaints of excessive thirst.  No reports of blurry vision.  No significant changes to weight.      BP Readings from Last 2 Encounters:   09/20/23 129/73   08/22/23 128/84     Hemoglobin A1C (%)   Date Value   06/07/2023 6.9 (H)   02/01/2023 6.8 (H)   06/23/2021 7.8 (H)   09/24/2020 6.4 (H)     LDL Cholesterol Calculated (mg/dL)   Date " Value   06/07/2023 68   06/13/2022 63   06/23/2021 74   06/19/2020 73             Hypertension Follow-up    Do you check your blood pressure regularly outside of the clinic? No   Are you following a low salt diet? Yes  Are your blood pressures ever more than 140 on the top number (systolic) OR more   than 90 on the bottom number (diastolic), for example 140/90? Patient does not check BP at home     Diabetic foot exam   1. foot deformity   No  2. Current or previous foot ulceration     No  3. Current or previous pre-ulcerative calluses     No  4. previous partial amputation of one or both feet or complete amputation of one foot     No  5. peripheral neuropathy with evidence of callus formation     No  6. poor circulation     No  Approval given for 3 pairs of diabetic shoes and inserts if patient desires.          Review of Systems   Constitutional, HEENT, cardiovascular, pulmonary, gi and gu systems are negative, except as otherwise noted.      Objective    /73 (BP Location: Right arm, Patient Position: Sitting, Cuff Size: Adult Large)   Pulse 61   Temp 97.4  F (36.3  C) (Tympanic)   Wt 82.6 kg (182 lb)   SpO2 96%   BMI 33.29 kg/m    Body mass index is 33.29 kg/m .  Physical Exam   GENERAL: healthy, alert and no distress  NECK: no adenopathy, no asymmetry, masses, or scars and thyroid normal to palpation  RESP: lungs clear to auscultation - no rales, rhonchi or wheezes  CV: regular rate and rhythm, normal S1 S2, no S3 or S4, no murmur, click or rub, no peripheral edema and peripheral pulses strong  ABDOMEN: soft, nontender, no hepatosplenomegaly, no masses and bowel sounds normal  MS: no gross musculoskeletal defects noted, no edema

## 2023-09-20 ENCOUNTER — OFFICE VISIT (OUTPATIENT)
Dept: FAMILY MEDICINE | Facility: OTHER | Age: 63
End: 2023-09-20
Attending: FAMILY MEDICINE
Payer: COMMERCIAL

## 2023-09-20 VITALS
SYSTOLIC BLOOD PRESSURE: 129 MMHG | TEMPERATURE: 97.4 F | DIASTOLIC BLOOD PRESSURE: 73 MMHG | BODY MASS INDEX: 33.29 KG/M2 | WEIGHT: 182 LBS | OXYGEN SATURATION: 96 % | HEART RATE: 61 BPM

## 2023-09-20 DIAGNOSIS — A69.20 LYME DISEASE: ICD-10-CM

## 2023-09-20 DIAGNOSIS — I10 BENIGN ESSENTIAL HYPERTENSION: ICD-10-CM

## 2023-09-20 DIAGNOSIS — Z79.4 TYPE 2 DIABETES MELLITUS WITHOUT COMPLICATION, WITH LONG-TERM CURRENT USE OF INSULIN (H): Primary | ICD-10-CM

## 2023-09-20 DIAGNOSIS — E11.9 TYPE 2 DIABETES MELLITUS WITHOUT COMPLICATION, WITH LONG-TERM CURRENT USE OF INSULIN (H): Primary | ICD-10-CM

## 2023-09-20 DIAGNOSIS — Z23 NEED FOR PROPHYLACTIC VACCINATION AND INOCULATION AGAINST INFLUENZA: ICD-10-CM

## 2023-09-20 PROCEDURE — 80048 BASIC METABOLIC PNL TOTAL CA: CPT | Performed by: FAMILY MEDICINE

## 2023-09-20 PROCEDURE — 90471 IMMUNIZATION ADMIN: CPT | Performed by: FAMILY MEDICINE

## 2023-09-20 PROCEDURE — 84443 ASSAY THYROID STIM HORMONE: CPT | Performed by: FAMILY MEDICINE

## 2023-09-20 PROCEDURE — 82043 UR ALBUMIN QUANTITATIVE: CPT | Performed by: FAMILY MEDICINE

## 2023-09-20 PROCEDURE — 82570 ASSAY OF URINE CREATININE: CPT | Performed by: FAMILY MEDICINE

## 2023-09-20 PROCEDURE — 83036 HEMOGLOBIN GLYCOSYLATED A1C: CPT | Performed by: FAMILY MEDICINE

## 2023-09-20 PROCEDURE — 90686 IIV4 VACC NO PRSV 0.5 ML IM: CPT | Performed by: FAMILY MEDICINE

## 2023-09-20 PROCEDURE — 36415 COLL VENOUS BLD VENIPUNCTURE: CPT | Performed by: FAMILY MEDICINE

## 2023-09-20 PROCEDURE — 99214 OFFICE O/P EST MOD 30 MIN: CPT | Mod: 25 | Performed by: FAMILY MEDICINE

## 2023-09-20 ASSESSMENT — PAIN SCALES - GENERAL: PAINLEVEL: NO PAIN (0)

## 2023-09-21 LAB
ANION GAP SERPL CALCULATED.3IONS-SCNC: 14 MMOL/L (ref 7–15)
BUN SERPL-MCNC: 7.8 MG/DL (ref 8–23)
CALCIUM SERPL-MCNC: 9.8 MG/DL (ref 8.8–10.2)
CHLORIDE SERPL-SCNC: 101 MMOL/L (ref 98–107)
CREAT SERPL-MCNC: 0.69 MG/DL (ref 0.51–0.95)
CREAT UR-MCNC: 221.4 MG/DL
DEPRECATED HCO3 PLAS-SCNC: 25 MMOL/L (ref 22–29)
EGFRCR SERPLBLD CKD-EPI 2021: >90 ML/MIN/1.73M2
EST. AVERAGE GLUCOSE BLD GHB EST-MCNC: 143 MG/DL
GLUCOSE SERPL-MCNC: 107 MG/DL (ref 70–99)
HBA1C MFR BLD: 6.6 %
MICROALBUMIN UR-MCNC: 20.8 MG/L
MICROALBUMIN/CREAT UR: 9.39 MG/G CR (ref 0–25)
POTASSIUM SERPL-SCNC: 4.4 MMOL/L (ref 3.4–5.3)
SODIUM SERPL-SCNC: 140 MMOL/L (ref 136–145)
TSH SERPL DL<=0.005 MIU/L-ACNC: 2.9 UIU/ML (ref 0.3–4.2)

## 2023-10-13 DIAGNOSIS — I10 BENIGN ESSENTIAL HYPERTENSION: ICD-10-CM

## 2023-10-13 NOTE — TELEPHONE ENCOUNTER
Furosemide (Lasix) 40 MG tablet     Last Written Prescription Date:  10/26/2022  Last Fill Quantity: 180,   # refills: 3  Last Office Visit: 09/20/2023

## 2023-10-16 ENCOUNTER — ANCILLARY PROCEDURE (OUTPATIENT)
Dept: MAMMOGRAPHY | Facility: OTHER | Age: 63
End: 2023-10-16
Attending: FAMILY MEDICINE
Payer: COMMERCIAL

## 2023-10-16 ENCOUNTER — TELEPHONE (OUTPATIENT)
Dept: MAMMOGRAPHY | Facility: OTHER | Age: 63
End: 2023-10-16

## 2023-10-16 DIAGNOSIS — Z12.31 VISIT FOR SCREENING MAMMOGRAM: ICD-10-CM

## 2023-10-16 PROCEDURE — 77067 SCR MAMMO BI INCL CAD: CPT | Mod: TC | Performed by: RADIOLOGY

## 2023-10-16 PROCEDURE — 77063 BREAST TOMOSYNTHESIS BI: CPT | Mod: TC | Performed by: RADIOLOGY

## 2023-10-17 RX ORDER — FUROSEMIDE 40 MG
40 TABLET ORAL 2 TIMES DAILY
Qty: 180 TABLET | Refills: 2 | Status: SHIPPED | OUTPATIENT
Start: 2023-10-17 | End: 2024-06-20

## 2023-11-11 ENCOUNTER — HEALTH MAINTENANCE LETTER (OUTPATIENT)
Age: 63
End: 2023-11-11

## 2023-11-30 ENCOUNTER — MYC MEDICAL ADVICE (OUTPATIENT)
Dept: FAMILY MEDICINE | Facility: OTHER | Age: 63
End: 2023-11-30

## 2023-11-30 DIAGNOSIS — E11.9 TYPE 2 DIABETES MELLITUS WITHOUT COMPLICATION, WITH LONG-TERM CURRENT USE OF INSULIN (H): ICD-10-CM

## 2023-11-30 DIAGNOSIS — Z79.4 TYPE 2 DIABETES MELLITUS WITHOUT COMPLICATION, WITH LONG-TERM CURRENT USE OF INSULIN (H): ICD-10-CM

## 2023-11-30 RX ORDER — INSULIN GLARGINE 100 [IU]/ML
INJECTION, SOLUTION SUBCUTANEOUS
Qty: 15 ML | Refills: 3 | Status: SHIPPED | OUTPATIENT
Start: 2023-11-30 | End: 2024-01-18

## 2023-11-30 NOTE — TELEPHONE ENCOUNTER
Pharmacy comment: Alternative Requested:THE PRESCRIBED MEDICATION IS NOT COVERED BY INSURANCE. PLEASE CONSIDER CHANGING TO ONE OF THE SUGGESTED COVERED ALTERNATIVES.

## 2023-12-03 DIAGNOSIS — E11.65 TYPE 2 DIABETES MELLITUS WITH HYPERGLYCEMIA, WITH LONG-TERM CURRENT USE OF INSULIN (H): ICD-10-CM

## 2023-12-03 DIAGNOSIS — Z79.4 TYPE 2 DIABETES MELLITUS WITH HYPERGLYCEMIA, WITH LONG-TERM CURRENT USE OF INSULIN (H): ICD-10-CM

## 2023-12-04 RX ORDER — SEMAGLUTIDE 0.68 MG/ML
INJECTION, SOLUTION SUBCUTANEOUS
Qty: 3 ML | Refills: 5 | Status: SHIPPED | OUTPATIENT
Start: 2023-12-04 | End: 2024-05-22

## 2024-01-18 ENCOUNTER — MYC MEDICAL ADVICE (OUTPATIENT)
Dept: FAMILY MEDICINE | Facility: OTHER | Age: 64
End: 2024-01-18

## 2024-01-18 DIAGNOSIS — Z79.4 TYPE 2 DIABETES MELLITUS WITHOUT COMPLICATION, WITH LONG-TERM CURRENT USE OF INSULIN (H): ICD-10-CM

## 2024-01-18 DIAGNOSIS — E11.9 TYPE 2 DIABETES MELLITUS WITHOUT COMPLICATION, WITH LONG-TERM CURRENT USE OF INSULIN (H): ICD-10-CM

## 2024-01-18 RX ORDER — INSULIN GLARGINE 100 [IU]/ML
INJECTION, SOLUTION SUBCUTANEOUS
Qty: 15 ML | Refills: 3 | Status: SHIPPED | OUTPATIENT
Start: 2024-01-18 | End: 2024-05-17

## 2024-01-20 ENCOUNTER — HEALTH MAINTENANCE LETTER (OUTPATIENT)
Age: 64
End: 2024-01-20

## 2024-01-22 NOTE — PROGRESS NOTES
"  Assessment & Plan     Type 2 diabetes mellitus without complication, with long-term current use of insulin (H)  Doing great.  Winter is a little tougher.  Update labs and follow.,   - Basic metabolic panel; Future  - Hemoglobin A1c; Future  - NC FOOT EXAM NO CHARGE    Benign essential hypertension  Stable.     Lymphedema  Stable no change.              BMI  Estimated body mass index is 34.37 kg/m  as calculated from the following:    Height as of this encounter: 1.575 m (5' 2\").    Weight as of this encounter: 85.2 kg (187 lb 14.4 oz).           No follow-ups on file.    Jackie Almonte is a 63 year old, presenting for the following health issues:  Diabetes    HPI     Diabetes Follow-up    How often are you checking your blood sugar? Two times daily  Blood sugar testing frequency justification:  Uncontrolled diabetes and Risk of hypoglycemia with medication(s)  What time of day are you checking your blood sugars (select all that apply)?  Before meals  Have you had any blood sugars above 200?  Yes   Have you had any blood sugars below 70?  Yes, 45 this morning  What symptoms do you notice when your blood sugar is low?  Shaky, Dizzy, and Weak  What concerns do you have today about your diabetes? None   Do you have any of these symptoms? (Select all that apply)  No numbness or tingling in feet.  No redness, sores or blisters on feet.  No complaints of excessive thirst.  No reports of blurry vision.  No significant changes to weight.      BP Readings from Last 2 Encounters:   01/24/24 136/70   09/20/23 129/73     Hemoglobin A1C (%)   Date Value   09/20/2023 6.6 (H)   06/07/2023 6.9 (H)   06/23/2021 7.8 (H)   09/24/2020 6.4 (H)     LDL Cholesterol Calculated (mg/dL)   Date Value   06/07/2023 68   06/13/2022 63   06/23/2021 74   06/19/2020 73             Hypertension Follow-up    Do you check your blood pressure regularly outside of the clinic? No   Are you following a low salt diet? Yes  Are your blood pressures ever " "more than 140 on the top number (systolic) OR more   than 90 on the bottom number (diastolic), for example 140/90? N/a, doesn't check it  Diabetic foot exam   1. foot deformity   Yes  2. Current or previous foot ulceration     No  3. Current or previous pre-ulcerative calluses     Yes  4. previous partial amputation of one or both feet or complete amputation of one foot     No  5. peripheral neuropathy with evidence of callus formation     No  6. poor circulation     No  Approval given for 3 pairs of diabetic shoes and inserts if patient desires.        Review of Systems  Constitutional, HEENT, cardiovascular, pulmonary, gi and gu systems are negative, except as otherwise noted.      Objective    /70 (BP Location: Right arm, Patient Position: Sitting, Cuff Size: Adult Regular)   Pulse 65   Temp 97.4  F (36.3  C) (Tympanic)   Ht 1.575 m (5' 2\")   Wt 85.2 kg (187 lb 14.4 oz)   SpO2 98%   BMI 34.37 kg/m    Body mass index is 34.37 kg/m .  Physical Exam   GENERAL: alert and no distress  EYES: Eyes grossly normal to inspection, PERRL and conjunctivae and sclerae normal  HENT: ear canals and TM's normal, nose and mouth without ulcers or lesions  NECK: no adenopathy, no asymmetry, masses, or scars  RESP: lungs clear to auscultation - no rales, rhonchi or wheezes  CV: regular rate and rhythm, normal S1 S2, no S3 or S4, no murmur, click or rub, no peripheral edema  ABDOMEN: soft, nontender, no hepatosplenomegaly, no masses and bowel sounds normal  MS: no gross musculoskeletal defects noted, no edema            Signed Electronically by: Nathanael Mena MD    "

## 2024-01-24 ENCOUNTER — OFFICE VISIT (OUTPATIENT)
Dept: FAMILY MEDICINE | Facility: OTHER | Age: 64
End: 2024-01-24
Attending: FAMILY MEDICINE
Payer: COMMERCIAL

## 2024-01-24 VITALS
HEART RATE: 65 BPM | OXYGEN SATURATION: 98 % | SYSTOLIC BLOOD PRESSURE: 136 MMHG | DIASTOLIC BLOOD PRESSURE: 70 MMHG | HEIGHT: 62 IN | BODY MASS INDEX: 34.58 KG/M2 | TEMPERATURE: 97.4 F | WEIGHT: 187.9 LBS

## 2024-01-24 DIAGNOSIS — E11.9 TYPE 2 DIABETES MELLITUS WITHOUT COMPLICATION, WITH LONG-TERM CURRENT USE OF INSULIN (H): Primary | ICD-10-CM

## 2024-01-24 DIAGNOSIS — Z79.4 TYPE 2 DIABETES MELLITUS WITHOUT COMPLICATION, WITH LONG-TERM CURRENT USE OF INSULIN (H): Primary | ICD-10-CM

## 2024-01-24 DIAGNOSIS — I10 BENIGN ESSENTIAL HYPERTENSION: ICD-10-CM

## 2024-01-24 DIAGNOSIS — I89.0 LYMPHEDEMA: ICD-10-CM

## 2024-01-24 LAB
ANION GAP SERPL CALCULATED.3IONS-SCNC: 13 MMOL/L (ref 7–15)
BUN SERPL-MCNC: 9.1 MG/DL (ref 8–23)
CALCIUM SERPL-MCNC: 9.8 MG/DL (ref 8.8–10.2)
CHLORIDE SERPL-SCNC: 100 MMOL/L (ref 98–107)
CREAT SERPL-MCNC: 0.75 MG/DL (ref 0.51–0.95)
DEPRECATED HCO3 PLAS-SCNC: 29 MMOL/L (ref 22–29)
EGFRCR SERPLBLD CKD-EPI 2021: 90 ML/MIN/1.73M2
EST. AVERAGE GLUCOSE BLD GHB EST-MCNC: 154 MG/DL
GLUCOSE SERPL-MCNC: 62 MG/DL (ref 70–99)
HBA1C MFR BLD: 7 %
POTASSIUM SERPL-SCNC: 4.3 MMOL/L (ref 3.4–5.3)
SODIUM SERPL-SCNC: 142 MMOL/L (ref 135–145)

## 2024-01-24 PROCEDURE — 36415 COLL VENOUS BLD VENIPUNCTURE: CPT | Performed by: FAMILY MEDICINE

## 2024-01-24 PROCEDURE — 99207 PR FOOT EXAM NO CHARGE: CPT | Performed by: FAMILY MEDICINE

## 2024-01-24 PROCEDURE — 83036 HEMOGLOBIN GLYCOSYLATED A1C: CPT | Performed by: FAMILY MEDICINE

## 2024-01-24 PROCEDURE — 99214 OFFICE O/P EST MOD 30 MIN: CPT | Performed by: FAMILY MEDICINE

## 2024-01-24 PROCEDURE — 80048 BASIC METABOLIC PNL TOTAL CA: CPT | Performed by: FAMILY MEDICINE

## 2024-01-24 ASSESSMENT — PAIN SCALES - GENERAL: PAINLEVEL: MILD PAIN (2)

## 2024-02-25 DIAGNOSIS — N95.2 VAGINAL ATROPHY: ICD-10-CM

## 2024-02-26 RX ORDER — ESTRADIOL 0.1 MG/G
2 CREAM VAGINAL
Qty: 42.5 G | Refills: 0 | Status: SHIPPED | OUTPATIENT
Start: 2024-02-26 | End: 2024-04-05

## 2024-02-26 NOTE — TELEPHONE ENCOUNTER
Estradiol  Last Written Prescription Date: 8/23/23  Last Fill Quantity: 42.5 g # of Refills: 3  Last Office Visit: 1/24/24

## 2024-04-04 DIAGNOSIS — N95.2 VAGINAL ATROPHY: ICD-10-CM

## 2024-04-05 RX ORDER — ESTRADIOL 0.1 MG/G
2 CREAM VAGINAL
Qty: 42.5 G | Refills: 0 | Status: SHIPPED | OUTPATIENT
Start: 2024-04-05 | End: 2024-05-07

## 2024-04-05 NOTE — TELEPHONE ENCOUNTER
Estradiol (Estrace) 0.1 MG/GM vaginal cream  Last Written Prescription Date:  02/26/2024  Last Fill Quantity: 42.5,   # refills: 0  Last Office Visit: 01/24/2024

## 2024-04-28 ENCOUNTER — APPOINTMENT (OUTPATIENT)
Dept: GENERAL RADIOLOGY | Facility: HOSPITAL | Age: 64
End: 2024-04-28
Attending: PHYSICIAN ASSISTANT
Payer: COMMERCIAL

## 2024-04-28 ENCOUNTER — HOSPITAL ENCOUNTER (EMERGENCY)
Facility: HOSPITAL | Age: 64
Discharge: HOME | End: 2024-04-28
Attending: PHYSICIAN ASSISTANT | Admitting: PHYSICIAN ASSISTANT
Payer: COMMERCIAL

## 2024-04-28 VITALS
WEIGHT: 191.8 LBS | BODY MASS INDEX: 35.3 KG/M2 | OXYGEN SATURATION: 95 % | HEIGHT: 62 IN | SYSTOLIC BLOOD PRESSURE: 177 MMHG | DIASTOLIC BLOOD PRESSURE: 67 MMHG | RESPIRATION RATE: 17 BRPM | TEMPERATURE: 99.2 F | HEART RATE: 64 BPM

## 2024-04-28 DIAGNOSIS — J20.9 ACUTE BRONCHITIS WITH SYMPTOMS > 10 DAYS: ICD-10-CM

## 2024-04-28 LAB
ALBUMIN SERPL BCG-MCNC: 4.4 G/DL (ref 3.5–5.2)
ALP SERPL-CCNC: 103 U/L (ref 40–150)
ALT SERPL W P-5'-P-CCNC: 22 U/L (ref 0–50)
ANION GAP SERPL CALCULATED.3IONS-SCNC: 14 MMOL/L (ref 7–15)
AST SERPL W P-5'-P-CCNC: 27 U/L (ref 0–45)
BASOPHILS # BLD AUTO: 0.1 10E3/UL (ref 0–0.2)
BASOPHILS NFR BLD AUTO: 1 %
BILIRUB SERPL-MCNC: 0.4 MG/DL
BUN SERPL-MCNC: 10.4 MG/DL (ref 8–23)
CALCIUM SERPL-MCNC: 9.4 MG/DL (ref 8.8–10.2)
CHLORIDE SERPL-SCNC: 98 MMOL/L (ref 98–107)
CREAT SERPL-MCNC: 0.74 MG/DL (ref 0.51–0.95)
DEPRECATED HCO3 PLAS-SCNC: 27 MMOL/L (ref 22–29)
EGFRCR SERPLBLD CKD-EPI 2021: >90 ML/MIN/1.73M2
EOSINOPHIL # BLD AUTO: 0.2 10E3/UL (ref 0–0.7)
EOSINOPHIL NFR BLD AUTO: 2 %
ERYTHROCYTE [DISTWIDTH] IN BLOOD BY AUTOMATED COUNT: 14.1 % (ref 10–15)
GLUCOSE SERPL-MCNC: 247 MG/DL (ref 70–99)
HCT VFR BLD AUTO: 39.6 % (ref 35–47)
HGB BLD-MCNC: 13.1 G/DL (ref 11.7–15.7)
IMM GRANULOCYTES # BLD: 0.1 10E3/UL
IMM GRANULOCYTES NFR BLD: 1 %
LYMPHOCYTES # BLD AUTO: 4 10E3/UL (ref 0.8–5.3)
LYMPHOCYTES NFR BLD AUTO: 37 %
MCH RBC QN AUTO: 29.2 PG (ref 26.5–33)
MCHC RBC AUTO-ENTMCNC: 33.1 G/DL (ref 31.5–36.5)
MCV RBC AUTO: 88 FL (ref 78–100)
MONOCYTES # BLD AUTO: 0.6 10E3/UL (ref 0–1.3)
MONOCYTES NFR BLD AUTO: 6 %
NEUTROPHILS # BLD AUTO: 5.8 10E3/UL (ref 1.6–8.3)
NEUTROPHILS NFR BLD AUTO: 54 %
NRBC # BLD AUTO: 0 10E3/UL
NRBC BLD AUTO-RTO: 0 /100
PLATELET # BLD AUTO: 403 10E3/UL (ref 150–450)
POTASSIUM SERPL-SCNC: 3.5 MMOL/L (ref 3.4–5.3)
PROT SERPL-MCNC: 7.3 G/DL (ref 6.4–8.3)
RBC # BLD AUTO: 4.48 10E6/UL (ref 3.8–5.2)
SODIUM SERPL-SCNC: 139 MMOL/L (ref 135–145)
WBC # BLD AUTO: 10.8 10E3/UL (ref 4–11)

## 2024-04-28 PROCEDURE — 99213 OFFICE O/P EST LOW 20 MIN: CPT | Performed by: PHYSICIAN ASSISTANT

## 2024-04-28 PROCEDURE — 80053 COMPREHEN METABOLIC PANEL: CPT | Performed by: PHYSICIAN ASSISTANT

## 2024-04-28 PROCEDURE — G0463 HOSPITAL OUTPT CLINIC VISIT: HCPCS | Mod: 25

## 2024-04-28 PROCEDURE — 36415 COLL VENOUS BLD VENIPUNCTURE: CPT | Performed by: PHYSICIAN ASSISTANT

## 2024-04-28 PROCEDURE — 71046 X-RAY EXAM CHEST 2 VIEWS: CPT

## 2024-04-28 PROCEDURE — 85025 COMPLETE CBC W/AUTO DIFF WBC: CPT | Performed by: PHYSICIAN ASSISTANT

## 2024-04-28 RX ORDER — ALBUTEROL SULFATE 90 UG/1
2 AEROSOL, METERED RESPIRATORY (INHALATION) EVERY 6 HOURS PRN
Qty: 18 G | Refills: 0 | Status: SHIPPED | OUTPATIENT
Start: 2024-04-28

## 2024-04-28 RX ORDER — AZITHROMYCIN 250 MG/1
TABLET, FILM COATED ORAL
Qty: 6 TABLET | Refills: 0 | Status: SHIPPED | OUTPATIENT
Start: 2024-04-28 | End: 2024-05-03

## 2024-04-28 ASSESSMENT — ACTIVITIES OF DAILY LIVING (ADL): ADLS_ACUITY_SCORE: 37

## 2024-04-28 NOTE — ED TRIAGE NOTES
Pt presents with c/o cough. Reports it started a week and a half ago with a cough and has fevers only at night. Pt states she also noticed SOB when she walks. Pt has been taking tylenol and mucinex DM. Pt has had negative at home covid tests. Pt refuses multiplex testing at this time.

## 2024-04-28 NOTE — ED PROVIDER NOTES
"  History     Chief Complaint   Patient presents with    Cough    Headache    Fever     HPI  Suzy Gallardo is a 63 year old female who presents with 10 day h/o productive cough, fevers, and shortness of breath with activity. She has been taking Mucinex OTC without improvement. Denies any difficulty breathing. States she can hear \"rattling\" in her chest.     Allergies:  No Known Allergies    Problem List:    Patient Active Problem List    Diagnosis Date Noted    Abnormal stress test 12/17/2018     Priority: Medium    Chest discomfort 12/17/2018     Priority: Medium    Benign essential hypertension 12/17/2018     Priority: Medium    Hepatic steatosis 12/17/2018     Priority: Medium    Anxiety 12/17/2018     Priority: Medium    Elevated liver function tests 04/17/2018     Priority: Medium    ACP (advance care planning) 08/03/2016     Priority: Medium     Advance Care Planning 8/3/2016: ACP Review of Chart / Resources Provided:  Reviewed chart for advance care plan.  Suzy Gallardo has been provided information and resources to begin or update their advance care plan.  Added by Sisi Link              Type 2 diabetes mellitus with hyperglycemia, with long-term current use of insulin (H)      Priority: Medium    Depression 04/13/2016     Priority: Medium    Mixed hyperlipidemia 04/13/2016     Priority: Medium    Status post hysterectomy 01/13/2015     Priority: Medium    Annual physical exam 01/07/2014     Priority: Medium    Morbid obesity (H) 01/07/2014     Priority: Medium     Overview:   Initial visit 4/13/16 initial  weight  198  Goal weight 150 BMI of 25     BMR 1460  Low activity  1752 Light activity 2007 Moderate activity 2263  High activity 2518    Calorie goal: 1200 to 1400   Carb goal :50 gm net  Protein goal: 75      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 Provider to review      Lymphedema 03/01/2012     Priority: Medium     " Nathanael Mena MD   Formatting of this note might be different from the original.  Milroys disease increased fluid retention with any weight bearing exercise          Past Medical History:    Past Medical History:   Diagnosis Date    Anemia, unspecified 06/23/2004    Depressive disorder 2006    Depressive disorder, not elsewhere classified 01/19/2004    Diabetes mellitus without mention of complication, 11/30/2001    Endometriosis of uterus 12/19/2001    History of blood transfusion     [place] disease 03/01/2012    Other (abnormal) findings on radiological examination of breast 08/15/2002    Pure hypercholesterolemia 11/26/1999    Sterilization 12/04/2001       Past Surgical History:    Past Surgical History:   Procedure Laterality Date    ABDOMEN SURGERY      Total hysterectomy    BIOPSY      Rt breast and left hand    BREAST SURGERY Right 2013    breast biopsy    COLONOSCOPY  05/03/2013    Procedure: COLONOSCOPY;  COLONOSCOPY;  Surgeon: Pablito Urbina MD;  Location: [facility]    COSMETIC SURGERY  1974    Remove birthmark on nose    ENDOMETRIAL SAMPLING (BIOPSY)      LAPAROSCOPY      NOSE SURGERY      birth edilberto removed    ORTHOPEDIC SURGERY  12/10/21    Frozen right shoulder    wisdom teeth removed      ZZC TOTAL ABDOM HYSTERECTOMY  07/01/2009    Dr Martinez, no cervix       Family History:    Family History   Problem Relation Age of Onset    Stomach Cancer Brother     Diabetes Brother     Hypertension Brother     Hyperlipidemia Brother     Other Cancer Brother         Stomach- 1994    C.A.D. Father 40        s/p quad bypass    Diabetes Father     Coronary Artery Disease Father     Hypertension Father     Hyperlipidemia Father     Obesity Father     Diabetes Type 2  Brother     Hypertension Brother     Hypertension Other     Chronic Obstructive Pulmonary Disease Mother     Lung Cancer Mother     Bladder Cancer Mother     Hypertension Mother     Hyperlipidemia Mother     Cerebrovascular Disease Mother     Anesthesia  "Reaction Mother     Thyroid Disease Mother     Cerebrovascular Disease Maternal Grandfather     Cerebrovascular Disease Maternal Grandmother     Diabetes Paternal Grandmother     Coronary Artery Disease Paternal Grandmother     Hypertension Brother     Obesity Brother        Social History:  Marital Status:   [2]  Social History     Tobacco Use    Smoking status: Never    Smokeless tobacco: Never   Vaping Use    Vaping status: Never Used   Substance Use Topics    Alcohol use: No    Drug use: No        Medications:    albuterol (PROAIR HFA/PROVENTIL HFA/VENTOLIN HFA) 108 (90 Base) MCG/ACT inhaler  ASPIRIN PO  azithromycin (ZITHROMAX Z-KHURRAM) 250 MG tablet  B-D U/F 31G X 8 MM insulin pen needle  calcium-vitamin D (CALTRATE) 600-400 MG-UNIT per tablet  cyanocobalamin (VITAMIN B-12) 1000 MCG tablet  estradiol (ESTRACE) 0.1 MG/GM vaginal cream  furosemide (LASIX) 40 MG tablet  IBUPROFEN PO  insulin glargine 100 UNIT/ML pen  Lancets (ONETOUCH DELICA PLUS [id number]) MISC  metFORMIN (GLUCOPHAGE) 1000 MG tablet  ONETOUCH VERIO IQ test strip  OZEMPIC, 0.25 OR 0.5 MG/DOSE, 2 MG/3ML pen  simvastatin (ZOCOR) 40 MG tablet  sulfamethoxazole-trimethoprim (BACTRIM DS) 800-160 MG tablet  venlafaxine (EFFEXOR XR) 150 MG 24 hr capsule  Vitamin D (Cholecalciferol) 25 MCG (1000 UT) TABS          Review of Systems   All other systems reviewed and are negative.      Physical Exam   BP: 177/67  Pulse: 64  Temp: 99.2  F (37.3  C)  Resp: 17  Height: 157.5 cm (5' 2\")  Weight: 87 kg (191 lb 12.8 oz)  SpO2: 95 %      Physical Exam  Vitals and nursing note reviewed.   Constitutional:       General: She is not in acute distress.     Appearance: She is well-developed. She is not diaphoretic.   HENT:      Head: Normocephalic and atraumatic.      Right Ear: External ear normal.      Left Ear: External ear normal.      Nose: Nose normal.      Mouth/Throat:      Pharynx: No oropharyngeal exudate.   Eyes:      General: No scleral icterus.        " Right eye: No discharge.         Left eye: No discharge.      Conjunctiva/sclera: Conjunctivae normal.      Pupils: Pupils are equal, round, and reactive to light.   Cardiovascular:      Rate and Rhythm: Normal rate and regular rhythm.      Heart sounds: Normal heart sounds. No murmur heard.     No friction rub. No gallop.   Pulmonary:      Effort: Pulmonary effort is normal. No respiratory distress.      Breath sounds: Rhonchi (left) present. No wheezing or rales.   Chest:      Chest wall: No tenderness.   Abdominal:      General: Bowel sounds are normal.      Palpations: Abdomen is soft.      Tenderness: There is no abdominal tenderness.   Musculoskeletal:      Cervical back: Normal range of motion and neck supple.   Lymphadenopathy:      Cervical: No cervical adenopathy.   Skin:     General: Skin is warm and dry.      Coloration: Skin is not pale.      Findings: No erythema or rash.   Neurological:      Mental Status: She is alert and oriented to person, place, and time.      Cranial Nerves: No cranial nerve deficit.      Coordination: Coordination normal.   Psychiatric:         Behavior: Behavior normal.         Thought Content: Thought content normal.         Judgment: Judgment normal.         ED Course        Procedures            Results for orders placed or performed during the hospital encounter of 04/28/24 (from the past 24 hour(s))   CBC with platelets differential    Narrative    The following orders were created for panel order CBC with platelets differential.  Procedure                               Abnormality         Status                     ---------                               -----------         ------                     CBC with platelets and d...[[id number]]                      Final result                 Please view results for these tests on the individual orders.   Comprehensive metabolic panel   Result Value Ref Range    Sodium 139 135 - 145 mmol/L    Potassium 3.5 3.4 - 5.3 mmol/L     Carbon Dioxide (CO2) 27 22 - 29 mmol/L    Anion Gap 14 7 - 15 mmol/L    Urea Nitrogen 10.4 8.0 - 23.0 mg/dL    Creatinine 0.74 0.51 - 0.95 mg/dL    GFR Estimate >90 >60 mL/min/1.73m2    Calcium 9.4 8.8 - 10.2 mg/dL    Chloride 98 98 - 107 mmol/L    Glucose 247 (H) 70 - 99 mg/dL    Alkaline Phosphatase 103 40 - 150 U/L    AST 27 0 - 45 U/L    ALT 22 0 - 50 U/L    Protein Total 7.3 6.4 - 8.3 g/dL    Albumin 4.4 3.5 - 5.2 g/dL    Bilirubin Total 0.4 <=1.2 mg/dL   CBC with platelets and differential   Result Value Ref Range    WBC Count 10.8 4.0 - 11.0 10e3/uL    RBC Count 4.48 3.80 - 5.20 10e6/uL    Hemoglobin 13.1 11.7 - 15.7 g/dL    Hematocrit 39.6 35.0 - 47.0 %    MCV 88 78 - 100 fL    MCH 29.2 26.5 - 33.0 pg    MCHC 33.1 31.5 - 36.5 g/dL    RDW 14.1 10.0 - 15.0 %    Platelet Count 403 150 - 450 10e3/uL    % Neutrophils 54 %    % Lymphocytes 37 %    % Monocytes 6 %    % Eosinophils 2 %    % Basophils 1 %    % Immature Granulocytes 1 %    NRBCs per 100 WBC 0 <1 /100    Absolute Neutrophils 5.8 1.6 - 8.3 10e3/uL    Absolute Lymphocytes 4.0 0.8 - 5.3 10e3/uL    Absolute Monocytes 0.6 0.0 - 1.3 10e3/uL    Absolute Eosinophils 0.2 0.0 - 0.7 10e3/uL    Absolute Basophils 0.1 0.0 - 0.2 10e3/uL    Absolute Immature Granulocytes 0.1 <=0.4 10e3/uL    Absolute NRBCs 0.0 10e3/uL   Chest XR,  PA & LAT    Narrative    XR CHEST 2 VIEWS    HISTORY: 63 years Female cough, dyspnea, rhonchi on the left    COMPARISON: None    TECHNIQUE: 2 views of the chest were obtained.    FINDINGS: Two views of the chest were obtained. Heart size and  pulmonary vascularity are within normal limits, lungs are clear on  both views. No consolidating air space opacities are present.          Impression    IMPRESSION: Clear chest.    ERIN MERINO MD         SYSTEM ID:  [id number]       Medications - No data to display    Assessments & Plan (with Medical Decision Making)   Pt is non-toxic appearing and in NAD. No respiratory distress. She has coarse  rhonchi on the left. CXR is clear. Labs unremarkable other than elevated blood glucose for which she will given herself her corrective insulin dose when she gets home. Will prescribed Zpak and Albuterol inhaler for bronchitis. Holding off on prednisone with T2DM with elevated glucose. She was discharged home following in stable condition.     Plan:  Take the ZPak as prescribed for your bronchitis.   Use the albuterol inhaler as prescribed for wheezing/tighness.   Continue OTC Mucinex as directed.   Alternate between Ibuprofen and Tylenol every 4 hours for fever control.  Increase fluids and rest.  Use a humidifier at night.  Return here with any difficulty breathing or new/concerning symptoms.       I have reviewed the nursing notes.    I have reviewed the findings, diagnosis, plan and need for follow up with the patient.      New Prescriptions    ALBUTEROL (PROAIR HFA/PROVENTIL HFA/VENTOLIN HFA) 108 (90 BASE) MCG/ACT INHALER    Inhale 2 puffs into the lungs every 6 hours as needed for shortness of breath, wheezing or cough    AZITHROMYCIN (ZITHROMAX Z-KHURRAM) 250 MG TABLET    Two tablets on the first day, then one tablet daily for the next 4 days       Final diagnoses:   Acute bronchitis with symptoms > 10 days       4/28/2024   [facility]

## 2024-04-28 NOTE — DISCHARGE INSTRUCTIONS
Take the ZPak as prescribed for your bronchitis.   Use the albuterol inhaler as prescribed for wheezing/tighness.   Continue OTC Mucinex as directed.   Alternate between Ibuprofen and Tylenol every 4 hours for fever control.  Increase fluids and rest.  Use a humidifier at night.  Return here with any difficulty breathing or new/concerning symptoms.

## 2024-04-29 ENCOUNTER — TELEPHONE (OUTPATIENT)
Dept: FAMILY MEDICINE | Facility: OTHER | Age: 64
End: 2024-04-29

## 2024-04-29 NOTE — TELEPHONE ENCOUNTER
9:33 AM    Reason for Call: OVERBOOK    Patient is having the following symptoms: Urgent care follow up Bronchitis .    The patient is requesting an appointment for 1 week with Dr. Mena.    Was an appointment offered for this call? No  If yes : Appointment type              Date    Preferred method for responding to this message: Telephone Call  What is your phone number ? [phone]      If we cannot reach you directly, may we leave a detailed response at the number you provided? Yes    Can this message wait until your PCP/provider returns, if unavailable today? Not applicable    Odette Johnson

## 2024-05-05 ASSESSMENT — PATIENT HEALTH QUESTIONNAIRE - PHQ9: SUM OF ALL RESPONSES TO PHQ QUESTIONS 1-9: 4

## 2024-05-13 ASSESSMENT — PATIENT HEALTH QUESTIONNAIRE - PHQ9
10. IF YOU CHECKED OFF ANY PROBLEMS, HOW DIFFICULT HAVE THESE PROBLEMS MADE IT FOR YOU TO DO YOUR WORK, TAKE CARE OF THINGS AT HOME, OR GET ALONG WITH OTHER PEOPLE: NOT DIFFICULT AT ALL
SUM OF ALL RESPONSES TO PHQ QUESTIONS 1-9: 4

## 2024-05-22 DIAGNOSIS — Z79.4 TYPE 2 DIABETES MELLITUS WITH HYPERGLYCEMIA, WITH LONG-TERM CURRENT USE OF INSULIN (H): ICD-10-CM

## 2024-05-22 DIAGNOSIS — F34.1 DYSTHYMIC DISORDER: ICD-10-CM

## 2024-05-22 DIAGNOSIS — E11.65 TYPE 2 DIABETES MELLITUS WITH HYPERGLYCEMIA, WITH LONG-TERM CURRENT USE OF INSULIN (H): ICD-10-CM

## 2024-05-22 DIAGNOSIS — E78.2 MIXED HYPERLIPIDEMIA: ICD-10-CM

## 2024-05-22 RX ORDER — SEMAGLUTIDE 0.68 MG/ML
INJECTION, SOLUTION SUBCUTANEOUS
Qty: 3 ML | Refills: 5 | Status: SHIPPED | OUTPATIENT
Start: 2024-05-22

## 2024-05-22 RX ORDER — SIMVASTATIN 40 MG
40 TABLET ORAL AT BEDTIME
Qty: 90 TABLET | Refills: 3 | Status: SHIPPED | OUTPATIENT
Start: 2024-05-22

## 2024-05-22 RX ORDER — VENLAFAXINE HYDROCHLORIDE 150 MG/1
CAPSULE, EXTENDED RELEASE ORAL
Qty: 90 CAPSULE | Refills: 3 | Status: SHIPPED | OUTPATIENT
Start: 2024-05-22

## 2024-05-22 NOTE — TELEPHONE ENCOUNTER
Disp Refills Start End SARINA   OZEMPIC, 0.25 OR 0.5 MG/DOSE, 2 MG/3ML pen 3 mL 5 12/4/2023 -- No      Disp Refills Start End SARINA   simvastatin (ZOCOR) 40 MG tablet 90 tablet 2 9/1/2023 -- No      Disp Refills Start End SARINA   venlafaxine (EFFEXOR XR) 150 MG 24 hr capsule 90 capsule 3 6/5/2023 -- No     Last Office Visit: 01/24/2024  Future Office visit:       Routing refill request to provider for review/approval because:

## 2024-05-23 DIAGNOSIS — Z79.4 TYPE 2 DIABETES MELLITUS WITHOUT COMPLICATION, WITH LONG-TERM CURRENT USE OF INSULIN (H): ICD-10-CM

## 2024-05-23 DIAGNOSIS — E11.9 TYPE 2 DIABETES MELLITUS WITHOUT COMPLICATION, WITH LONG-TERM CURRENT USE OF INSULIN (H): ICD-10-CM

## 2024-05-23 RX ORDER — BLOOD SUGAR DIAGNOSTIC
STRIP MISCELLANEOUS
Qty: 200 STRIP | Refills: 3 | Status: SHIPPED | OUTPATIENT
Start: 2024-05-23

## 2024-05-23 NOTE — TELEPHONE ENCOUNTER
Onetouch test strip  Last Written Prescription Date: 4/14/22  Last Fill Quantity: 200 # of Refills: 3  Last Office 1/24/24

## 2024-06-04 ENCOUNTER — OFFICE VISIT (OUTPATIENT)
Dept: FAMILY MEDICINE | Facility: OTHER | Age: 64
End: 2024-06-04
Attending: FAMILY MEDICINE
Payer: COMMERCIAL

## 2024-06-04 VITALS
TEMPERATURE: 98.2 F | BODY MASS INDEX: 34.2 KG/M2 | HEART RATE: 67 BPM | OXYGEN SATURATION: 98 % | WEIGHT: 187 LBS | DIASTOLIC BLOOD PRESSURE: 78 MMHG | SYSTOLIC BLOOD PRESSURE: 124 MMHG

## 2024-06-04 DIAGNOSIS — E11.9 TYPE 2 DIABETES MELLITUS WITHOUT COMPLICATION, WITH LONG-TERM CURRENT USE OF INSULIN (H): Primary | ICD-10-CM

## 2024-06-04 DIAGNOSIS — L03.90 CELLULITIS, UNSPECIFIED CELLULITIS SITE: ICD-10-CM

## 2024-06-04 DIAGNOSIS — Z79.4 TYPE 2 DIABETES MELLITUS WITHOUT COMPLICATION, WITH LONG-TERM CURRENT USE OF INSULIN (H): Primary | ICD-10-CM

## 2024-06-04 DIAGNOSIS — R52 BODY ACHES: ICD-10-CM

## 2024-06-04 DIAGNOSIS — J20.9 ACUTE BRONCHITIS, UNSPECIFIED ORGANISM: ICD-10-CM

## 2024-06-04 LAB
CHOLEST SERPL-MCNC: 138 MG/DL
EST. AVERAGE GLUCOSE BLD GHB EST-MCNC: 143 MG/DL
FASTING STATUS PATIENT QL REPORTED: YES
HBA1C MFR BLD: 6.6 %
HDLC SERPL-MCNC: 36 MG/DL
LDLC SERPL CALC-MCNC: 63 MG/DL
NONHDLC SERPL-MCNC: 102 MG/DL
TRIGL SERPL-MCNC: 197 MG/DL

## 2024-06-04 PROCEDURE — 36415 COLL VENOUS BLD VENIPUNCTURE: CPT | Performed by: FAMILY MEDICINE

## 2024-06-04 PROCEDURE — 99214 OFFICE O/P EST MOD 30 MIN: CPT | Performed by: FAMILY MEDICINE

## 2024-06-04 PROCEDURE — 80061 LIPID PANEL: CPT | Performed by: FAMILY MEDICINE

## 2024-06-04 PROCEDURE — 83036 HEMOGLOBIN GLYCOSYLATED A1C: CPT | Performed by: FAMILY MEDICINE

## 2024-06-04 RX ORDER — CEPHALEXIN 500 MG/1
500 CAPSULE ORAL 3 TIMES DAILY
Qty: 21 CAPSULE | Refills: 1 | Status: SHIPPED | OUTPATIENT
Start: 2024-06-04 | End: 2024-08-27

## 2024-06-04 RX ORDER — SULFAMETHOXAZOLE AND TRIMETHOPRIM 400; 80 MG/1; MG/1
1 TABLET ORAL 2 TIMES DAILY
Qty: 10 TABLET | Refills: 1 | Status: SHIPPED | OUTPATIENT
Start: 2024-06-04 | End: 2024-08-27

## 2024-06-04 RX ORDER — CEFDINIR 300 MG/1
300 CAPSULE ORAL 2 TIMES DAILY
Qty: 20 CAPSULE | Refills: 0 | Status: CANCELLED | OUTPATIENT
Start: 2024-06-04

## 2024-06-04 ASSESSMENT — PAIN SCALES - GENERAL: PAINLEVEL: NO PAIN (0)

## 2024-06-04 NOTE — PROGRESS NOTES
"  Assessment & Plan     Type 2 diabetes mellitus without complication, with long-term current use of insulin (H)  Sugars were up with this illness.  Update labs and follow..    - Lipid Profile; Future  - Hemoglobin A1c; Future  - Lipid Profile  - Hemoglobin A1c    Cellulitis, unspecified cellulitis site  History of.  None currently.  Refill abx to keep on hand as we always have.    - sulfamethoxazole-trimethoprim (BACTRIM) 400-80 MG tablet; Take 1 tablet by mouth 2 times daily  - cephALEXin (KEFLEX) 500 MG capsule; Take 1 capsule (500 mg) by mouth 3 times daily    Body aches  Improved.      Acute bronchitis, unspecified organism  Improved.  Nice review.  Normal oxygen.  Cough improving but still some residual.  Monitor and follow.          MED REC REQUIRED  Post Medication Reconciliation Status:   BMI  Estimated body mass index is 34.2 kg/m  as calculated from the following:    Height as of 4/28/24: 1.575 m (5' 2\").    Weight as of this encounter: 84.8 kg (187 lb).             No follow-ups on file.    Jackie Almonte is a 63 year old, presenting for the following health issues:  Diabetes and ER F/U        6/4/2024    10:27 AM   Additional Questions   Roomed by Diane   Accompanied by self     HPI     ED/UC Followup:    Facility:  [facility] [place], [place]  Date of visit: 4/28/24  Reason for visit: bronchitis   Current Status: improved       Diabetes Follow-up    How often are you checking your blood sugar? Three times daily  Blood sugar testing frequency justification:  Uncontrolled diabetes  What time of day are you checking your blood sugars (select all that apply)?  Before and after meals  Have you had any blood sugars above 200?  Yes   Have you had any blood sugars below 70?  Yes   What symptoms do you notice when your blood sugar is low?  Shaky and Weak  What concerns do you have today about your diabetes? Blood sugar is often over 200 and Low blood sugar   Do you have any of these symptoms? (Select all that apply)  No numbness or " tingling in feet.  No redness, sores or blisters on feet.  No complaints of excessive thirst.  No reports of blurry vision.  No significant changes to weight.      BP Readings from Last 2 Encounters:   06/04/24 124/78   04/28/24 177/67     Hemoglobin A1C (%)   Date Value   01/24/2024 7.0 (H)   09/20/2023 6.6 (H)   06/23/2021 7.8 (H)   09/24/2020 6.4 (H)     LDL Cholesterol Calculated (mg/dL)   Date Value   06/07/2023 68   06/13/2022 63   06/23/2021 74   06/19/2020 73             Review of Systems  Constitutional, HEENT, cardiovascular, pulmonary, gi and gu systems are negative, except as otherwise noted.      Objective    /78   Pulse 67   Temp 98.2  F (36.8  C) (Tympanic)   Wt 84.8 kg (187 lb)   SpO2 98%   BMI 34.20 kg/m    Body mass index is 34.2 kg/m .  Physical Exam   GENERAL: alert and no distress  EYES: Eyes grossly normal to inspection, PERRL and conjunctivae and sclerae normal  HENT: ear canals and TM's normal, nose and mouth without ulcers or lesions  NECK: no adenopathy, no asymmetry, masses, or scars  RESP: lungs clear to auscultation - no rales, rhonchi or wheezes  CV: regular rate and rhythm, normal S1 S2, no S3 or S4, no murmur, click or rub, no peripheral edema  ABDOMEN: soft, nontender, no hepatosplenomegaly, no masses and bowel sounds normal  MS: no gross musculoskeletal defects noted, no edema    Labs pending.          Signed Electronically by: Nathanael Mena MD

## 2024-06-19 DIAGNOSIS — I10 BENIGN ESSENTIAL HYPERTENSION: ICD-10-CM

## 2024-06-20 RX ORDER — FUROSEMIDE 40 MG
40 TABLET ORAL 2 TIMES DAILY
Qty: 180 TABLET | Refills: 3 | Status: SHIPPED | OUTPATIENT
Start: 2024-06-20

## 2024-06-21 DIAGNOSIS — Z79.4 TYPE 2 DIABETES MELLITUS WITHOUT COMPLICATION, WITH LONG-TERM CURRENT USE OF INSULIN (H): ICD-10-CM

## 2024-06-21 DIAGNOSIS — E11.9 TYPE 2 DIABETES MELLITUS WITHOUT COMPLICATION, WITH LONG-TERM CURRENT USE OF INSULIN (H): ICD-10-CM

## 2024-06-21 NOTE — TELEPHONE ENCOUNTER
Payal      Last Written Prescription Date:  5/17/24  Last Fill Quantity: 15mL,   # refills: 0  Last Office Visit: 6/4/24  Future Office visit:    Next 5 appointments (look out 90 days)      Aug 27, 2024 8:30 AM  (Arrive by 8:15 AM)  Adult Preventative Visit with Maryjane Lyon MD  [facility] [place] ([facility] [place] ) [address]  [place] [address]  [phone]             Routing refill request to provider for review/approval because:

## 2024-06-24 RX ORDER — INSULIN GLARGINE 100 [IU]/ML
INJECTION, SOLUTION SUBCUTANEOUS
Qty: 15 ML | Refills: 0 | Status: SHIPPED | OUTPATIENT
Start: 2024-06-24 | End: 2024-07-19

## 2024-07-09 DIAGNOSIS — E11.9 TYPE 2 DIABETES MELLITUS WITHOUT COMPLICATION, WITH LONG-TERM CURRENT USE OF INSULIN (H): ICD-10-CM

## 2024-07-09 DIAGNOSIS — N95.2 VAGINAL ATROPHY: ICD-10-CM

## 2024-07-09 DIAGNOSIS — Z79.4 TYPE 2 DIABETES MELLITUS WITHOUT COMPLICATION, WITH LONG-TERM CURRENT USE OF INSULIN (H): ICD-10-CM

## 2024-07-09 RX ORDER — ESTRADIOL 0.1 MG/G
2 CREAM VAGINAL
Qty: 42.5 G | Refills: 3 | Status: SHIPPED | OUTPATIENT
Start: 2024-07-10

## 2024-07-09 RX ORDER — PEN NEEDLE, DIABETIC 31 GX5/16"
NEEDLE, DISPOSABLE MISCELLANEOUS
Qty: 100 EACH | Refills: 3 | Status: SHIPPED | OUTPATIENT
Start: 2024-07-09

## 2024-07-19 DIAGNOSIS — E11.9 TYPE 2 DIABETES MELLITUS WITHOUT COMPLICATION, WITH LONG-TERM CURRENT USE OF INSULIN (H): ICD-10-CM

## 2024-07-19 DIAGNOSIS — Z79.4 TYPE 2 DIABETES MELLITUS WITHOUT COMPLICATION, WITH LONG-TERM CURRENT USE OF INSULIN (H): ICD-10-CM

## 2024-07-19 RX ORDER — INSULIN GLARGINE 100 [IU]/ML
INJECTION, SOLUTION SUBCUTANEOUS
Qty: 60 ML | Refills: 0 | Status: SHIPPED | OUTPATIENT
Start: 2024-07-19 | End: 2024-10-07

## 2024-07-19 NOTE — TELEPHONE ENCOUNTER
Javierkel      Last Written Prescription Date:  6.24.24  Last Fill Quantity: #15mL,   # refills: 0  Last Office Visit: 6.4.24  Future Office visit:    Next 5 appointments (look out 90 days)      Aug 27, 2024 8:30 AM  (Arrive by 8:15 AM)  Adult Preventative Visit with Maryjane Lyon MD  [facility] ([facility] ) [address]  [facility] [address]  [phone]     Oct 09, 2024 9:45 AM  (Arrive by 9:30 AM)  Provider Visit with Nathanael Mena MD  [facility] ([facility] ) [address] [place] [address]  [phone]             Routing refill request to provider for review/approval because:

## 2024-07-22 NOTE — NURSING NOTE
"Chief Complaint   Patient presents with     Liver       Initial /74  Pulse 71  Temp 98.9  F (37.2  C)  Ht 5' 2\" (1.575 m)  Wt 182 lb (82.6 kg)  SpO2 95%  BMI 33.29 kg/m2 Estimated body mass index is 33.29 kg/(m^2) as calculated from the following:    Height as of this encounter: 5' 2\" (1.575 m).    Weight as of this encounter: 182 lb (82.6 kg).  Medication Reconciliation: complete   Diane Mccall    " Gen'l: Elderly white female alert, no respiratory distress, non-toxic  Head: NC/AT  Eyes: PERRL, EOMI, no raccoon's  ENT: No Murguia's, O/P clear, mmm, no clinical evidence of facial injury  CV: RRR, normal radial pulse  Lungs: CTA, normal respirations  GI: soft, NT/ND, BS hypoactive normal pitch  : Deferred, no flank nor CVAT  Neck: NT, supple w/o pain  MSK: Patient in EMS c-collar sitting upright in stretcher with no active pain. No midline tenderness or stepoff deformity. Back, chest wall, pelvis: NT & stable.  CROUCH x 4, no focal extremity deformity, swelling nor tenderness. B/L SLR 35 degrees w/o pain, normal motor. No midline or posterior rib tenderness. +bruise and soft tissue swelling overlying mid sternum with slight tenderness. Left shoulder +tenderness with decreased ROM due to pain, AFROM B/L hip and knees without pain  Skin: No tactile warmth, no rash.  No external signs of trauma. +bruise and soft tissue swelling overlying mid sternum with slight tenderness  Neuro: A+O x 3, CN 2 - 12 intact, normal speech, no focal motor/sensory deficits Gen'l: Elderly white female alert, no respiratory distress, non-toxic  Head: NC/AT  Eyes: PERRL, EOMI, no raccoon's  ENT: No Murguia's, O/P clear, mm slightly dry, no clinical evidence of facial injury  CV: RRR, normal radial pulse  Lungs: CTA, normal respirations  GI: soft, NT/ND, BS hypoactive, normal pitch  : Deferred, no flank nor CVAT  Neck: NT, no step-off deformity, EMS c-collar in place  MSK: Patient sitting upright in stretcher with no active pain. Back, pelvis: NT & stable, no midline back nor posterior rib tenderness.  CROUCH x 4, no focal extremity deformity, swelling nor tenderness. B/L SLR 35 degrees w/o pain, normal motor, AFROM B/L hip and knees without pain.  Chest: +bruised soft tissue swelling overlying mid-sternum with slight tenderness. Left shoulder +tenderness with decreased ROM due to pain, LUE distal: no focal tenderness/deformity/swelling,  motor/sensory exam intact incl. normal hand grasp, normal radial pulse, CR normal.   Skin: No tactile warmth, no rash.  No external signs of trauma except for +ecchymotic soft tissue swelling overlying mid-sternum with slight tenderness  Neuro: A+O x 3, CN 2 - 12 intact, normal speech, no focal motor/sensory deficits

## 2024-08-22 SDOH — HEALTH STABILITY: PHYSICAL HEALTH: ON AVERAGE, HOW MANY MINUTES DO YOU ENGAGE IN EXERCISE AT THIS LEVEL?: 30 MIN

## 2024-08-22 SDOH — HEALTH STABILITY: PHYSICAL HEALTH: ON AVERAGE, HOW MANY DAYS PER WEEK DO YOU ENGAGE IN MODERATE TO STRENUOUS EXERCISE (LIKE A BRISK WALK)?: 4 DAYS

## 2024-08-22 ASSESSMENT — SOCIAL DETERMINANTS OF HEALTH (SDOH): HOW OFTEN DO YOU GET TOGETHER WITH FRIENDS OR RELATIVES?: ONCE A WEEK

## 2024-08-26 ASSESSMENT — PATIENT HEALTH QUESTIONNAIRE - PHQ9
SUM OF ALL RESPONSES TO PHQ QUESTIONS 1-9: 5
SUM OF ALL RESPONSES TO PHQ QUESTIONS 1-9: 5
10. IF YOU CHECKED OFF ANY PROBLEMS, HOW DIFFICULT HAVE THESE PROBLEMS MADE IT FOR YOU TO DO YOUR WORK, TAKE CARE OF THINGS AT HOME, OR GET ALONG WITH OTHER PEOPLE: NOT DIFFICULT AT ALL

## 2024-08-27 ENCOUNTER — OFFICE VISIT (OUTPATIENT)
Dept: FAMILY MEDICINE | Facility: OTHER | Age: 64
End: 2024-08-27
Attending: FAMILY MEDICINE
Payer: COMMERCIAL

## 2024-08-27 VITALS
HEIGHT: 64 IN | TEMPERATURE: 98.3 F | SYSTOLIC BLOOD PRESSURE: 128 MMHG | HEART RATE: 51 BPM | BODY MASS INDEX: 31.99 KG/M2 | WEIGHT: 187.4 LBS | DIASTOLIC BLOOD PRESSURE: 72 MMHG | RESPIRATION RATE: 16 BRPM | OXYGEN SATURATION: 96 %

## 2024-08-27 DIAGNOSIS — K76.0 HEPATIC STEATOSIS: ICD-10-CM

## 2024-08-27 DIAGNOSIS — Z00.00 ROUTINE GENERAL MEDICAL EXAMINATION AT A HEALTH CARE FACILITY: Primary | ICD-10-CM

## 2024-08-27 DIAGNOSIS — Z79.4 TYPE 2 DIABETES MELLITUS WITH HYPERGLYCEMIA, WITH LONG-TERM CURRENT USE OF INSULIN (H): ICD-10-CM

## 2024-08-27 DIAGNOSIS — N95.1 MENOPAUSAL SYNDROME: ICD-10-CM

## 2024-08-27 DIAGNOSIS — E11.65 TYPE 2 DIABETES MELLITUS WITH HYPERGLYCEMIA, WITH LONG-TERM CURRENT USE OF INSULIN (H): ICD-10-CM

## 2024-08-27 DIAGNOSIS — E66.01 MORBID OBESITY (H): ICD-10-CM

## 2024-08-27 DIAGNOSIS — E78.2 MIXED HYPERLIPIDEMIA: ICD-10-CM

## 2024-08-27 DIAGNOSIS — E55.9 VITAMIN D DEFICIENCY: ICD-10-CM

## 2024-08-27 DIAGNOSIS — I10 BENIGN ESSENTIAL HYPERTENSION: ICD-10-CM

## 2024-08-27 DIAGNOSIS — N64.59 BREAST THICKENING: ICD-10-CM

## 2024-08-27 DIAGNOSIS — E53.8 VITAMIN B12 DEFICIENCY (NON ANEMIC): ICD-10-CM

## 2024-08-27 PROCEDURE — 99214 OFFICE O/P EST MOD 30 MIN: CPT | Mod: 25 | Performed by: FAMILY MEDICINE

## 2024-08-27 PROCEDURE — 99396 PREV VISIT EST AGE 40-64: CPT | Performed by: FAMILY MEDICINE

## 2024-08-27 NOTE — PROGRESS NOTES
"Preventive Care Visit  [facility]  Maryjane Lyon MD, Family Medicine  Aug 27, 2024    Assessment & Plan     Routine general medical examination at a health care facility  Reviewed vaccines, pt to get flu, covid and consider rsv in fall    Type 2 diabetes mellitus with hyperglycemia, with long-term current use of insulin (H) / Morbid obesity (H)  Controlled. Discussed ozempic dosing. She is still on a fair amount of insulin, may consider up titration of ozempic and decrease in lantus to help with weight mgmt. Pt has PCP appt in fall    Hepatic steatosis  LFT in April wnl. Work on diet and weight mgmt, see above. Continue walking program.     Mixed hyperlipidemia  LDL < 100, continue current dose of statin     Benign essential hypertension  Controlled    Menopausal syndrome  DOing well with estrogen cream, update dexa.   - DX Bone Density    Vitamin B12 deficiency (non anemic)  On chronic metformin, low b12 2 years ago, spotty supplement use, recheck  - Vitamin B12    Vitamin D deficiency  Update labs with DEXA, low normal 2 years ago.   - Vitamin D Deficiency    Breast thickening  Left chest wall/upper breast. Very mild, no clear mass.   - MA Diagnostic Bilateral w/Hector  - US Breast Left Limited 1-3 Quadrants    Patient has been advised of split billing requirements and indicates understanding: Yes    BMI  Estimated body mass index is 32.17 kg/m  as calculated from the following:    Height as of this encounter: 1.626 m (5' 4\").    Weight as of this encounter: 85 kg (187 lb 6.4 oz).     Counseling  Appropriate preventive services were addressed with this patient via screening, questionnaire, or discussion as appropriate for fall prevention, nutrition, physical activity, Tobacco-use cessation, social engagement, weight loss and cognition.  Checklist reviewing preventive services available has been given to the patient.  Reviewed patient's diet, addressing concerns and/or questions.   She is at risk for " psychosocial distress and has been provided with information to reduce risk.   The patient's PHQ-9 score is consistent with mild depression. She was provided with information regarding depression.     Return in about 53 weeks (around 9/2/2025) for Annual Wellness Visit.    Jackie Almonte is a 64 year old, presenting for the following:  Physical        8/27/2024     8:19 AM   Additional Questions   Roomed by matthew palmer   Accompanied by none         8/27/2024     8:19 AM   Patient Reported Additional Medications   Patient reports taking the following new medications none        Health Care Directive  Patient does not have a Health Care Directive or Living Will: Patient states has Advance Directive and will bring in a copy to clinic.    HPI        8/22/2024   General Health   How would you rate your overall physical health? Good   Feel stress (tense, anxious, or unable to sleep) Only a little      (!) STRESS CONCERN      8/22/2024   Nutrition   Three or more servings of calcium each day? Yes   Diet: Low salt    Carbohydrate counting   How many servings of fruit and vegetables per day? (!) 2-3   How many sweetened beverages each day? 0-1       Multiple values from one day are sorted in reverse-chronological order         8/22/2024   Exercise   Days per week of moderate/strenous exercise 4 days   Average minutes spent exercising at this level 30 min            8/22/2024   Social Factors   Frequency of gathering with friends or relatives Once a week   Worry food won't last until get money to buy more No    No   Food not last or not have enough money for food? No    No   Do you have housing? (Housing is defined as stable permanent housing and does not include staying ouside in a car, in a tent, in an abandoned building, in an overnight shelter, or couch-surfing.) Yes    Yes   Are you worried about losing your housing? No    No   Lack of transportation? No    No   Unable to get utilities (heat,electricity)? No    No        Multiple values from one day are sorted in reverse-chronological order         8/22/2024   Fall Risk   Fallen 2 or more times in the past year? No   Trouble with walking or balance? No             8/22/2024   Dental   Dentist two times every year? Yes            8/22/2024   TB Screening   Were you born outside of the [place]? No          Today's PHQ-9 Score:       8/26/2024     8:45 AM   PHQ-9 SCORE   PHQ-9 Total Score MyChart 5 (Mild depression)   PHQ-9 Total Score 5         8/22/2024   Substance Use   Alcohol more than 3/day or more than 7/wk No   Do you use any other substances recreationally? No        Social History     Tobacco Use    Smoking status: Never    Smokeless tobacco: Never   Vaping Use    Vaping status: Never Used   Substance Use Topics    Alcohol use: No    Drug use: No         8/22/2024   Breast Cancer Screening   Family history of breast, colon, or ovarian cancer? No / Unknown          10/16/2023   LAST FHS-7 RESULTS   1st degree relative breast or ovarian cancer No   Any relative bilateral breast cancer No   Any male have breast cancer No   Any ONE woman have BOTH breast AND ovarian cancer No   Any woman with breast cancer before 50yrs No   2 or more relatives with breast AND/OR ovarian cancer No   2 or more relatives with breast AND/OR bowel cancer No      Mammogram Screening - Mammogram every 1-2 years updated in Health Maintenance based on mutual decision making        8/22/2024   STI Screening   New sexual partner(s) since last STI/HIV test? No      History of abnormal Pap smear: No - S/p LEIF     ASCVD Risk   The 10-year ASCVD risk score (Wilfrido [place], et al., 2019) is: 7.7%    Values used to calculate the score:      Age: 64 years      Sex: Female      Is Non- : No      Diabetic: Yes      Tobacco smoker: No      Systolic Blood Pressure: 128 mmHg      Is BP treated: No      HDL Cholesterol: 36 mg/dL      Total Cholesterol: 138 mg/dL    Reviewed and updated as  "needed this visit by Provider   Tobacco  Allergies  Meds  Problems  Med Hx  Surg Hx  Fam Hx            Review of Systems  Constitutional, neuro, ENT, endocrine, pulmonary, cardiac, gastrointestinal, genitourinary, musculoskeletal, integument and psychiatric systems are negative, except as otherwise noted.     Objective    Exam  /72 (BP Location: Left arm, Patient Position: Sitting, Cuff Size: Adult Large)   Pulse 51   Temp 98.3  F (36.8  C) (Tympanic)   Resp 16   Ht 1.626 m (5' 4\")   Wt 85 kg (187 lb 6.4 oz)   SpO2 96%   BMI 32.17 kg/m     Estimated body mass index is 32.17 kg/m  as calculated from the following:    Height as of this encounter: 1.626 m (5' 4\").    Weight as of this encounter: 85 kg (187 lb 6.4 oz).    Physical Exam    GENERAL: alert and no distress  EYES: Eyes grossly normal to inspection  HENT: ear canals and TM's normal, nose and mouth without ulcers or lesions  NECK: no adenopathy, no asymmetry, masses, or scars  RESP: lungs clear to auscultation - no rales, rhonchi or wheezes  BREAST: no palpable axillary masses or adenopathy and healed scar above right breast from previous biopsy, mild area of ill defined thickening left chest wall  CV: regular rates and rhythm, normal S1 S2, no S3 or S4, no murmur, click or rub, and no peripheral edema  ABDOMEN: soft, nontender, no hepatosplenomegaly, no masses and bowel sounds normal  MS: no gross musculoskeletal defects noted, no edema  SKIN: no suspicious lesions or rashes  NEURO: Normal strength and tone, mentation intact, and speech normal  PSYCH: mentation appears normal, affect normal/bright    Signed Electronically by: Maryjane Lyon MD    Answers submitted by the patient for this visit:  Patient Health Questionnaire (Submitted on 8/26/2024)  If you checked off any problems, how difficult have these problems made it for you to do your work, take care of things at home, or get along with other people?: Not difficult at all  PHQ9 " TOTAL SCORE: 5

## 2024-08-27 NOTE — PATIENT INSTRUCTIONS
Patient Education   Preventive Care Advice   This is general advice given by our system to help you stay healthy. However, your care team may have specific advice just for you. Please talk to your care team about your preventive care needs.  Nutrition  Eat 5 or more servings of fruits and vegetables each day.  Try wheat bread, brown rice and whole grain pasta (instead of white bread, rice, and pasta).  Get enough calcium and vitamin D. Check the label on foods and aim for 100% of the RDA (recommended daily allowance).  Lifestyle  Exercise at least 150 minutes each week  (30 minutes a day, 5 days a week).  Do muscle strengthening activities 2 days a week. These help control your weight and prevent disease.  No smoking.  Wear sunscreen to prevent skin cancer.  Have a dental exam and cleaning every 6 months.  Yearly exams  See your health care team every year to talk about:  Any changes in your health.  Any medicines your care team has prescribed.  Preventive care, family planning, and ways to prevent chronic diseases.  Shots (vaccines)   HPV shots (up to age 26), if you've never had them before.  Hepatitis B shots (up to age 59), if you've never had them before.  COVID-19 shot: Get this shot when it's due.  Flu shot: Get a flu shot every year.  Tetanus shot: Get a tetanus shot every 10 years.  Pneumococcal, hepatitis A, and RSV shots: Ask your care team if you need these based on your risk.  Shingles shot (for age 50 and up)  General health tests  Diabetes screening:  Starting at age 35, Get screened for diabetes at least every 3 years.  If you are younger than age 35, ask your care team if you should be screened for diabetes.  Cholesterol test: At age 39, start having a cholesterol test every 5 years, or more often if advised.  Bone density scan (DEXA): At age 50, ask your care team if you should have this scan for osteoporosis (brittle bones).  Hepatitis C: Get tested at least once in your life.  STIs (sexually  transmitted infections)  Before age 24: Ask your care team if you should be screened for STIs.  After age 24: Get screened for STIs if you're at risk. You are at risk for STIs (including HIV) if:  You are sexually active with more than one person.  You don't use condoms every time.  You or a partner was diagnosed with a sexually transmitted infection.  If you are at risk for HIV, ask about PrEP medicine to prevent HIV.  Get tested for HIV at least once in your life, whether you are at risk for HIV or not.  Cancer screening tests  Cervical cancer screening: If you have a cervix, begin getting regular cervical cancer screening tests starting at age 21.  Breast cancer scan (mammogram): If you've ever had breasts, begin having regular mammograms starting at age 40. This is a scan to check for breast cancer.  Colon cancer screening: It is important to start screening for colon cancer at age 45.  Have a colonoscopy test every 10 years (or more often if you're at risk) Or, ask your provider about stool tests like a FIT test every year or Cologuard test every 3 years.  To learn more about your testing options, visit:   .  For help making a decision, visit:   https://bit.ly/yq84613.  Prostate cancer screening test: If you have a prostate, ask your care team if a prostate cancer screening test (PSA) at age 55 is right for you.  Lung cancer screening: If you are a current or former smoker ages 50 to 80, ask your care team if ongoing lung cancer screenings are right for you.  For informational purposes only. Not to replace the advice of your health care provider. Copyright   2023 [facility]. All rights reserved. Clinically reviewed by the [facility]. [employer] [id number] - REV 01/24.  Learning About Depression Screening  What is depression screening?  Depression screening is a way to see if you have depression symptoms. It may be done by a doctor or counselor. It's often part of a routine  "checkup. That's because your mental health is just as important as your physical health.  Depression is a mental health condition that affects how you feel, think, and act. You may:  Have less energy.  Lose interest in your daily activities.  Feel sad and grouchy for a long time.  Depression is very common. It affects people of all ages.  Many things can lead to depression. Some people become depressed after they have a stroke or find out they have a major illness like cancer or heart disease. The death of a loved one or a breakup may lead to depression. It can run in families. Most experts believe that a combination of inherited genes and stressful life events can cause it.  What happens during screening?  You may be asked to fill out a form about your depression symptoms. You and the doctor will discuss your answers. The doctor may ask you more questions to learn more about how you think, act, and feel.  What happens after screening?  If you have symptoms of depression, your doctor will talk to you about your options.  Doctors usually treat depression with medicines or counseling. Often, combining the two works best. Many people don't get help because they think that they'll get over the depression on their own. But people with depression may not get better unless they get treatment.  The cause of depression is not well understood. There may be many factors involved. But if you have depression, it's not your fault.  A serious symptom of depression is thinking about death or suicide. If you or someone you care about talks about this or about feeling hopeless, get help right away.  It's important to know that depression can be treated. Medicine, counseling, and self-care may help.  Where can you learn more?  Go to https://www.[employer].net/patiented  Enter T185 in the search box to learn more about \"Learning About Depression Screening.\"  Current as of: June 24, 2023  Content Version: 14.1 2006-2024 [employer] " [employer].   Care instructions adapted under license by your healthcare professional. If you have questions about a medical condition or this instruction, always ask your healthcare professional. [employer] disclaims any warranty or liability for your use of this information.

## 2024-08-30 ENCOUNTER — HOSPITAL ENCOUNTER (OUTPATIENT)
Dept: ULTRASOUND IMAGING | Facility: HOSPITAL | Age: 64
Discharge: HOME | End: 2024-08-30
Attending: FAMILY MEDICINE
Payer: COMMERCIAL

## 2024-08-30 ENCOUNTER — HOSPITAL ENCOUNTER (OUTPATIENT)
Dept: MAMMOGRAPHY | Facility: OTHER | Age: 64
Discharge: HOME | End: 2024-08-30
Attending: FAMILY MEDICINE
Payer: COMMERCIAL

## 2024-08-30 DIAGNOSIS — N64.59 BREAST THICKENING: ICD-10-CM

## 2024-08-30 PROCEDURE — G0279 TOMOSYNTHESIS, MAMMO: HCPCS | Mod: TC | Performed by: RADIOLOGY

## 2024-08-30 PROCEDURE — 77065 DX MAMMO INCL CAD UNI: CPT | Mod: TC | Performed by: RADIOLOGY

## 2024-08-30 PROCEDURE — 76604 US EXAM CHEST: CPT | Mod: 52

## 2024-09-10 ENCOUNTER — TRANSFERRED RECORDS (OUTPATIENT)
Dept: HEALTH INFORMATION MANAGEMENT | Facility: CLINIC | Age: 64
End: 2024-09-10

## 2024-09-10 LAB — RETINOPATHY: NEGATIVE

## 2024-09-19 ENCOUNTER — HOSPITAL ENCOUNTER (OUTPATIENT)
Dept: BONE DENSITY | Facility: HOSPITAL | Age: 64
Discharge: HOME | End: 2024-09-19
Attending: FAMILY MEDICINE | Admitting: FAMILY MEDICINE
Payer: COMMERCIAL

## 2024-09-19 DIAGNOSIS — N95.1 MENOPAUSAL SYNDROME: ICD-10-CM

## 2024-09-19 PROCEDURE — 77080 DXA BONE DENSITY AXIAL: CPT

## 2024-09-24 NOTE — PROGRESS NOTES
"  Assessment & Plan     Type 2 diabetes mellitus with hyperglycemia, with long-term current use of insulin (H)  Doing well.  Update full labs.  Titrate oxempic.     - Basic metabolic panel; Future  - Hemoglobin A1c; Future  - TSH with free T4 reflex; Future  - Albumin Random Urine Quantitative with Creat Ratio; Future    Need for prophylactic vaccination and inoculation against influenza  Given.   - INFLUENZA VACCINE TRIVALENT(FLUBLOK)    Benign essential hypertension  Stable.     Depression, unspecified depression type  Stable.  Brother going through cancer.      Rash and nonspecific skin eruption  Eczema on the wrist.    - triamcinolone (ARISTOCORT HP) 0.5 % external cream; Apply topically 2 times daily.          BMI  Estimated body mass index is 32.61 kg/m  as calculated from the following:    Height as of 8/27/24: 1.626 m (5' 4\").    Weight as of this encounter: 86.2 kg (190 lb).             No follow-ups on file.    Jackie Almonte is a 64 year old, presenting for the following health issues:  Diabetes        10/9/2024     9:28 AM   Additional Questions   Roomed by Diane   Accompanied by self     HPI     Diabetes Follow-up    How often are you checking your blood sugar? Two times daily  Blood sugar testing frequency justification:  Uncontrolled diabetes  What time of day are you checking your blood sugars (select all that apply)?  Before and after meals  Have you had any blood sugars above 200?  Yes   Have you had any blood sugars below 70?  Yes   What symptoms do you notice when your blood sugar is low?  Shaky and Weak  What concerns do you have today about your diabetes? Blood sugar is often over 200 and Low blood sugar   Do you have any of these symptoms? (Select all that apply)  No numbness or tingling in feet.  No redness, sores or blisters on feet.  No complaints of excessive thirst.  No reports of blurry vision.  No significant changes to weight.      BP Readings from Last 2 Encounters:   10/09/24 126/70 "   08/27/24 128/72     Hemoglobin A1C (%)   Date Value   06/04/2024 6.6 (H)   01/24/2024 7.0 (H)   06/23/2021 7.8 (H)   09/24/2020 6.4 (H)     LDL Cholesterol Calculated (mg/dL)   Date Value   06/04/2024 63   06/07/2023 68   06/23/2021 74   06/19/2020 73             Hypertension Follow-up    Do you check your blood pressure regularly outside of the clinic? No   Are you following a low salt diet? Yes  Are your blood pressures ever more than 140 on the top number (systolic) OR more   than 90 on the bottom number (diastolic), for example 140/90? NA            Review of Systems  Constitutional, HEENT, cardiovascular, pulmonary, gi and gu systems are negative, except as otherwise noted.      Objective    /70   Pulse 58   Temp 97.6  F (36.4  C) (Tympanic)   Wt 86.2 kg (190 lb)   SpO2 98%   BMI 32.61 kg/m    Body mass index is 32.61 kg/m .  Physical Exam   GENERAL: alert and no distress  NECK: no adenopathy, no asymmetry, masses, or scars  RESP: lungs clear to auscultation - no rales, rhonchi or wheezes  CV: regular rate and rhythm, normal S1 S2, no S3 or S4, no murmur, click or rub, no peripheral edema  ABDOMEN: soft, nontender, no hepatosplenomegaly, no masses and bowel sounds normal  MS: no gross musculoskeletal defects noted, no edema    Full labs pending.      The longitudinal plan of care for the diagnosis(es)/condition(s) as documented were addressed during this visit. Due to the added complexity in care, I will continue to support Suzy in the subsequent management and with ongoing continuity of care.        Signed Electronically by: Nathanael Mena MD

## 2024-10-05 DIAGNOSIS — Z79.4 TYPE 2 DIABETES MELLITUS WITHOUT COMPLICATION, WITH LONG-TERM CURRENT USE OF INSULIN (H): ICD-10-CM

## 2024-10-05 DIAGNOSIS — E11.9 TYPE 2 DIABETES MELLITUS WITHOUT COMPLICATION, WITH LONG-TERM CURRENT USE OF INSULIN (H): ICD-10-CM

## 2024-10-07 RX ORDER — INSULIN GLARGINE 100 [IU]/ML
INJECTION, SOLUTION SUBCUTANEOUS
Qty: 3 ML | Refills: 3 | Status: SHIPPED | OUTPATIENT
Start: 2024-10-07

## 2024-10-09 ENCOUNTER — OFFICE VISIT (OUTPATIENT)
Dept: FAMILY MEDICINE | Facility: OTHER | Age: 64
End: 2024-10-09
Attending: FAMILY MEDICINE
Payer: COMMERCIAL

## 2024-10-09 VITALS
WEIGHT: 190 LBS | TEMPERATURE: 97.6 F | BODY MASS INDEX: 32.61 KG/M2 | HEART RATE: 58 BPM | SYSTOLIC BLOOD PRESSURE: 126 MMHG | OXYGEN SATURATION: 98 % | DIASTOLIC BLOOD PRESSURE: 70 MMHG

## 2024-10-09 DIAGNOSIS — E53.8 VITAMIN B12 DEFICIENCY (NON ANEMIC): ICD-10-CM

## 2024-10-09 DIAGNOSIS — Z79.4 TYPE 2 DIABETES MELLITUS WITH HYPERGLYCEMIA, WITH LONG-TERM CURRENT USE OF INSULIN (H): ICD-10-CM

## 2024-10-09 DIAGNOSIS — F32.A DEPRESSION, UNSPECIFIED DEPRESSION TYPE: ICD-10-CM

## 2024-10-09 DIAGNOSIS — E11.65 TYPE 2 DIABETES MELLITUS WITH HYPERGLYCEMIA, WITH LONG-TERM CURRENT USE OF INSULIN (H): Primary | ICD-10-CM

## 2024-10-09 DIAGNOSIS — I10 BENIGN ESSENTIAL HYPERTENSION: ICD-10-CM

## 2024-10-09 DIAGNOSIS — R21 RASH AND NONSPECIFIC SKIN ERUPTION: ICD-10-CM

## 2024-10-09 DIAGNOSIS — E11.65 TYPE 2 DIABETES MELLITUS WITH HYPERGLYCEMIA, WITH LONG-TERM CURRENT USE OF INSULIN (H): ICD-10-CM

## 2024-10-09 DIAGNOSIS — E55.9 VITAMIN D DEFICIENCY: ICD-10-CM

## 2024-10-09 DIAGNOSIS — I89.0 LYMPHEDEMA: ICD-10-CM

## 2024-10-09 DIAGNOSIS — Z79.4 TYPE 2 DIABETES MELLITUS WITH HYPERGLYCEMIA, WITH LONG-TERM CURRENT USE OF INSULIN (H): Primary | ICD-10-CM

## 2024-10-09 DIAGNOSIS — Z23 NEED FOR PROPHYLACTIC VACCINATION AND INOCULATION AGAINST INFLUENZA: ICD-10-CM

## 2024-10-09 LAB
ANION GAP SERPL CALCULATED.3IONS-SCNC: 12 MMOL/L (ref 7–15)
BUN SERPL-MCNC: 11.6 MG/DL (ref 8–23)
CALCIUM SERPL-MCNC: 9.8 MG/DL (ref 8.8–10.4)
CHLORIDE SERPL-SCNC: 101 MMOL/L (ref 98–107)
CREAT SERPL-MCNC: 0.74 MG/DL (ref 0.51–0.95)
CREAT UR-MCNC: 92.3 MG/DL
EGFRCR SERPLBLD CKD-EPI 2021: >90 ML/MIN/1.73M2
EST. AVERAGE GLUCOSE BLD GHB EST-MCNC: 140 MG/DL
GLUCOSE SERPL-MCNC: 71 MG/DL (ref 70–99)
HBA1C MFR BLD: 6.5 %
HCO3 SERPL-SCNC: 28 MMOL/L (ref 22–29)
MICROALBUMIN UR-MCNC: <12 MG/L
MICROALBUMIN/CREAT UR: NORMAL MG/G{CREAT}
POTASSIUM SERPL-SCNC: 4.7 MMOL/L (ref 3.4–5.3)
SODIUM SERPL-SCNC: 141 MMOL/L (ref 135–145)
TSH SERPL DL<=0.005 MIU/L-ACNC: 3.52 UIU/ML (ref 0.3–4.2)

## 2024-10-09 PROCEDURE — 82043 UR ALBUMIN QUANTITATIVE: CPT | Performed by: FAMILY MEDICINE

## 2024-10-09 PROCEDURE — 82607 VITAMIN B-12: CPT | Performed by: FAMILY MEDICINE

## 2024-10-09 PROCEDURE — G2211 COMPLEX E/M VISIT ADD ON: HCPCS | Performed by: FAMILY MEDICINE

## 2024-10-09 PROCEDURE — 80048 BASIC METABOLIC PNL TOTAL CA: CPT | Performed by: FAMILY MEDICINE

## 2024-10-09 PROCEDURE — 90471 IMMUNIZATION ADMIN: CPT | Performed by: FAMILY MEDICINE

## 2024-10-09 PROCEDURE — 99214 OFFICE O/P EST MOD 30 MIN: CPT | Mod: 25 | Performed by: FAMILY MEDICINE

## 2024-10-09 PROCEDURE — 82570 ASSAY OF URINE CREATININE: CPT | Performed by: FAMILY MEDICINE

## 2024-10-09 PROCEDURE — 36415 COLL VENOUS BLD VENIPUNCTURE: CPT | Performed by: FAMILY MEDICINE

## 2024-10-09 PROCEDURE — 82306 VITAMIN D 25 HYDROXY: CPT | Performed by: FAMILY MEDICINE

## 2024-10-09 PROCEDURE — 90673 RIV3 VACCINE NO PRESERV IM: CPT | Performed by: FAMILY MEDICINE

## 2024-10-09 PROCEDURE — 83036 HEMOGLOBIN GLYCOSYLATED A1C: CPT | Performed by: FAMILY MEDICINE

## 2024-10-09 PROCEDURE — 84443 ASSAY THYROID STIM HORMONE: CPT | Performed by: FAMILY MEDICINE

## 2024-10-09 RX ORDER — SEMAGLUTIDE 0.68 MG/ML
INJECTION, SOLUTION SUBCUTANEOUS
Qty: 3 ML | Refills: 5 | Status: SHIPPED | OUTPATIENT
Start: 2024-10-09 | End: 2024-10-09

## 2024-10-09 RX ORDER — TRIAMCINOLONE ACETONIDE 5 MG/G
CREAM TOPICAL 2 TIMES DAILY
Qty: 45 G | Refills: 1 | Status: SHIPPED | OUTPATIENT
Start: 2024-10-09

## 2024-10-09 ASSESSMENT — PAIN SCALES - GENERAL: PAINLEVEL: NO PAIN (0)

## 2024-10-10 LAB
VIT B12 SERPL-MCNC: 608 PG/ML (ref 232–1245)
VIT D+METAB SERPL-MCNC: 39 NG/ML (ref 20–50)

## 2025-01-02 NOTE — PROGRESS NOTES
"  Assessment & Plan     Type 2 diabetes mellitus with hyperglycemia, with long-term current use of insulin (H)  Doing well.  Sugars likely better.  Tough to lose weight.  Increase ozempic to 2 mg.  3 month dm recheck.  Doing well and caring for brother with cancer treatment.    - Albumin Random Urine Quantitative with Creat Ratio; Future  - Basic metabolic panel; Future  - Hemoglobin A1c; Future  - [place] FOOT EXAM NO CHARGE  - Semaglutide, 1 MG/DOSE, (OZEMPIC) 4 MG/3ML pen; Inject 2 mg subcutaneously every 7 days.    Benign essential hypertension  Stable.  Doing great.            BMI  Estimated body mass index is 31.58 kg/m  as calculated from the following:    Height as of 8/27/24: 1.626 m (5' 4\").    Weight as of this encounter: 83.5 kg (184 lb).             No follow-ups on file.    Jackie Almonte is a 64 year old, presenting for the following health issues:  Diabetes        1/9/2025     7:59 AM   Additional Questions   Roomed by Diane SHAH     Diabetes Follow-up    How often are you checking your blood sugar? Three times daily  Blood sugar testing frequency justification:  Uncontrolled diabetes  What time of day are you checking your blood sugars (select all that apply)?  Before and after meals  Have you had any blood sugars above 200?  No  Have you had any blood sugars below 70?  Yes   What symptoms do you notice when your blood sugar is low?  Shaky and Weak  What concerns do you have today about your diabetes? Low blood sugar   Do you have any of these symptoms? (Select all that apply)  No numbness or tingling in feet.  No redness, sores or blisters on feet.  No complaints of excessive thirst.  No reports of blurry vision.  No significant changes to weight.      BP Readings from Last 2 Encounters:   01/09/25 126/72   10/09/24 126/70     Hemoglobin A1C (%)   Date Value   10/09/2024 6.5 (H)   06/04/2024 6.6 (H)   06/23/2021 7.8 (H)   09/24/2020 6.4 (H)     LDL Cholesterol Calculated (mg/dL)   Date Value "   06/04/2024 63   06/07/2023 68   06/23/2021 74   06/19/2020 73             Hypertension Follow-up    Do you check your blood pressure regularly outside of the clinic? No   Are you following a low salt diet? Yes  Are your blood pressures ever more than 140 on the top number (systolic) OR more   than 90 on the bottom number (diastolic), for example 140/90? N/A  Diabetic foot exam   1. foot deformity   Yes  2. Current or previous foot ulceration     No  3. Current or previous pre-ulcerative calluses     Yes  4. previous partial amputation of one or both feet or complete amputation of one foot     No  5. peripheral neuropathy with evidence of callus formation     No  6. poor circulation     No  Approval given for 3 pairs of diabetic shoes and inserts if patient desires.          Review of Systems  Constitutional, HEENT, cardiovascular, pulmonary, gi and gu systems are negative, except as otherwise noted.      Objective    /72   Pulse 64   Temp 98  F (36.7  C) (Tympanic)   Wt 83.5 kg (184 lb)   SpO2 94%   BMI 31.58 kg/m    Body mass index is 31.58 kg/m .  Physical Exam   GENERAL: alert and no distress  NECK: no adenopathy, no asymmetry, masses, or scars  RESP: lungs clear to auscultation - no rales, rhonchi or wheezes  CV: regular rate and rhythm, normal S1 S2, no S3 or S4, no murmur, click or rub, no peripheral edema  ABDOMEN: soft, nontender, no hepatosplenomegaly, no masses and bowel sounds normal  MS: no gross musculoskeletal defects noted, no edema    Full labs pending including urine.  Mood stable.  Edema stable.  Monitor and follow.      The longitudinal plan of care for the diagnosis(es)/condition(s) as documented were addressed during this visit. Due to the added complexity in care, I will continue to support Suzy in the subsequent management and with ongoing continuity of care.        Signed Electronically by: Nathanael Mena MD

## 2025-01-09 ENCOUNTER — OFFICE VISIT (OUTPATIENT)
Dept: FAMILY MEDICINE | Facility: OTHER | Age: 65
End: 2025-01-09
Attending: FAMILY MEDICINE
Payer: COMMERCIAL

## 2025-01-09 VITALS
WEIGHT: 184 LBS | OXYGEN SATURATION: 94 % | HEART RATE: 64 BPM | DIASTOLIC BLOOD PRESSURE: 72 MMHG | SYSTOLIC BLOOD PRESSURE: 126 MMHG | TEMPERATURE: 98 F | BODY MASS INDEX: 31.58 KG/M2

## 2025-01-09 DIAGNOSIS — E11.65 TYPE 2 DIABETES MELLITUS WITH HYPERGLYCEMIA, WITH LONG-TERM CURRENT USE OF INSULIN (H): Primary | ICD-10-CM

## 2025-01-09 DIAGNOSIS — Z79.4 TYPE 2 DIABETES MELLITUS WITH HYPERGLYCEMIA, WITH LONG-TERM CURRENT USE OF INSULIN (H): Primary | ICD-10-CM

## 2025-01-09 DIAGNOSIS — I10 BENIGN ESSENTIAL HYPERTENSION: ICD-10-CM

## 2025-01-09 LAB
ANION GAP SERPL CALCULATED.3IONS-SCNC: 17 MMOL/L (ref 7–15)
BUN SERPL-MCNC: 11.2 MG/DL (ref 8–23)
CALCIUM SERPL-MCNC: 9.6 MG/DL (ref 8.8–10.4)
CHLORIDE SERPL-SCNC: 99 MMOL/L (ref 98–107)
CREAT SERPL-MCNC: 0.77 MG/DL (ref 0.51–0.95)
CREAT UR-MCNC: 75.9 MG/DL
EGFRCR SERPLBLD CKD-EPI 2021: 87 ML/MIN/1.73M2
EST. AVERAGE GLUCOSE BLD GHB EST-MCNC: 131 MG/DL
GLUCOSE SERPL-MCNC: 79 MG/DL (ref 70–99)
HBA1C MFR BLD: 6.2 %
HCO3 SERPL-SCNC: 23 MMOL/L (ref 22–29)
MICROALBUMIN UR-MCNC: <12 MG/L
MICROALBUMIN/CREAT UR: NORMAL MG/G{CREAT}
POTASSIUM SERPL-SCNC: 3.6 MMOL/L (ref 3.4–5.3)
SODIUM SERPL-SCNC: 139 MMOL/L (ref 135–145)

## 2025-01-09 ASSESSMENT — PAIN SCALES - GENERAL: PAINLEVEL_OUTOF10: NO PAIN (0)

## 2025-01-14 DIAGNOSIS — E11.65 TYPE 2 DIABETES MELLITUS WITH HYPERGLYCEMIA, WITH LONG-TERM CURRENT USE OF INSULIN (H): Primary | ICD-10-CM

## 2025-01-14 DIAGNOSIS — Z79.4 TYPE 2 DIABETES MELLITUS WITH HYPERGLYCEMIA, WITH LONG-TERM CURRENT USE OF INSULIN (H): Primary | ICD-10-CM

## 2025-01-16 ENCOUNTER — ANCILLARY PROCEDURE (OUTPATIENT)
Dept: MAMMOGRAPHY | Facility: OTHER | Age: 65
End: 2025-01-16
Attending: FAMILY MEDICINE
Payer: COMMERCIAL

## 2025-01-16 ENCOUNTER — TELEPHONE (OUTPATIENT)
Dept: MAMMOGRAPHY | Facility: HOSPITAL | Age: 65
End: 2025-01-16

## 2025-01-16 DIAGNOSIS — Z12.31 SCREENING MAMMOGRAM, ENCOUNTER FOR: ICD-10-CM

## 2025-01-16 PROCEDURE — 77063 BREAST TOMOSYNTHESIS BI: CPT | Mod: TC | Performed by: RADIOLOGY

## 2025-01-16 PROCEDURE — 77067 SCR MAMMO BI INCL CAD: CPT | Mod: TC | Performed by: RADIOLOGY

## 2025-02-10 DIAGNOSIS — Z79.4 TYPE 2 DIABETES MELLITUS WITHOUT COMPLICATION, WITH LONG-TERM CURRENT USE OF INSULIN (H): ICD-10-CM

## 2025-02-10 DIAGNOSIS — E11.9 TYPE 2 DIABETES MELLITUS WITHOUT COMPLICATION, WITH LONG-TERM CURRENT USE OF INSULIN (H): ICD-10-CM

## 2025-02-10 NOTE — TELEPHONE ENCOUNTER
[facility]al      Last Written Prescription Date:  10/7/24  Last Fill Quantity: 3mL,   # refills: 3  Last Office Visit: 1/9/25  Future Office visit:    Next 5 appointments (look out 90 days)      Apr 10, 2025 8:45 AM  (Arrive by 8:30 AM)  Provider Visit with Nathanael Mena MD  [facility] ([facility] ) [address] [facility] [address]  [phone]             Routing refill request to provider for review/approval because:

## 2025-02-11 RX ORDER — INSULIN GLARGINE 100 [IU]/ML
INJECTION, SOLUTION SUBCUTANEOUS
Qty: 15 ML | Refills: 1 | Status: SHIPPED | OUTPATIENT
Start: 2025-02-11

## 2025-04-03 NOTE — PROGRESS NOTES
"  Assessment & Plan     Type 2 diabetes mellitus without complication, with long-term current use of insulin (H)  Update labs.  Sugars quite good lately.  Check A1c and follow.  Had a low of 47 and we might just inch down the insulin.    - Basic metabolic panel; Future  - Hemoglobin A1c; Future    Benign essential hypertension  Stable.  No dose changes.      Elevated liver function tests  History of.  Recheck next time.      Anxiety  Stable.            BMI  Estimated body mass index is 30.9 kg/m  as calculated from the following:    Height as of 8/27/24: 1.626 m (5' 4\").    Weight as of this encounter: 81.6 kg (180 lb).             No follow-ups on file.    Jackie Almonte is a 64 year old, presenting for the following health issues:  Diabetes        4/10/2025     8:43 AM   Additional Questions   Roomed by Diane SHAH      Diabetes Follow-up    How often are you checking your blood sugar? Three times daily  Blood sugar testing frequency justification:  Uncontrolled diabetes  What time of day are you checking your blood sugars (select all that apply)?  Before and after meals  Have you had any blood sugars above 200?  No  Have you had any blood sugars below 70?  Yes   What symptoms do you notice when your blood sugar is low?  Shaky and Weak  What concerns do you have today about your diabetes? Low blood sugar   Do you have any of these symptoms? (Select all that apply)  No numbness or tingling in feet.  No redness, sores or blisters on feet.  No complaints of excessive thirst.  No reports of blurry vision.  No significant changes to weight.      BP Readings from Last 2 Encounters:   04/10/25 130/76   01/09/25 126/72     Hemoglobin A1C (%)   Date Value   01/09/2025 6.2 (H)   10/09/2024 6.5 (H)   06/23/2021 7.8 (H)   09/24/2020 6.4 (H)     LDL Cholesterol Calculated (mg/dL)   Date Value   06/04/2024 63   06/07/2023 68   06/23/2021 74   06/19/2020 73             Hypertension Follow-up    Do you check your blood " pressure regularly outside of the clinic? No   Are you following a low salt diet? Yes  Are your blood pressures ever more than 140 on the top number (systolic) OR more   than 90 on the bottom number (diastolic), for example 140/90? N/A    BP Readings from Last 2 Encounters:   04/10/25 130/76   01/09/25 126/72             Review of Systems  Constitutional, HEENT, cardiovascular, pulmonary, gi and gu systems are negative, except as otherwise noted.      Objective    /76   Pulse 63   Temp 98.2  F (36.8  C) (Tympanic)   Wt 81.6 kg (180 lb)   SpO2 97%   BMI 30.90 kg/m    Body mass index is 30.9 kg/m .  Physical Exam   GENERAL: alert and no distress  NECK: no adenopathy, no asymmetry, masses, or scars  RESP: lungs clear to auscultation - no rales, rhonchi or wheezes  CV: regular rate and rhythm, normal S1 S2, no S3 or S4, no murmur, click or rub, no peripheral edema  ABDOMEN: soft, nontender, no hepatosplenomegaly, no masses and bowel sounds normal  MS: no gross musculoskeletal defects noted, trace edema    Labs pending.      The longitudinal plan of care for the diagnosis(es)/condition(s) as documented were addressed during this visit. Due to the added complexity in care, I will continue to support Suzy in the subsequent management and with ongoing continuity of care.        Signed Electronically by: Nathanael Mena MD

## 2025-04-05 DIAGNOSIS — Z79.4 TYPE 2 DIABETES MELLITUS WITHOUT COMPLICATION, WITH LONG-TERM CURRENT USE OF INSULIN (H): ICD-10-CM

## 2025-04-05 DIAGNOSIS — E11.9 TYPE 2 DIABETES MELLITUS WITHOUT COMPLICATION, WITH LONG-TERM CURRENT USE OF INSULIN (H): ICD-10-CM

## 2025-04-07 RX ORDER — INSULIN GLARGINE 100 [IU]/ML
INJECTION, SOLUTION SUBCUTANEOUS
Qty: 15 ML | Refills: 3 | Status: SHIPPED | OUTPATIENT
Start: 2025-04-07

## 2025-04-10 ENCOUNTER — OFFICE VISIT (OUTPATIENT)
Dept: FAMILY MEDICINE | Facility: OTHER | Age: 65
End: 2025-04-10
Attending: FAMILY MEDICINE
Payer: COMMERCIAL

## 2025-04-10 VITALS
SYSTOLIC BLOOD PRESSURE: 130 MMHG | WEIGHT: 180 LBS | OXYGEN SATURATION: 97 % | TEMPERATURE: 98.2 F | HEART RATE: 63 BPM | DIASTOLIC BLOOD PRESSURE: 76 MMHG | BODY MASS INDEX: 30.9 KG/M2

## 2025-04-10 DIAGNOSIS — E11.9 TYPE 2 DIABETES MELLITUS WITHOUT COMPLICATION, WITH LONG-TERM CURRENT USE OF INSULIN (H): Primary | ICD-10-CM

## 2025-04-10 DIAGNOSIS — F41.9 ANXIETY: ICD-10-CM

## 2025-04-10 DIAGNOSIS — R79.89 ELEVATED LIVER FUNCTION TESTS: ICD-10-CM

## 2025-04-10 DIAGNOSIS — Z79.4 TYPE 2 DIABETES MELLITUS WITHOUT COMPLICATION, WITH LONG-TERM CURRENT USE OF INSULIN (H): Primary | ICD-10-CM

## 2025-04-10 DIAGNOSIS — I10 BENIGN ESSENTIAL HYPERTENSION: ICD-10-CM

## 2025-04-10 DIAGNOSIS — Z79.4 TYPE 2 DIABETES MELLITUS WITH HYPERGLYCEMIA, WITH LONG-TERM CURRENT USE OF INSULIN (H): ICD-10-CM

## 2025-04-10 DIAGNOSIS — E11.65 TYPE 2 DIABETES MELLITUS WITH HYPERGLYCEMIA, WITH LONG-TERM CURRENT USE OF INSULIN (H): ICD-10-CM

## 2025-04-10 LAB
ANION GAP SERPL CALCULATED.3IONS-SCNC: 11 MMOL/L (ref 7–15)
BUN SERPL-MCNC: 8.1 MG/DL (ref 8–23)
CALCIUM SERPL-MCNC: 9.7 MG/DL (ref 8.8–10.4)
CHLORIDE SERPL-SCNC: 101 MMOL/L (ref 98–107)
CREAT SERPL-MCNC: 0.74 MG/DL (ref 0.51–0.95)
EGFRCR SERPLBLD CKD-EPI 2021: >90 ML/MIN/1.73M2
EST. AVERAGE GLUCOSE BLD GHB EST-MCNC: 126 MG/DL
GLUCOSE SERPL-MCNC: 83 MG/DL (ref 70–99)
HBA1C MFR BLD: 6 %
HCO3 SERPL-SCNC: 29 MMOL/L (ref 22–29)
POTASSIUM SERPL-SCNC: 4.1 MMOL/L (ref 3.4–5.3)
SODIUM SERPL-SCNC: 141 MMOL/L (ref 135–145)

## 2025-04-10 RX ORDER — SEMAGLUTIDE 1.34 MG/ML
1 INJECTION, SOLUTION SUBCUTANEOUS
Qty: 2 ML | Refills: 3 | Status: SHIPPED | OUTPATIENT
Start: 2025-04-10 | End: 2025-04-10

## 2025-04-10 ASSESSMENT — PAIN SCALES - GENERAL: PAINLEVEL_OUTOF10: NO PAIN (0)

## 2025-05-10 DIAGNOSIS — N95.2 VAGINAL ATROPHY: ICD-10-CM

## 2025-05-12 RX ORDER — ESTRADIOL 0.1 MG/G
CREAM VAGINAL
Qty: 42.5 G | Refills: 3 | Status: SHIPPED | OUTPATIENT
Start: 2025-05-12

## 2025-05-12 NOTE — TELEPHONE ENCOUNTER
Estradiol      Last Written Prescription Date:  7/10/24  Last Fill Quantity: 42.5g,   # refills: 3  Last Office Visit: 4/10/25  Future Office visit:       Routing refill request to provider for review/approval because:

## 2025-05-12 NOTE — TELEPHONE ENCOUNTER
Hormone Replacement Therapy (vaginal) Trorhr83/10/2025 08:32 AM   Protocol Details Medication is active on med list and the sig matches. RN to manually verify dose and sig if red X/fail.

## 2025-06-15 DIAGNOSIS — E78.2 MIXED HYPERLIPIDEMIA: ICD-10-CM

## 2025-06-16 RX ORDER — SIMVASTATIN 40 MG
40 TABLET ORAL AT BEDTIME
Qty: 90 TABLET | Refills: 2 | Status: SHIPPED | OUTPATIENT
Start: 2025-06-16

## 2025-06-16 NOTE — TELEPHONE ENCOUNTER
Simvastatin      Last Written Prescription Date:  5/22/24  Last Fill Quantity: 90,   # refills: 3  Last Office Visit: 4/10/25  Future Office visit:    Next 5 appointments (look out 90 days)      Aug 13, 2025 8:15 AM  (Arrive by 8:00 AM)  Provider Visit with Nathaanel Mena MD  [facility] ([facility] ) [address]  [facility] [place] [place] [address]  [phone]     Aug 29, 2025 8:30 AM  (Arrive by 8:15 AM)  Adult Preventative Visit with Maryjane Lyon MD  [facility] ([facility] ) [address]  [place] [address]  [phone]             Routing refill request to provider for review/approval because:

## 2025-06-16 NOTE — TELEPHONE ENCOUNTER
Routing refill request to provider for review/approval because:    Antihyperlipidemic agents Nodsbx68/15/2025 07:13 AM   Protocol Details LDL on file in the past 12 months        LDL Cholesterol Calculated   Date Value Ref Range Status   06/04/2024 63 <=100 mg/dL Final   06/23/2021 74 <100 mg/dL Final     Comment:     Desirable:       <100 mg/dl

## 2025-07-04 DIAGNOSIS — Z79.4 TYPE 2 DIABETES MELLITUS WITHOUT COMPLICATION, WITH LONG-TERM CURRENT USE OF INSULIN (H): ICD-10-CM

## 2025-07-04 DIAGNOSIS — E11.9 TYPE 2 DIABETES MELLITUS WITHOUT COMPLICATION, WITH LONG-TERM CURRENT USE OF INSULIN (H): ICD-10-CM

## 2025-07-07 RX ORDER — PEN NEEDLE, DIABETIC 31 GX5/16"
NEEDLE, DISPOSABLE MISCELLANEOUS
Qty: 100 EACH | Refills: 3 | Status: SHIPPED | OUTPATIENT
Start: 2025-07-07

## 2025-07-27 ENCOUNTER — HEALTH MAINTENANCE LETTER (OUTPATIENT)
Age: 65
End: 2025-07-27

## 2025-08-03 DIAGNOSIS — I10 BENIGN ESSENTIAL HYPERTENSION: ICD-10-CM

## 2025-08-04 RX ORDER — FUROSEMIDE 40 MG/1
40 TABLET ORAL 2 TIMES DAILY
Qty: 180 TABLET | Refills: 2 | Status: SHIPPED | OUTPATIENT
Start: 2025-08-04

## 2025-08-11 ASSESSMENT — ANXIETY QUESTIONNAIRES
7. FEELING AFRAID AS IF SOMETHING AWFUL MIGHT HAPPEN: NOT AT ALL
3. WORRYING TOO MUCH ABOUT DIFFERENT THINGS: NOT AT ALL
GAD7 TOTAL SCORE: 0
6. BECOMING EASILY ANNOYED OR IRRITABLE: NOT AT ALL
GAD7 TOTAL SCORE: 0
7. FEELING AFRAID AS IF SOMETHING AWFUL MIGHT HAPPEN: NOT AT ALL
GAD7 TOTAL SCORE: 0
1. FEELING NERVOUS, ANXIOUS, OR ON EDGE: NOT AT ALL
4. TROUBLE RELAXING: NOT AT ALL
5. BEING SO RESTLESS THAT IT IS HARD TO SIT STILL: NOT AT ALL
2. NOT BEING ABLE TO STOP OR CONTROL WORRYING: NOT AT ALL

## 2025-08-11 ASSESSMENT — PATIENT HEALTH QUESTIONNAIRE - PHQ9
10. IF YOU CHECKED OFF ANY PROBLEMS, HOW DIFFICULT HAVE THESE PROBLEMS MADE IT FOR YOU TO DO YOUR WORK, TAKE CARE OF THINGS AT HOME, OR GET ALONG WITH OTHER PEOPLE: NOT DIFFICULT AT ALL
SUM OF ALL RESPONSES TO PHQ QUESTIONS 1-9: 2
SUM OF ALL RESPONSES TO PHQ QUESTIONS 1-9: 2

## 2025-08-13 ENCOUNTER — OFFICE VISIT (OUTPATIENT)
Dept: FAMILY MEDICINE | Facility: OTHER | Age: 65
End: 2025-08-13
Attending: FAMILY MEDICINE
Payer: COMMERCIAL

## 2025-08-13 VITALS
SYSTOLIC BLOOD PRESSURE: 122 MMHG | HEART RATE: 51 BPM | WEIGHT: 173 LBS | HEIGHT: 64 IN | DIASTOLIC BLOOD PRESSURE: 70 MMHG | OXYGEN SATURATION: 98 % | BODY MASS INDEX: 29.53 KG/M2 | TEMPERATURE: 97.2 F

## 2025-08-13 DIAGNOSIS — E78.2 MIXED HYPERLIPIDEMIA: ICD-10-CM

## 2025-08-13 DIAGNOSIS — L98.9 SKIN LESION: ICD-10-CM

## 2025-08-13 DIAGNOSIS — Q82.0 MILROY LYMPHEDEMA: ICD-10-CM

## 2025-08-13 DIAGNOSIS — I89.0 LYMPHEDEMA: ICD-10-CM

## 2025-08-13 DIAGNOSIS — E11.9 TYPE 2 DIABETES MELLITUS WITHOUT COMPLICATION, WITH LONG-TERM CURRENT USE OF INSULIN (H): Primary | ICD-10-CM

## 2025-08-13 DIAGNOSIS — I10 BENIGN ESSENTIAL HYPERTENSION: ICD-10-CM

## 2025-08-13 DIAGNOSIS — Z79.4 TYPE 2 DIABETES MELLITUS WITHOUT COMPLICATION, WITH LONG-TERM CURRENT USE OF INSULIN (H): Primary | ICD-10-CM

## 2025-08-13 LAB
ALBUMIN SERPL BCG-MCNC: 4.4 G/DL (ref 3.5–5.2)
ALP SERPL-CCNC: 70 U/L (ref 40–150)
ALT SERPL W P-5'-P-CCNC: 16 U/L (ref 0–50)
ANION GAP SERPL CALCULATED.3IONS-SCNC: 11 MMOL/L (ref 7–15)
AST SERPL W P-5'-P-CCNC: 27 U/L (ref 0–45)
BILIRUB SERPL-MCNC: 0.4 MG/DL
BUN SERPL-MCNC: 11.6 MG/DL (ref 8–23)
CALCIUM SERPL-MCNC: 9.7 MG/DL (ref 8.8–10.4)
CHLORIDE SERPL-SCNC: 103 MMOL/L (ref 98–107)
CHOLEST SERPL-MCNC: 128 MG/DL
CREAT SERPL-MCNC: 0.74 MG/DL (ref 0.51–0.95)
EGFRCR SERPLBLD CKD-EPI 2021: 90 ML/MIN/1.73M2
EST. AVERAGE GLUCOSE BLD GHB EST-MCNC: 120 MG/DL
FASTING STATUS PATIENT QL REPORTED: YES
FASTING STATUS PATIENT QL REPORTED: YES
GLUCOSE SERPL-MCNC: 86 MG/DL (ref 70–99)
HBA1C MFR BLD: 5.8 %
HCO3 SERPL-SCNC: 28 MMOL/L (ref 22–29)
HDLC SERPL-MCNC: 40 MG/DL
LDLC SERPL CALC-MCNC: 55 MG/DL
NONHDLC SERPL-MCNC: 88 MG/DL
POTASSIUM SERPL-SCNC: 4.8 MMOL/L (ref 3.4–5.3)
PROT SERPL-MCNC: 7.1 G/DL (ref 6.4–8.3)
SODIUM SERPL-SCNC: 142 MMOL/L (ref 135–145)
T4 FREE SERPL-MCNC: 0.96 NG/DL (ref 0.9–1.7)
TRIGL SERPL-MCNC: 164 MG/DL
TSH SERPL DL<=0.005 MIU/L-ACNC: 4.79 UIU/ML (ref 0.3–4.2)

## 2025-08-13 ASSESSMENT — PAIN SCALES - GENERAL: PAINLEVEL_OUTOF10: NO PAIN (0)

## 2025-08-19 DIAGNOSIS — F34.1 DYSTHYMIC DISORDER: ICD-10-CM

## 2025-08-19 RX ORDER — VENLAFAXINE HYDROCHLORIDE 150 MG/1
150 CAPSULE, EXTENDED RELEASE ORAL DAILY
Qty: 90 CAPSULE | Refills: 1 | Status: SHIPPED | OUTPATIENT
Start: 2025-08-19

## 2025-08-20 DIAGNOSIS — R21 RASH AND NONSPECIFIC SKIN ERUPTION: ICD-10-CM

## 2025-08-20 RX ORDER — TRIAMCINOLONE ACETONIDE 5 MG/G
CREAM TOPICAL 2 TIMES DAILY
Qty: 45 G | Refills: 1 | Status: SHIPPED | OUTPATIENT
Start: 2025-08-20